# Patient Record
Sex: FEMALE | Race: WHITE | Employment: OTHER | URBAN - METROPOLITAN AREA
[De-identification: names, ages, dates, MRNs, and addresses within clinical notes are randomized per-mention and may not be internally consistent; named-entity substitution may affect disease eponyms.]

---

## 2017-06-21 ENCOUNTER — OFFICE VISIT (OUTPATIENT)
Dept: ORTHOPEDIC SURGERY | Age: 62
End: 2017-06-21
Payer: COMMERCIAL

## 2017-06-21 DIAGNOSIS — M25.561 CHRONIC PAIN OF RIGHT KNEE: Primary | ICD-10-CM

## 2017-06-21 DIAGNOSIS — G89.29 CHRONIC PAIN OF RIGHT KNEE: Primary | ICD-10-CM

## 2017-06-21 PROCEDURE — 99203 OFFICE O/P NEW LOW 30 MIN: CPT | Performed by: ORTHOPAEDIC SURGERY

## 2017-06-30 ENCOUNTER — OFFICE VISIT (OUTPATIENT)
Dept: BARIATRICS/WEIGHT MGMT | Age: 62
End: 2017-06-30
Payer: COMMERCIAL

## 2017-06-30 VITALS
DIASTOLIC BLOOD PRESSURE: 75 MMHG | BODY MASS INDEX: 46.44 KG/M2 | HEIGHT: 61 IN | SYSTOLIC BLOOD PRESSURE: 130 MMHG | WEIGHT: 246 LBS | HEART RATE: 75 BPM

## 2017-06-30 DIAGNOSIS — N39.3 SUI (STRESS URINARY INCONTINENCE, FEMALE): ICD-10-CM

## 2017-06-30 DIAGNOSIS — G47.33 OBSTRUCTIVE SLEEP APNEA SYNDROME: Chronic | ICD-10-CM

## 2017-06-30 DIAGNOSIS — I10 ESSENTIAL HYPERTENSION: Primary | Chronic | ICD-10-CM

## 2017-06-30 PROCEDURE — 99213 OFFICE O/P EST LOW 20 MIN: CPT | Performed by: SURGERY

## 2017-06-30 RX ORDER — MULTIVIT WITH MINERALS/LUTEIN
1000 TABLET ORAL DAILY
COMMUNITY

## 2017-06-30 RX ORDER — ACETAMINOPHEN 160 MG
TABLET,DISINTEGRATING ORAL DAILY
COMMUNITY

## 2017-06-30 RX ORDER — BISOPROLOL FUMARATE AND HYDROCHLOROTHIAZIDE 5; 6.25 MG/1; MG/1
1 TABLET ORAL DAILY
COMMUNITY
Start: 2016-11-22

## 2017-06-30 RX ORDER — MAGNESIUM 200 MG
250 TABLET ORAL DAILY
COMMUNITY
End: 2018-05-29 | Stop reason: DRUGHIGH

## 2017-06-30 RX ORDER — LOSARTAN POTASSIUM 50 MG/1
50 TABLET ORAL
COMMUNITY
Start: 2016-11-12 | End: 2017-06-30

## 2017-07-05 ENCOUNTER — OFFICE VISIT (OUTPATIENT)
Dept: OBGYN CLINIC | Age: 62
End: 2017-07-05
Payer: COMMERCIAL

## 2017-07-05 VITALS
DIASTOLIC BLOOD PRESSURE: 84 MMHG | HEART RATE: 78 BPM | WEIGHT: 246 LBS | SYSTOLIC BLOOD PRESSURE: 126 MMHG | BODY MASS INDEX: 46.44 KG/M2 | HEIGHT: 61 IN

## 2017-07-05 DIAGNOSIS — Z01.419 WELL WOMAN EXAM WITH ROUTINE GYNECOLOGICAL EXAM: Primary | ICD-10-CM

## 2017-07-05 DIAGNOSIS — Z13.820 SCREENING FOR OSTEOPOROSIS: ICD-10-CM

## 2017-07-05 DIAGNOSIS — Z12.39 ENCOUNTER FOR BREAST CANCER SCREENING OTHER THAN MAMMOGRAM: ICD-10-CM

## 2017-07-05 PROCEDURE — 99396 PREV VISIT EST AGE 40-64: CPT | Performed by: NURSE PRACTITIONER

## 2017-07-05 RX ORDER — METRONIDAZOLE 500 MG/1
500 TABLET ORAL 2 TIMES DAILY
Qty: 14 TABLET | Refills: 0 | Status: SHIPPED | OUTPATIENT
Start: 2017-07-05 | End: 2017-07-12

## 2017-07-05 ASSESSMENT — PATIENT HEALTH QUESTIONNAIRE - PHQ9
2. FEELING DOWN, DEPRESSED OR HOPELESS: 0
SUM OF ALL RESPONSES TO PHQ9 QUESTIONS 1 & 2: 0
SUM OF ALL RESPONSES TO PHQ QUESTIONS 1-9: 0
1. LITTLE INTEREST OR PLEASURE IN DOING THINGS: 0

## 2017-07-10 ENCOUNTER — TELEPHONE (OUTPATIENT)
Dept: BARIATRICS/WEIGHT MGMT | Age: 62
End: 2017-07-10

## 2017-07-14 ENCOUNTER — OFFICE VISIT (OUTPATIENT)
Dept: BARIATRICS/WEIGHT MGMT | Age: 62
End: 2017-07-14
Payer: COMMERCIAL

## 2017-07-14 VITALS
HEIGHT: 61 IN | RESPIRATION RATE: 20 BRPM | WEIGHT: 247 LBS | HEART RATE: 76 BPM | SYSTOLIC BLOOD PRESSURE: 102 MMHG | BODY MASS INDEX: 46.63 KG/M2 | DIASTOLIC BLOOD PRESSURE: 66 MMHG

## 2017-07-14 DIAGNOSIS — F32.A DEPRESSION, UNSPECIFIED DEPRESSION TYPE: ICD-10-CM

## 2017-07-14 DIAGNOSIS — G47.33 OBSTRUCTIVE SLEEP APNEA SYNDROME: Chronic | ICD-10-CM

## 2017-07-14 DIAGNOSIS — G89.29 CHRONIC PAIN OF RIGHT KNEE: ICD-10-CM

## 2017-07-14 DIAGNOSIS — E66.01 OBESITY, MORBID, BMI 40.0-49.9 (HCC): ICD-10-CM

## 2017-07-14 DIAGNOSIS — J45.909 UNCOMPLICATED ASTHMA, UNSPECIFIED ASTHMA SEVERITY: Chronic | ICD-10-CM

## 2017-07-14 DIAGNOSIS — Z90.3 S/P PARTIAL GASTRECTOMY: Chronic | ICD-10-CM

## 2017-07-14 DIAGNOSIS — I10 ESSENTIAL HYPERTENSION: Primary | Chronic | ICD-10-CM

## 2017-07-14 DIAGNOSIS — K21.9 GASTROESOPHAGEAL REFLUX DISEASE, ESOPHAGITIS PRESENCE NOT SPECIFIED: Chronic | ICD-10-CM

## 2017-07-14 DIAGNOSIS — E03.9 HYPOTHYROIDISM, UNSPECIFIED TYPE: Chronic | ICD-10-CM

## 2017-07-14 DIAGNOSIS — M85.80 OSTEOPENIA: Chronic | ICD-10-CM

## 2017-07-14 DIAGNOSIS — M25.561 CHRONIC PAIN OF RIGHT KNEE: ICD-10-CM

## 2017-07-14 DIAGNOSIS — N39.3 SUI (STRESS URINARY INCONTINENCE, FEMALE): ICD-10-CM

## 2017-07-14 PROCEDURE — 99213 OFFICE O/P EST LOW 20 MIN: CPT | Performed by: NURSE PRACTITIONER

## 2017-07-17 ENCOUNTER — HOSPITAL ENCOUNTER (OUTPATIENT)
Dept: WOMENS IMAGING | Age: 62
Discharge: HOME | End: 2017-07-17
Payer: COMMERCIAL

## 2017-07-17 DIAGNOSIS — Z01.419 WELL WOMAN EXAM WITH ROUTINE GYNECOLOGICAL EXAM: ICD-10-CM

## 2017-07-17 DIAGNOSIS — Z12.39 ENCOUNTER FOR BREAST CANCER SCREENING OTHER THAN MAMMOGRAM: ICD-10-CM

## 2017-07-17 PROCEDURE — 77063 BREAST TOMOSYNTHESIS BI: CPT

## 2017-07-18 ENCOUNTER — HOSPITAL ENCOUNTER (OUTPATIENT)
Age: 62
Setting detail: SPECIMEN
Discharge: HOME | End: 2017-07-18
Payer: COMMERCIAL

## 2017-07-18 DIAGNOSIS — J45.909 UNCOMPLICATED ASTHMA, UNSPECIFIED ASTHMA SEVERITY: Chronic | ICD-10-CM

## 2017-07-18 DIAGNOSIS — M85.80 OSTEOPENIA: Chronic | ICD-10-CM

## 2017-07-18 DIAGNOSIS — G89.29 CHRONIC PAIN OF RIGHT KNEE: ICD-10-CM

## 2017-07-18 DIAGNOSIS — N39.3 SUI (STRESS URINARY INCONTINENCE, FEMALE): ICD-10-CM

## 2017-07-18 DIAGNOSIS — E03.9 HYPOTHYROIDISM, UNSPECIFIED TYPE: Chronic | ICD-10-CM

## 2017-07-18 DIAGNOSIS — M25.561 CHRONIC PAIN OF RIGHT KNEE: ICD-10-CM

## 2017-07-18 DIAGNOSIS — I10 ESSENTIAL HYPERTENSION: Chronic | ICD-10-CM

## 2017-07-18 DIAGNOSIS — F32.A DEPRESSION, UNSPECIFIED DEPRESSION TYPE: ICD-10-CM

## 2017-07-18 DIAGNOSIS — K21.9 GASTROESOPHAGEAL REFLUX DISEASE, ESOPHAGITIS PRESENCE NOT SPECIFIED: Chronic | ICD-10-CM

## 2017-07-18 DIAGNOSIS — G47.33 OBSTRUCTIVE SLEEP APNEA SYNDROME: Chronic | ICD-10-CM

## 2017-07-18 DIAGNOSIS — Z90.3 S/P PARTIAL GASTRECTOMY: Chronic | ICD-10-CM

## 2017-07-18 LAB
ABSOLUTE EOS #: 0.2 K/UL (ref 0–0.4)
ABSOLUTE LYMPH #: 1.9 K/UL (ref 1–4.8)
ABSOLUTE MONO #: 0.5 K/UL (ref 0.1–1.2)
ALBUMIN SERPL-MCNC: 4.1 G/DL (ref 3.5–5.2)
ALBUMIN/GLOBULIN RATIO: 1.5 (ref 1–2.5)
ALP BLD-CCNC: 65 U/L (ref 35–104)
ALT SERPL-CCNC: 42 U/L (ref 5–33)
ANION GAP SERPL CALCULATED.3IONS-SCNC: 14 MMOL/L (ref 9–17)
AST SERPL-CCNC: 30 U/L
BASOPHILS # BLD: 0 %
BASOPHILS ABSOLUTE: 0 K/UL (ref 0–0.2)
BILIRUB SERPL-MCNC: 0.26 MG/DL (ref 0.3–1.2)
BUN BLDV-MCNC: 28 MG/DL (ref 8–23)
BUN/CREAT BLD: ABNORMAL (ref 9–20)
CALCIUM SERPL-MCNC: 9.9 MG/DL (ref 8.6–10.4)
CHLORIDE BLD-SCNC: 101 MMOL/L (ref 98–107)
CHOLESTEROL/HDL RATIO: 4.3
CHOLESTEROL: 192 MG/DL
CO2: 26 MMOL/L (ref 20–31)
CREAT SERPL-MCNC: 0.94 MG/DL (ref 0.5–0.9)
DIFFERENTIAL TYPE: NORMAL
EOSINOPHILS RELATIVE PERCENT: 4 %
ESTIMATED AVERAGE GLUCOSE: 128 MG/DL
FERRITIN: 67 UG/L (ref 13–150)
FOLATE: >20 NG/ML
GFR AFRICAN AMERICAN: >60 ML/MIN
GFR NON-AFRICAN AMERICAN: >60 ML/MIN
GFR SERPL CREATININE-BSD FRML MDRD: ABNORMAL ML/MIN/{1.73_M2}
GFR SERPL CREATININE-BSD FRML MDRD: ABNORMAL ML/MIN/{1.73_M2}
GLUCOSE BLD-MCNC: 109 MG/DL (ref 70–99)
HBA1C MFR BLD: 6.1 % (ref 4–6)
HCT VFR BLD CALC: 42.1 % (ref 36–46)
HDLC SERPL-MCNC: 45 MG/DL
HEMOGLOBIN: 13.9 G/DL (ref 12–16)
IRON SATURATION: 32 % (ref 20–55)
IRON: 106 UG/DL (ref 37–145)
LDL CHOLESTEROL: 104 MG/DL (ref 0–130)
LYMPHOCYTES # BLD: 28 %
MAGNESIUM: 2.4 MG/DL (ref 1.6–2.6)
MCH RBC QN AUTO: 30.8 PG (ref 26–34)
MCHC RBC AUTO-ENTMCNC: 33 G/DL (ref 31–37)
MCV RBC AUTO: 93.3 FL (ref 80–100)
MONOCYTES # BLD: 8 %
PDW BLD-RTO: 14.9 % (ref 12.5–15.4)
PLATELET # BLD: 243 K/UL (ref 140–450)
PLATELET ESTIMATE: NORMAL
PMV BLD AUTO: 8.2 FL (ref 6–12)
POTASSIUM SERPL-SCNC: 4.6 MMOL/L (ref 3.7–5.3)
PTH INTACT: 25.22 PG/ML (ref 15–65)
RBC # BLD: 4.51 M/UL (ref 4–5.2)
RBC # BLD: NORMAL 10*6/UL
SEG NEUTROPHILS: 60 %
SEGMENTED NEUTROPHILS ABSOLUTE COUNT: 4.1 K/UL (ref 1.8–7.7)
SODIUM BLD-SCNC: 141 MMOL/L (ref 135–144)
THYROXINE, FREE: 1.31 NG/DL (ref 0.93–1.7)
TOTAL IRON BINDING CAPACITY: 330 UG/DL (ref 250–450)
TOTAL PROTEIN: 6.9 G/DL (ref 6.4–8.3)
TRIGL SERPL-MCNC: 217 MG/DL
TSH SERPL DL<=0.05 MIU/L-ACNC: 0.62 MIU/L (ref 0.3–5)
UNSATURATED IRON BINDING CAPACITY: 224 UG/DL (ref 112–347)
VITAMIN B-12: 978 PG/ML (ref 211–946)
VITAMIN D 25-HYDROXY: 75.9 NG/ML (ref 30–100)
VLDLC SERPL CALC-MCNC: ABNORMAL MG/DL (ref 1–30)
WBC # BLD: 6.7 K/UL (ref 3.5–11)
WBC # BLD: NORMAL 10*3/UL

## 2017-07-19 PROBLEM — R73.03 PREDIABETES: Status: ACTIVE | Noted: 2017-07-19

## 2017-07-20 ENCOUNTER — NURSE ONLY (OUTPATIENT)
Dept: BARIATRICS/WEIGHT MGMT | Age: 62
End: 2017-07-20

## 2017-07-20 VITALS — BODY MASS INDEX: 45.91 KG/M2 | WEIGHT: 243 LBS

## 2017-07-20 LAB
RETINYL PALMITATE: 0.04 MG/L (ref 0–0.1)
VITAMIN A LEVEL: 0.62 MG/L (ref 0.3–1.2)
VITAMIN A, INTERP: NORMAL
VITAMIN B1 WHOLE BLOOD: 67 NMOL/L (ref 70–180)
ZINC: 98 UG/DL (ref 60–120)

## 2017-08-16 ENCOUNTER — HOSPITAL ENCOUNTER (OUTPATIENT)
Dept: PREADMISSION TESTING | Age: 62
Discharge: HOME | End: 2017-08-16
Payer: COMMERCIAL

## 2017-08-16 VITALS
DIASTOLIC BLOOD PRESSURE: 83 MMHG | HEIGHT: 61 IN | TEMPERATURE: 97.9 F | RESPIRATION RATE: 20 BRPM | BODY MASS INDEX: 45.31 KG/M2 | OXYGEN SATURATION: 96 % | HEART RATE: 78 BPM | WEIGHT: 240 LBS | SYSTOLIC BLOOD PRESSURE: 116 MMHG

## 2017-08-16 LAB
ABSOLUTE EOS #: 0.2 K/UL (ref 0–0.4)
ABSOLUTE LYMPH #: 2.4 K/UL (ref 1–4.8)
ABSOLUTE MONO #: 0.5 K/UL (ref 0.1–1.3)
ANION GAP SERPL CALCULATED.3IONS-SCNC: 13 MMOL/L (ref 9–17)
BASOPHILS # BLD: 0 %
BASOPHILS ABSOLUTE: 0 K/UL (ref 0–0.2)
BUN BLDV-MCNC: 18 MG/DL (ref 8–23)
BUN/CREAT BLD: ABNORMAL (ref 9–20)
CALCIUM SERPL-MCNC: 9.5 MG/DL (ref 8.6–10.4)
CHLORIDE BLD-SCNC: 102 MMOL/L (ref 98–107)
CO2: 25 MMOL/L (ref 20–31)
CREAT SERPL-MCNC: 0.68 MG/DL (ref 0.5–0.9)
DIFFERENTIAL TYPE: NORMAL
EOSINOPHILS RELATIVE PERCENT: 3 %
GFR AFRICAN AMERICAN: >60 ML/MIN
GFR NON-AFRICAN AMERICAN: >60 ML/MIN
GFR SERPL CREATININE-BSD FRML MDRD: ABNORMAL ML/MIN/{1.73_M2}
GFR SERPL CREATININE-BSD FRML MDRD: ABNORMAL ML/MIN/{1.73_M2}
GLUCOSE BLD-MCNC: 101 MG/DL (ref 70–99)
HCT VFR BLD CALC: 45 % (ref 36–46)
HEMOGLOBIN: 15 G/DL (ref 12–16)
LYMPHOCYTES # BLD: 29 %
MCH RBC QN AUTO: 31.6 PG (ref 26–34)
MCHC RBC AUTO-ENTMCNC: 33.4 G/DL (ref 31–37)
MCV RBC AUTO: 94.7 FL (ref 80–100)
MONOCYTES # BLD: 6 %
PDW BLD-RTO: 14.2 % (ref 11.5–14.9)
PLATELET # BLD: 251 K/UL (ref 150–450)
PLATELET ESTIMATE: NORMAL
PMV BLD AUTO: 8.1 FL (ref 6–12)
POTASSIUM SERPL-SCNC: 4.4 MMOL/L (ref 3.7–5.3)
RBC # BLD: 4.75 M/UL (ref 4–5.2)
RBC # BLD: NORMAL 10*6/UL
SEG NEUTROPHILS: 62 %
SEGMENTED NEUTROPHILS ABSOLUTE COUNT: 5 K/UL (ref 1.3–9.1)
SODIUM BLD-SCNC: 140 MMOL/L (ref 135–144)
WBC # BLD: 8.1 K/UL (ref 3.5–11)
WBC # BLD: NORMAL 10*3/UL

## 2017-08-16 PROCEDURE — 85025 COMPLETE CBC W/AUTO DIFF WBC: CPT

## 2017-08-16 PROCEDURE — 36415 COLL VENOUS BLD VENIPUNCTURE: CPT

## 2017-08-16 PROCEDURE — 93005 ELECTROCARDIOGRAM TRACING: CPT

## 2017-08-16 PROCEDURE — 80048 BASIC METABOLIC PNL TOTAL CA: CPT

## 2017-08-16 RX ORDER — B-COMPLEX WITH VITAMIN C
250 TABLET ORAL DAILY
COMMUNITY

## 2017-08-17 RX ORDER — LIDOCAINE HYDROCHLORIDE 10 MG/ML
1 INJECTION, SOLUTION EPIDURAL; INFILTRATION; INTRACAUDAL; PERINEURAL
Status: CANCELLED | OUTPATIENT
Start: 2017-08-17 | End: 2017-08-17

## 2017-08-17 RX ORDER — SODIUM CHLORIDE, SODIUM LACTATE, POTASSIUM CHLORIDE, CALCIUM CHLORIDE 600; 310; 30; 20 MG/100ML; MG/100ML; MG/100ML; MG/100ML
INJECTION, SOLUTION INTRAVENOUS CONTINUOUS
Status: CANCELLED | OUTPATIENT
Start: 2017-08-17

## 2017-08-17 RX ORDER — SODIUM CHLORIDE 0.9 % (FLUSH) 0.9 %
10 SYRINGE (ML) INJECTION EVERY 12 HOURS SCHEDULED
Status: CANCELLED | OUTPATIENT
Start: 2017-08-17

## 2017-08-17 RX ORDER — SODIUM CHLORIDE 0.9 % (FLUSH) 0.9 %
10 SYRINGE (ML) INJECTION PRN
Status: CANCELLED | OUTPATIENT
Start: 2017-08-17

## 2017-08-19 ENCOUNTER — HOSPITAL ENCOUNTER (EMERGENCY)
Age: 62
Discharge: HOME | End: 2017-08-19
Attending: EMERGENCY MEDICINE
Payer: COMMERCIAL

## 2017-08-19 ENCOUNTER — APPOINTMENT (OUTPATIENT)
Dept: GENERAL RADIOLOGY | Age: 62
End: 2017-08-19
Payer: COMMERCIAL

## 2017-08-19 VITALS
TEMPERATURE: 98.2 F | BODY MASS INDEX: 45.31 KG/M2 | HEART RATE: 77 BPM | OXYGEN SATURATION: 95 % | WEIGHT: 240 LBS | RESPIRATION RATE: 18 BRPM | SYSTOLIC BLOOD PRESSURE: 130 MMHG | DIASTOLIC BLOOD PRESSURE: 74 MMHG | HEIGHT: 61 IN

## 2017-08-19 DIAGNOSIS — S42.402A ELBOW FRACTURE, LEFT, CLOSED, INITIAL ENCOUNTER: Primary | ICD-10-CM

## 2017-08-19 PROCEDURE — 29105 APPLICATION LONG ARM SPLINT: CPT

## 2017-08-19 PROCEDURE — 96374 THER/PROPH/DIAG INJ IV PUSH: CPT

## 2017-08-19 PROCEDURE — 6360000002 HC RX W HCPCS: Performed by: EMERGENCY MEDICINE

## 2017-08-19 PROCEDURE — 73080 X-RAY EXAM OF ELBOW: CPT

## 2017-08-19 PROCEDURE — 99283 EMERGENCY DEPT VISIT LOW MDM: CPT

## 2017-08-19 PROCEDURE — 96375 TX/PRO/DX INJ NEW DRUG ADDON: CPT

## 2017-08-19 RX ORDER — KETOROLAC TROMETHAMINE 10 MG/1
10 TABLET, FILM COATED ORAL EVERY 6 HOURS PRN
Qty: 20 TABLET | Refills: 0 | Status: SHIPPED | OUTPATIENT
Start: 2017-08-19 | End: 2017-08-30 | Stop reason: ALTCHOICE

## 2017-08-19 RX ORDER — KETOROLAC TROMETHAMINE 30 MG/ML
30 INJECTION, SOLUTION INTRAMUSCULAR; INTRAVENOUS ONCE
Status: COMPLETED | OUTPATIENT
Start: 2017-08-19 | End: 2017-08-19

## 2017-08-19 RX ORDER — MORPHINE SULFATE 4 MG/ML
4 INJECTION, SOLUTION INTRAMUSCULAR; INTRAVENOUS ONCE
Status: COMPLETED | OUTPATIENT
Start: 2017-08-19 | End: 2017-08-19

## 2017-08-19 RX ADMIN — KETOROLAC TROMETHAMINE 30 MG: 30 INJECTION, SOLUTION INTRAMUSCULAR; INTRAVENOUS at 19:52

## 2017-08-19 RX ADMIN — MORPHINE SULFATE 4 MG: 4 INJECTION, SOLUTION INTRAMUSCULAR; INTRAVENOUS at 18:22

## 2017-08-19 ASSESSMENT — ENCOUNTER SYMPTOMS
CONSTIPATION: 0
VOMITING: 0
COUGH: 0
DIARRHEA: 0
BACK PAIN: 0
SHORTNESS OF BREATH: 0
NAUSEA: 0
ABDOMINAL PAIN: 0

## 2017-08-19 ASSESSMENT — PAIN DESCRIPTION - ORIENTATION: ORIENTATION: LEFT

## 2017-08-19 ASSESSMENT — PAIN SCALES - GENERAL
PAINLEVEL_OUTOF10: 7

## 2017-08-19 ASSESSMENT — PAIN DESCRIPTION - FREQUENCY: FREQUENCY: CONTINUOUS

## 2017-08-19 ASSESSMENT — PAIN DESCRIPTION - DESCRIPTORS: DESCRIPTORS: ACHING

## 2017-08-19 ASSESSMENT — PAIN DESCRIPTION - LOCATION: LOCATION: ELBOW

## 2017-08-23 LAB
EKG ATRIAL RATE: 69 BPM
EKG P AXIS: 52 DEGREES
EKG P-R INTERVAL: 152 MS
EKG Q-T INTERVAL: 410 MS
EKG QRS DURATION: 84 MS
EKG QTC CALCULATION (BAZETT): 439 MS
EKG R AXIS: -3 DEGREES
EKG T AXIS: 38 DEGREES
EKG VENTRICULAR RATE: 69 BPM

## 2017-08-28 ENCOUNTER — OFFICE VISIT (OUTPATIENT)
Dept: ORTHOPEDIC SURGERY | Age: 62
End: 2017-08-28
Payer: COMMERCIAL

## 2017-08-28 DIAGNOSIS — S42.402D ELBOW FRACTURE, LEFT, WITH ROUTINE HEALING, SUBSEQUENT ENCOUNTER: Primary | ICD-10-CM

## 2017-08-28 PROCEDURE — 99202 OFFICE O/P NEW SF 15 MIN: CPT | Performed by: ORTHOPAEDIC SURGERY

## 2017-08-28 RX ORDER — HYDROCODONE BITARTRATE AND ACETAMINOPHEN 5; 325 MG/1; MG/1
1-2 TABLET ORAL EVERY 6 HOURS PRN
Qty: 40 TABLET | Refills: 0 | Status: SHIPPED | OUTPATIENT
Start: 2017-08-28 | End: 2017-11-15 | Stop reason: ALTCHOICE

## 2017-08-28 RX ORDER — TRAMADOL HYDROCHLORIDE 50 MG/1
50 TABLET ORAL EVERY 6 HOURS PRN
COMMUNITY
End: 2018-06-05 | Stop reason: CLARIF

## 2017-08-30 ENCOUNTER — OFFICE VISIT (OUTPATIENT)
Dept: BARIATRICS/WEIGHT MGMT | Age: 62
End: 2017-08-30
Payer: COMMERCIAL

## 2017-08-30 VITALS
DIASTOLIC BLOOD PRESSURE: 72 MMHG | BODY MASS INDEX: 45.12 KG/M2 | HEART RATE: 74 BPM | SYSTOLIC BLOOD PRESSURE: 126 MMHG | WEIGHT: 239 LBS | HEIGHT: 61 IN

## 2017-08-30 DIAGNOSIS — K21.9 GASTROESOPHAGEAL REFLUX DISEASE, ESOPHAGITIS PRESENCE NOT SPECIFIED: Chronic | ICD-10-CM

## 2017-08-30 DIAGNOSIS — M85.80 OSTEOPENIA, UNSPECIFIED LOCATION: Chronic | ICD-10-CM

## 2017-08-30 DIAGNOSIS — E66.01 OBESITY, MORBID, BMI 40.0-49.9 (HCC): ICD-10-CM

## 2017-08-30 DIAGNOSIS — Z90.3 S/P PARTIAL GASTRECTOMY: Chronic | ICD-10-CM

## 2017-08-30 DIAGNOSIS — I10 ESSENTIAL HYPERTENSION: Primary | Chronic | ICD-10-CM

## 2017-08-30 DIAGNOSIS — N39.3 SUI (STRESS URINARY INCONTINENCE, FEMALE): ICD-10-CM

## 2017-08-30 DIAGNOSIS — F32.A DEPRESSION, UNSPECIFIED DEPRESSION TYPE: ICD-10-CM

## 2017-08-30 DIAGNOSIS — R73.03 PREDIABETES: ICD-10-CM

## 2017-08-30 DIAGNOSIS — E03.9 HYPOTHYROIDISM, UNSPECIFIED TYPE: Chronic | ICD-10-CM

## 2017-08-30 DIAGNOSIS — J45.909 UNCOMPLICATED ASTHMA, UNSPECIFIED ASTHMA SEVERITY: Chronic | ICD-10-CM

## 2017-08-30 DIAGNOSIS — G89.29 CHRONIC PAIN OF RIGHT KNEE: ICD-10-CM

## 2017-08-30 DIAGNOSIS — G47.33 OBSTRUCTIVE SLEEP APNEA SYNDROME: Chronic | ICD-10-CM

## 2017-08-30 DIAGNOSIS — M25.561 CHRONIC PAIN OF RIGHT KNEE: ICD-10-CM

## 2017-08-30 PROCEDURE — 99213 OFFICE O/P EST LOW 20 MIN: CPT | Performed by: NURSE PRACTITIONER

## 2017-08-31 ENCOUNTER — HOSPITAL ENCOUNTER (OUTPATIENT)
Dept: CT IMAGING | Age: 62
Discharge: HOME | End: 2017-08-31
Payer: COMMERCIAL

## 2017-08-31 ENCOUNTER — TELEPHONE (OUTPATIENT)
Dept: ORTHOPEDIC SURGERY | Age: 62
End: 2017-08-31

## 2017-08-31 DIAGNOSIS — S42.402D ELBOW FRACTURE, LEFT, WITH ROUTINE HEALING, SUBSEQUENT ENCOUNTER: ICD-10-CM

## 2017-08-31 PROCEDURE — 73200 CT UPPER EXTREMITY W/O DYE: CPT

## 2017-09-11 ENCOUNTER — OFFICE VISIT (OUTPATIENT)
Dept: ORTHOPEDIC SURGERY | Age: 62
End: 2017-09-11

## 2017-09-11 DIAGNOSIS — S42.402D ELBOW FRACTURE, LEFT, WITH ROUTINE HEALING, SUBSEQUENT ENCOUNTER: Primary | ICD-10-CM

## 2017-09-11 PROCEDURE — 99024 POSTOP FOLLOW-UP VISIT: CPT | Performed by: ORTHOPAEDIC SURGERY

## 2017-09-26 ENCOUNTER — OFFICE VISIT (OUTPATIENT)
Dept: BARIATRICS/WEIGHT MGMT | Age: 62
End: 2017-09-26
Payer: COMMERCIAL

## 2017-09-26 VITALS
SYSTOLIC BLOOD PRESSURE: 118 MMHG | HEIGHT: 61 IN | RESPIRATION RATE: 20 BRPM | HEART RATE: 74 BPM | WEIGHT: 238 LBS | BODY MASS INDEX: 44.93 KG/M2 | DIASTOLIC BLOOD PRESSURE: 74 MMHG

## 2017-09-26 DIAGNOSIS — J45.909 UNCOMPLICATED ASTHMA, UNSPECIFIED ASTHMA SEVERITY: Chronic | ICD-10-CM

## 2017-09-26 DIAGNOSIS — E03.9 HYPOTHYROIDISM, UNSPECIFIED TYPE: Chronic | ICD-10-CM

## 2017-09-26 DIAGNOSIS — M85.80 OSTEOPENIA, UNSPECIFIED LOCATION: Chronic | ICD-10-CM

## 2017-09-26 DIAGNOSIS — Z90.3 S/P PARTIAL GASTRECTOMY: Chronic | ICD-10-CM

## 2017-09-26 DIAGNOSIS — I10 ESSENTIAL HYPERTENSION: Primary | Chronic | ICD-10-CM

## 2017-09-26 DIAGNOSIS — R73.03 PREDIABETES: ICD-10-CM

## 2017-09-26 DIAGNOSIS — F32.A DEPRESSION, UNSPECIFIED DEPRESSION TYPE: ICD-10-CM

## 2017-09-26 DIAGNOSIS — G89.29 CHRONIC PAIN OF RIGHT KNEE: ICD-10-CM

## 2017-09-26 DIAGNOSIS — E66.01 OBESITY, MORBID, BMI 40.0-49.9 (HCC): ICD-10-CM

## 2017-09-26 DIAGNOSIS — M25.561 CHRONIC PAIN OF RIGHT KNEE: ICD-10-CM

## 2017-09-26 DIAGNOSIS — G47.33 OBSTRUCTIVE SLEEP APNEA SYNDROME: Chronic | ICD-10-CM

## 2017-09-26 DIAGNOSIS — K21.9 GASTROESOPHAGEAL REFLUX DISEASE, ESOPHAGITIS PRESENCE NOT SPECIFIED: Chronic | ICD-10-CM

## 2017-09-26 PROCEDURE — 99213 OFFICE O/P EST LOW 20 MIN: CPT | Performed by: NURSE PRACTITIONER

## 2017-10-04 ENCOUNTER — OFFICE VISIT (OUTPATIENT)
Dept: ORTHOPEDIC SURGERY | Age: 62
End: 2017-10-04

## 2017-10-04 DIAGNOSIS — S42.402D ELBOW FRACTURE, LEFT, WITH ROUTINE HEALING, SUBSEQUENT ENCOUNTER: Primary | ICD-10-CM

## 2017-10-04 PROCEDURE — 99024 POSTOP FOLLOW-UP VISIT: CPT | Performed by: ORTHOPAEDIC SURGERY

## 2017-10-04 RX ORDER — HYDROCODONE BITARTRATE AND ACETAMINOPHEN 5; 325 MG/1; MG/1
1 TABLET ORAL EVERY 6 HOURS PRN
Qty: 20 TABLET | Refills: 0 | Status: SHIPPED | OUTPATIENT
Start: 2017-10-04 | End: 2017-10-11

## 2017-10-04 NOTE — PROGRESS NOTES
Jareth Pizarro M.D.            [address] [place] [address]., [address] [facility], [address] [facility]             Dept Phone: [phone]             Dept Fax:  [phone] returns today. She is status post nondisplaced left proximal fracture as well as nondisplaced do minimally displaced radial head fracture. Unusual fracture pattern. She had a CT scan to verify this. Patient states that that she has low bit of aching is now than but she is making great progress in therapy and pretty much getting back to resume a normal daily activity. Examination patient's left shoulder notes that she lacks about the last 10° of full extension. She can flex almost to touch her shoulder. Supination pronation are essentially full. She has good strength. Overall doing very well. No neurovascular change    X-rays taken today and reviewed by me show AP lateral views of the left elbow which shows a nondisplaced left proximal ulnar fracture and of in good position. Her radial head fracture had very minimal displacement very acceptable doing well. Impression line status post left proximal radius and ulnar fracture    Plan line patient overall doing well. I told her she's not likely to get full extension of her elbow but this is not necessary. She's enough to do daily activities. She has a come more therapy sessions. She requests another Norco prescription for a few nights ×20. We'll see her back here when necessary        Review of Systems   Constitutional: Negative. HENT: Negative. Respiratory: Negative. Cardiovascular: Negative. Musculoskeletal: Negative. Neurological: Negative.          Past Medical History:   Diagnosis Date    Asthma     Depression     GERD (gastroesophageal reflux disease)     Glaucoma associated with anomalies of iris     Hyperlipidemia     hx of, currently no medication    Hypertension     Hypothyroidism     Left elbow fracture     Obesity     Open abdominal wall wound 10/7/2015    Tendonitis     Unspecified sleep apnea     CPAP    Urinary incontinence     Vision abnormalities     glasses     Past Surgical History:   Procedure Laterality Date    ABDOMINOPLASTY  1995    ABSCESS DRAINAGE  9-16-15    abd. wall abscess    BUNIONECTOMY Bilateral 2003    CATARACT REMOVAL Bilateral 2007    CHOLECYSTECTOMY  2003    COLONOSCOPY  2012    negative, repeat in 10 years    ENDOSCOPY, COLON, DIAGNOSTIC      EYE SURGERY      GLAUCOMA SURGERY      KNEE CARTILAGE SURGERY  2009    ROTATOR CUFF REPAIR Right 1998    STOMACH SURGERY  1989    stomach stapling, [facility]    TONSILLECTOMY      1 y.o.    WISDOM TOOTH EXTRACTION       Family History   Problem Relation Age of Onset    Diabetes Maternal Grandmother    [facility] COPD Mother     Rheum Arthritis Mother     Osteoarthritis Mother     Osteoporosis Mother     Other Mother      AAA    High Blood Pressure Mother     Glaucoma Father     Arthritis Father     Cancer Father      throat cancer twice            Orders Placed This Encounter   Procedures    XR ELBOW LEFT (2 VIEWS)     Standing Status:   Future     Number of Occurrences:   1     Standing Expiration Date:   10/4/2018       1. Elbow fracture, left, with routine healing, subsequent encounter        Jerson Tariq MD    Please note that this chart was generated using voice recognition Dragon dictation software. Although every effort was made to ensure the accuracy of this automated transcription, some errors in transcription may have occurred.

## 2017-10-11 ENCOUNTER — NURSE ONLY (OUTPATIENT)
Dept: BARIATRICS/WEIGHT MGMT | Age: 62
End: 2017-10-11

## 2017-10-25 ENCOUNTER — OFFICE VISIT (OUTPATIENT)
Dept: BARIATRICS/WEIGHT MGMT | Age: 62
End: 2017-10-25
Payer: COMMERCIAL

## 2017-10-25 ENCOUNTER — NURSE ONLY (OUTPATIENT)
Dept: BARIATRICS/WEIGHT MGMT | Age: 62
End: 2017-10-25

## 2017-10-25 VITALS
DIASTOLIC BLOOD PRESSURE: 76 MMHG | SYSTOLIC BLOOD PRESSURE: 126 MMHG | WEIGHT: 235 LBS | RESPIRATION RATE: 20 BRPM | BODY MASS INDEX: 44.37 KG/M2 | HEIGHT: 61 IN | HEART RATE: 76 BPM

## 2017-10-25 DIAGNOSIS — R73.03 PREDIABETES: ICD-10-CM

## 2017-10-25 DIAGNOSIS — F32.A DEPRESSION, UNSPECIFIED DEPRESSION TYPE: ICD-10-CM

## 2017-10-25 DIAGNOSIS — M25.561 CHRONIC PAIN OF RIGHT KNEE: ICD-10-CM

## 2017-10-25 DIAGNOSIS — E03.9 HYPOTHYROIDISM, UNSPECIFIED TYPE: Chronic | ICD-10-CM

## 2017-10-25 DIAGNOSIS — J45.909 UNCOMPLICATED ASTHMA, UNSPECIFIED ASTHMA SEVERITY, UNSPECIFIED WHETHER PERSISTENT: Chronic | ICD-10-CM

## 2017-10-25 DIAGNOSIS — E66.01 OBESITY, MORBID, BMI 40.0-49.9 (HCC): ICD-10-CM

## 2017-10-25 DIAGNOSIS — G47.33 OBSTRUCTIVE SLEEP APNEA SYNDROME: Chronic | ICD-10-CM

## 2017-10-25 DIAGNOSIS — G89.29 CHRONIC PAIN OF RIGHT KNEE: ICD-10-CM

## 2017-10-25 DIAGNOSIS — K21.9 GASTROESOPHAGEAL REFLUX DISEASE, ESOPHAGITIS PRESENCE NOT SPECIFIED: Chronic | ICD-10-CM

## 2017-10-25 DIAGNOSIS — I10 ESSENTIAL HYPERTENSION: Primary | Chronic | ICD-10-CM

## 2017-10-25 PROCEDURE — 99213 OFFICE O/P EST LOW 20 MIN: CPT | Performed by: NURSE PRACTITIONER

## 2017-10-25 NOTE — PROGRESS NOTES
Medical Weight Management Progress Note    Subjective      Patient being seen for medically supervised weight loss for the chronic conditions of HTN, PEPE, GERD, Hypothyroidism, Asthma, Depression, Osteopenia, Urinary Incontinence, Chronic Pain Right Knee. She is s/p VBG done Dr. Alessandra Rubio at [facility] in [facility]. She initially lost 110 lbs and maintained her weight loss, but gained by approx 60 lbs after menopause. She was struggling with emotional/stress eating and was turning to ice cream for comfort  She is interested in revision surgery and was seen on consult by Dr. Kristen Allan 3 years ago and then again on 6/30/17. She is interested in pursuing  revision surgery. Her PCP prescribed Vyvanse for weight loss in the past and had good results, but then regained weight. She is working on the behavior changes discussed at the initial appointment. Patient continues on diet plan. Physical activity includes 7000-10,000 steps daily and arm/leg exercises. Weight loss of 3 lbs since last visit. Psych eval completed and working on recommendations. Using CPAP. No current issues. Working toward bariatric surgery:    [x] Revision Surgery                                                                Allergies: Allergies   Allergen Reactions    Nickel      In foods    Percocet [Oxycodone-Acetaminophen]        Past Medical History:     Past Medical History:   Diagnosis Date    Asthma     Depression     GERD (gastroesophageal reflux disease)     Glaucoma associated with anomalies of iris     Hyperlipidemia     hx of, currently no medication    Hypertension     Hypothyroidism     Left elbow fracture     Obesity     Open abdominal wall wound 10/7/2015    Tendonitis     Unspecified sleep apnea     CPAP    Urinary incontinence     Vision abnormalities     glasses   .     Past Surgical History:  Past Surgical History:   Procedure Laterality Date    ABDOMINOPLASTY  1995    ABSCESS DRAINAGE  9-16-15 Take by mouth every morning .  bisoprolol-hydrochlorothiazide (ZIAC) 5-6.25 MG per tablet Take 1 tablet by mouth      Potassium 99 MG TABS Take by mouth      Biotin 5000 MCG CAPS Take by mouth      Ascorbic Acid (VITAMIN C) 1000 MG tablet Take 1,000 mg by mouth daily      Cholecalciferol (VITAMIN D3) 2000 units CAPS Take by mouth 2 times daily      magnesium 200 MG TABS tablet Take 250 mg by mouth daily      alendronate (FOSAMAX) 70 MG tablet Take 1 tablet by mouth every 7 days 4 tablet 3    VENTOLIN  (90 BASE) MCG/ACT inhaler       docusate sodium (COLACE) 100 MG capsule Take 1 capsule by mouth daily as needed for Constipation 20 capsule 0    SYMBICORT 160-4.5 MCG/ACT AERO Inhale 2 puffs into the lungs 2 times daily       SYNTHROID 50 MCG tablet       omeprazole (PRILOSEC) 20 MG capsule Take 20 mg by mouth 2 times daily       CALCIUM CITRATE, BARIATRIC ADVANTAGE, 500MG LOZENGE Take 1 lozenge by mouth daily.  Multiple Vitamins-Minerals (OCUVITE PO) Take  by mouth.  FLUoxetine (PROZAC) 20 MG capsule Take 20 mg by mouth daily.  meloxicam (MOBIC) 15 MG tablet Take 15 mg by mouth daily.  losartan (COZAAR) 50 MG tablet Take 50 mg by mouth 2 times daily.  hydrOXYzine (ATARAX) 25 MG tablet Take 25 mg by mouth daily. No current facility-administered medications for this visit. Vital Signs:  /76 (Site: Right Arm, Position: Sitting, Cuff Size: Large Adult)   Pulse 76   Resp 20   Ht 5' 1\" (1.549 m)   Wt 235 lb (106.6 kg)   LMP 07/01/2010 (Approximate)   BMI 44.40 kg/m²     BMI/Height/Weight:  Body mass index is 44.4 kg/m². Review of Systems - A review of systems was performed. All was negative unless otherwise documented in HPI. Constitutional: Negative for fever, chills and diaphoresis. HENT: Negative for hearing loss and trouble swallowing. Eyes: Negative for photophobia and visual disturbance.    Respiratory: Negative for cough, shortness of breath and wheezing. Cardiovascular: Negative for chest pain and palpitations. Gastrointestinal: Negative for nausea, vomiting, abdominal pain, diarrhea, constipation, blood in stool and abdominal distention. Endocrine: Negative for polydipsia, polyphagia and polyuria. Genitourinary: Negative for dysuria, frequency, hematuria and difficulty urinating. Musculoskeletal: Negative for myalgias, joint swelling. Skin: Negative for pallor and rash. Neurological: Negative for dizziness, tremors, light-headedness and headaches. Psychiatric/Behavioral: Negative for sleep disturbance and dysphoric mood. Objective:      Physical Exam   Vital signs reviewed. General: Well-developed and well-nourished. No acute distress. Skin: Warm, dry and intact. HEENT: Normocephalic. EOMs intact. Conjunctivae normal. Neck supple. Cardiovascular: Normal rate, regular rhythm. No murmur. Pulmonary/Chest: Normal effort. Lungs clear to auscultation. No rales, rhonchi or wheezing. Abdominal: Positive bowel sounds. Soft, nontender. Nondistended. No rigidity, rebound, or guarding. Musculoskeletal: Movement x4. No edema. Neurological: Gait normal. Alert and oriented to person, place, and time. Psychiatric: Normal mood and affect. Speech and behavior normal. Judgment and thought content normal. Cognition and memory intact. Assessment:      1. Essential hypertension     2. Hypothyroidism, unspecified type     3. Uncomplicated asthma, unspecified asthma severity, unspecified whether persistent     4. Obstructive sleep apnea syndrome     5. Depression, unspecified depression type     6. Prediabetes     7. Chronic pain of right knee     8. Gastroesophageal reflux disease, esophagitis presence not specified     9. Obesity, morbid, BMI 40.0-49.9 ([facility]         Plan:    Dietitian visit today. Patient was encouraged to journal all food intake. Keep calorie level at approximately 7255-3389.  Protein intake is to be a minimum of 60-80 grams per day. Water drinking was encouraged with a goal of 64oz-128oz daily. Beverages to be calorie free except for milk. Every other beverage should be water. Avoid soda. Continue to increase level of physical activity. Encouraged use of exercise log. Follow-up  Return in about 1 month (around 11/25/2017). Orders this encounter:  No orders of the defined types were placed in this encounter. Prescriptions this encounter:  No orders of the defined types were placed in this encounter.       Electronically signed by:  Fawad Berry CNP

## 2017-10-26 NOTE — PROGRESS NOTES
Pt previously met bariatric behaviors/goasl. CHekced in for questions or concerns. Pt denied any need. Pt continues to do well with previously set behaviors.

## 2017-11-03 ENCOUNTER — TELEPHONE (OUTPATIENT)
Dept: BARIATRICS/WEIGHT MGMT | Age: 62
End: 2017-11-03

## 2017-11-03 NOTE — TELEPHONE ENCOUNTER
Dina Been called to get a status on when she will be submitted to insurance for  Authorization. She would like to get the surgery done before the end of the year. I did not see her on the patient spreadsheet and at first glance it looks like her  Checklist is complete. I advised she should expect a call back by Tuesday.

## 2017-11-08 NOTE — TELEPHONE ENCOUNTER
Spoke with Josue Grace,    Dr. Zion Bates asked to see this patient in the office before submitting to insurance she is scheduled to come in and see Dr. Zion Bates

## 2017-11-15 ENCOUNTER — OFFICE VISIT (OUTPATIENT)
Dept: BARIATRICS/WEIGHT MGMT | Age: 62
End: 2017-11-15
Payer: COMMERCIAL

## 2017-11-15 VITALS
BODY MASS INDEX: 44.37 KG/M2 | HEART RATE: 74 BPM | DIASTOLIC BLOOD PRESSURE: 74 MMHG | RESPIRATION RATE: 20 BRPM | SYSTOLIC BLOOD PRESSURE: 122 MMHG | WEIGHT: 235 LBS | HEIGHT: 61 IN

## 2017-11-15 DIAGNOSIS — G89.29 CHRONIC PAIN OF RIGHT KNEE: ICD-10-CM

## 2017-11-15 DIAGNOSIS — G47.33 OBSTRUCTIVE SLEEP APNEA SYNDROME: Chronic | ICD-10-CM

## 2017-11-15 DIAGNOSIS — R73.03 PREDIABETES: Primary | ICD-10-CM

## 2017-11-15 DIAGNOSIS — I10 ESSENTIAL HYPERTENSION: Chronic | ICD-10-CM

## 2017-11-15 DIAGNOSIS — K21.9 GASTROESOPHAGEAL REFLUX DISEASE WITHOUT ESOPHAGITIS: Chronic | ICD-10-CM

## 2017-11-15 DIAGNOSIS — M25.561 CHRONIC PAIN OF RIGHT KNEE: ICD-10-CM

## 2017-11-15 PROCEDURE — 99213 OFFICE O/P EST LOW 20 MIN: CPT | Performed by: SURGERY

## 2017-11-15 NOTE — PROGRESS NOTES
loss.  I did give her a prescription for Saxenda for her to start after her current treatment is completed      I spent approximately 15 minutes with the patient, with at least 50% of the time being spent on counseling for nutrition and exercise.

## 2017-12-19 ENCOUNTER — OFFICE VISIT (OUTPATIENT)
Dept: BARIATRICS/WEIGHT MGMT | Age: 62
End: 2017-12-19
Payer: COMMERCIAL

## 2017-12-19 VITALS
DIASTOLIC BLOOD PRESSURE: 78 MMHG | HEART RATE: 76 BPM | BODY MASS INDEX: 43.23 KG/M2 | WEIGHT: 229 LBS | HEIGHT: 61 IN | SYSTOLIC BLOOD PRESSURE: 114 MMHG

## 2017-12-19 DIAGNOSIS — I10 ESSENTIAL HYPERTENSION: Primary | Chronic | ICD-10-CM

## 2017-12-19 DIAGNOSIS — R73.03 PREDIABETES: ICD-10-CM

## 2017-12-19 PROCEDURE — 99213 OFFICE O/P EST LOW 20 MIN: CPT | Performed by: SURGERY

## 2017-12-19 NOTE — PROGRESS NOTES
The patient is here today for continued supervised weight loss in preparation for undergoing metabolic surgery. She reports that she is working on the behavior changes discussed at her initial appointment and has ongoing collaboration with our RD to continue this work. Overall, the patient reports that she is having great success with her behavior changes and compliance with our program.       Weight is down 6 lbs. Exercising? :  yes    Patient is exhibiting compliance with behavior modification: yes    Vitals:    12/19/17 1302   BP: 114/78   Pulse: 76   Weight: 229 lb (103.9 kg)   Height: 5' 1\" (1.549 m)     Body mass index is 43.27 kg/m². Gen: Alert and oriented x 3; NAD  HEENT: MMM No Rhinorrhea, EOMI; PERRLA; Thyroid not enlarged; no cervical nodes  ABD:  Soft, Non-Tender, Non-distended, No rebound or guarding. Ext:  Movement x 4. No edema  Neuro: Cranial Nerves Grossly Intact; nml coordination, no pronator drift     Assessment:    Patient Active Problem List   Diagnosis    HTN (hypertension)    Sleep apnea    GERD (gastroesophageal reflux disease)    Vision abnormalities    Hypothyroid    Depression    Osteoporosis ( Mother)    Osteopenia    Asthma    S/P partial gastrectomy    S/P abdominoplasty    Obesity, morbid, BMI 40.0-49.9 (HCC)    Rectocele 3    Vaginal enterocele 1    NORMAN (stress urinary incontinence, female)    Open abdominal wall wound    Chronic pain of right knee    Prediabetes                              HTN-improving  Morbid Obesity-stable      Plan:  Continue working with staff on behavior changes  Encouraged to journal all food intake. Keep calorie level at approximately 0619-8859 or as determined by RD. Protein intake is to be a minimum of 60-80 grams per day. Water drinking was encouraged with a goal of 64oz-128oz daily.     I also emphasized the need for use of the exercise log              I spent approximately 15 minutes with the patient, with at least 50% of the time being spent on counseling for nutrition and exercise.

## 2018-02-21 ENCOUNTER — HOSPITAL ENCOUNTER (OUTPATIENT)
Age: 63
Setting detail: SPECIMEN
Discharge: HOME | End: 2018-02-21
Payer: COMMERCIAL

## 2018-02-21 LAB
CHOLESTEROL/HDL RATIO: 3.5
CHOLESTEROL: 203 MG/DL
HDLC SERPL-MCNC: 58 MG/DL
LDL CHOLESTEROL: 107 MG/DL (ref 0–130)
TRIGL SERPL-MCNC: 191 MG/DL
VLDLC SERPL CALC-MCNC: ABNORMAL MG/DL (ref 1–30)

## 2018-04-02 ENCOUNTER — HOSPITAL ENCOUNTER (OUTPATIENT)
Dept: WOMENS IMAGING | Age: 63
Discharge: HOME | End: 2018-04-04
Payer: COMMERCIAL

## 2018-04-02 DIAGNOSIS — Z13.820 SCREENING FOR OSTEOPOROSIS: ICD-10-CM

## 2018-04-02 DIAGNOSIS — Z01.419 WELL WOMAN EXAM WITH ROUTINE GYNECOLOGICAL EXAM: ICD-10-CM

## 2018-04-02 PROCEDURE — 77080 DXA BONE DENSITY AXIAL: CPT

## 2018-04-13 ENCOUNTER — OFFICE VISIT (OUTPATIENT)
Dept: ORTHOPEDIC SURGERY | Age: 63
End: 2018-04-13
Payer: COMMERCIAL

## 2018-04-13 VITALS — HEART RATE: 89 BPM | RESPIRATION RATE: 18 BRPM | HEIGHT: 61 IN | WEIGHT: 238 LBS | BODY MASS INDEX: 44.93 KG/M2

## 2018-04-13 DIAGNOSIS — M25.511 ACUTE PAIN OF RIGHT SHOULDER: Primary | ICD-10-CM

## 2018-04-13 PROCEDURE — 99213 OFFICE O/P EST LOW 20 MIN: CPT | Performed by: ORTHOPAEDIC SURGERY

## 2018-04-19 ENCOUNTER — HOSPITAL ENCOUNTER (OUTPATIENT)
Dept: MRI IMAGING | Age: 63
Discharge: HOME | End: 2018-04-21
Payer: COMMERCIAL

## 2018-04-19 DIAGNOSIS — M25.511 ACUTE PAIN OF RIGHT SHOULDER: ICD-10-CM

## 2018-04-19 PROCEDURE — 73221 MRI JOINT UPR EXTREM W/O DYE: CPT

## 2018-04-23 ENCOUNTER — TELEPHONE (OUTPATIENT)
Dept: ORTHOPEDIC SURGERY | Age: 63
End: 2018-04-23

## 2018-04-30 ENCOUNTER — OFFICE VISIT (OUTPATIENT)
Dept: ORTHOPEDIC SURGERY | Age: 63
End: 2018-04-30
Payer: COMMERCIAL

## 2018-04-30 VITALS — BODY MASS INDEX: 45.31 KG/M2 | HEIGHT: 61 IN | WEIGHT: 240 LBS

## 2018-04-30 DIAGNOSIS — M75.121 COMPLETE TEAR OF RIGHT ROTATOR CUFF: Primary | ICD-10-CM

## 2018-04-30 PROCEDURE — 99213 OFFICE O/P EST LOW 20 MIN: CPT | Performed by: ORTHOPAEDIC SURGERY

## 2018-05-29 ENCOUNTER — HOSPITAL ENCOUNTER (OUTPATIENT)
Dept: PREADMISSION TESTING | Age: 63
Discharge: HOME | End: 2018-06-02
Payer: COMMERCIAL

## 2018-05-29 VITALS
TEMPERATURE: 97.2 F | WEIGHT: 245 LBS | OXYGEN SATURATION: 94 % | DIASTOLIC BLOOD PRESSURE: 80 MMHG | RESPIRATION RATE: 16 BRPM | BODY MASS INDEX: 46.26 KG/M2 | HEIGHT: 61 IN | HEART RATE: 77 BPM | SYSTOLIC BLOOD PRESSURE: 122 MMHG

## 2018-05-29 LAB
ABSOLUTE EOS #: 0.3 K/UL (ref 0–0.4)
ABSOLUTE IMMATURE GRANULOCYTE: ABNORMAL K/UL (ref 0–0.3)
ABSOLUTE LYMPH #: 2.5 K/UL (ref 1–4.8)
ABSOLUTE MONO #: 0.6 K/UL (ref 0.1–1.3)
ANION GAP SERPL CALCULATED.3IONS-SCNC: 12 MMOL/L (ref 9–17)
BASOPHILS # BLD: 0 % (ref 0–2)
BASOPHILS ABSOLUTE: 0 K/UL (ref 0–0.2)
BUN BLDV-MCNC: 19 MG/DL (ref 8–23)
BUN/CREAT BLD: ABNORMAL (ref 9–20)
CALCIUM SERPL-MCNC: 9.2 MG/DL (ref 8.6–10.4)
CHLORIDE BLD-SCNC: 102 MMOL/L (ref 98–107)
CO2: 28 MMOL/L (ref 20–31)
CREAT SERPL-MCNC: 0.73 MG/DL (ref 0.5–0.9)
DIFFERENTIAL TYPE: ABNORMAL
EKG ATRIAL RATE: 78 BPM
EKG P AXIS: 39 DEGREES
EKG P-R INTERVAL: 146 MS
EKG Q-T INTERVAL: 400 MS
EKG QRS DURATION: 84 MS
EKG QTC CALCULATION (BAZETT): 456 MS
EKG R AXIS: 11 DEGREES
EKG T AXIS: 34 DEGREES
EKG VENTRICULAR RATE: 78 BPM
EOSINOPHILS RELATIVE PERCENT: 4 % (ref 0–4)
GFR AFRICAN AMERICAN: >60 ML/MIN
GFR NON-AFRICAN AMERICAN: >60 ML/MIN
GFR SERPL CREATININE-BSD FRML MDRD: ABNORMAL ML/MIN/{1.73_M2}
GFR SERPL CREATININE-BSD FRML MDRD: ABNORMAL ML/MIN/{1.73_M2}
GLUCOSE BLD-MCNC: 112 MG/DL (ref 70–99)
HCT VFR BLD CALC: 37.5 % (ref 36–46)
HEMOGLOBIN: 12.4 G/DL (ref 12–16)
IMMATURE GRANULOCYTES: ABNORMAL %
LYMPHOCYTES # BLD: 31 % (ref 24–44)
MCH RBC QN AUTO: 28.9 PG (ref 26–34)
MCHC RBC AUTO-ENTMCNC: 33 G/DL (ref 31–37)
MCV RBC AUTO: 87.6 FL (ref 80–100)
MONOCYTES # BLD: 7 % (ref 1–7)
NRBC AUTOMATED: ABNORMAL PER 100 WBC
PDW BLD-RTO: 16.9 % (ref 11.5–14.9)
PLATELET # BLD: 293 K/UL (ref 150–450)
PLATELET ESTIMATE: ABNORMAL
PMV BLD AUTO: 8 FL (ref 6–12)
POTASSIUM SERPL-SCNC: 4.3 MMOL/L (ref 3.7–5.3)
RBC # BLD: 4.28 M/UL (ref 4–5.2)
RBC # BLD: ABNORMAL 10*6/UL
SEG NEUTROPHILS: 58 % (ref 36–66)
SEGMENTED NEUTROPHILS ABSOLUTE COUNT: 4.7 K/UL (ref 1.3–9.1)
SODIUM BLD-SCNC: 142 MMOL/L (ref 135–144)
WBC # BLD: 8.1 K/UL (ref 3.5–11)
WBC # BLD: ABNORMAL 10*3/UL

## 2018-05-29 PROCEDURE — 93005 ELECTROCARDIOGRAM TRACING: CPT

## 2018-05-29 PROCEDURE — 36415 COLL VENOUS BLD VENIPUNCTURE: CPT

## 2018-05-29 PROCEDURE — 85025 COMPLETE CBC W/AUTO DIFF WBC: CPT

## 2018-05-29 PROCEDURE — 80048 BASIC METABOLIC PNL TOTAL CA: CPT

## 2018-05-29 RX ORDER — CYCLOBENZAPRINE HCL 10 MG
10 TABLET ORAL 3 TIMES DAILY PRN
COMMUNITY
End: 2018-07-31 | Stop reason: ALTCHOICE

## 2018-05-30 ENCOUNTER — ANESTHESIA EVENT (OUTPATIENT)
Dept: OPERATING ROOM | Age: 63
End: 2018-05-30
Payer: COMMERCIAL

## 2018-05-30 RX ORDER — SODIUM CHLORIDE 0.9 % (FLUSH) 0.9 %
10 SYRINGE (ML) INJECTION PRN
Status: CANCELLED | OUTPATIENT
Start: 2018-05-30

## 2018-05-30 RX ORDER — LIDOCAINE HYDROCHLORIDE 10 MG/ML
1 INJECTION, SOLUTION EPIDURAL; INFILTRATION; INTRACAUDAL; PERINEURAL
Status: CANCELLED | OUTPATIENT
Start: 2018-05-30 | End: 2018-05-30

## 2018-05-30 RX ORDER — SODIUM CHLORIDE, SODIUM LACTATE, POTASSIUM CHLORIDE, CALCIUM CHLORIDE 600; 310; 30; 20 MG/100ML; MG/100ML; MG/100ML; MG/100ML
INJECTION, SOLUTION INTRAVENOUS CONTINUOUS
Status: CANCELLED | OUTPATIENT
Start: 2018-05-30

## 2018-05-30 RX ORDER — SODIUM CHLORIDE 0.9 % (FLUSH) 0.9 %
10 SYRINGE (ML) INJECTION EVERY 12 HOURS SCHEDULED
Status: CANCELLED | OUTPATIENT
Start: 2018-05-30

## 2018-06-05 ENCOUNTER — OFFICE VISIT (OUTPATIENT)
Dept: ORTHOPEDIC SURGERY | Age: 63
End: 2018-06-05
Payer: COMMERCIAL

## 2018-06-05 VITALS — BODY MASS INDEX: 46.24 KG/M2 | HEIGHT: 61 IN | WEIGHT: 244.93 LBS

## 2018-06-05 DIAGNOSIS — M75.100 TEAR OF ROTATOR CUFF, UNSPECIFIED LATERALITY, UNSPECIFIED TEAR EXTENT: Primary | ICD-10-CM

## 2018-06-05 PROCEDURE — 99213 OFFICE O/P EST LOW 20 MIN: CPT | Performed by: ORTHOPAEDIC SURGERY

## 2018-06-05 RX ORDER — HYDROCODONE BITARTRATE AND ACETAMINOPHEN 7.5; 325 MG/1; MG/1
TABLET ORAL
Qty: 40 TABLET | Refills: 0 | Status: SHIPPED | OUTPATIENT
Start: 2018-06-05 | End: 2018-06-12

## 2018-06-05 RX ORDER — ONDANSETRON 4 MG/1
4 TABLET, FILM COATED ORAL DAILY PRN
Qty: 20 TABLET | Refills: 0 | Status: SHIPPED | OUTPATIENT
Start: 2018-06-05 | End: 2018-11-21

## 2018-06-05 RX ORDER — HYDROCODONE BITARTRATE AND ACETAMINOPHEN 7.5; 325 MG/1; MG/1
TABLET ORAL
Qty: 40 TABLET | Refills: 0 | Status: SHIPPED | OUTPATIENT
Start: 2018-06-05 | End: 2018-06-05 | Stop reason: CLARIF

## 2018-06-05 RX ORDER — ONDANSETRON 4 MG/1
4 TABLET, FILM COATED ORAL DAILY PRN
Qty: 20 TABLET | Refills: 0 | Status: SHIPPED | OUTPATIENT
Start: 2018-06-05 | End: 2018-06-05 | Stop reason: CLARIF

## 2018-06-11 ENCOUNTER — PREP FOR PROCEDURE (OUTPATIENT)
Dept: ORTHOPEDIC SURGERY | Age: 63
End: 2018-06-11

## 2018-06-11 ENCOUNTER — HOSPITAL ENCOUNTER (OUTPATIENT)
Age: 63
Setting detail: OUTPATIENT SURGERY
Discharge: HOME | End: 2018-06-11
Attending: ORTHOPAEDIC SURGERY | Admitting: ORTHOPAEDIC SURGERY
Payer: COMMERCIAL

## 2018-06-11 ENCOUNTER — ANESTHESIA (OUTPATIENT)
Dept: OPERATING ROOM | Age: 63
End: 2018-06-11
Payer: COMMERCIAL

## 2018-06-11 VITALS
HEART RATE: 90 BPM | OXYGEN SATURATION: 92 % | TEMPERATURE: 97.2 F | DIASTOLIC BLOOD PRESSURE: 87 MMHG | RESPIRATION RATE: 16 BRPM | SYSTOLIC BLOOD PRESSURE: 146 MMHG

## 2018-06-11 VITALS — SYSTOLIC BLOOD PRESSURE: 118 MMHG | DIASTOLIC BLOOD PRESSURE: 74 MMHG | TEMPERATURE: 94.8 F | OXYGEN SATURATION: 95 %

## 2018-06-11 DIAGNOSIS — M75.121 COMPLETE TEAR OF RIGHT ROTATOR CUFF: Primary | ICD-10-CM

## 2018-06-11 PROCEDURE — 7100000030 HC ASPR PHASE II RECOVERY - FIRST 15 MIN: Performed by: ORTHOPAEDIC SURGERY

## 2018-06-11 PROCEDURE — 7100000001 HC PACU RECOVERY - ADDTL 15 MIN: Performed by: ORTHOPAEDIC SURGERY

## 2018-06-11 PROCEDURE — 2580000003 HC RX 258: Performed by: ANESTHESIOLOGY

## 2018-06-11 PROCEDURE — 2720000010 HC SURG SUPPLY STERILE: Performed by: ORTHOPAEDIC SURGERY

## 2018-06-11 PROCEDURE — 29823 SHO ARTHRS SRG XTNSV DBRDMT: CPT | Performed by: ORTHOPAEDIC SURGERY

## 2018-06-11 PROCEDURE — 64415 NJX AA&/STRD BRCH PLXS IMG: CPT | Performed by: ANESTHESIOLOGY

## 2018-06-11 PROCEDURE — C1889 IMPLANT/INSERT DEVICE, NOC: HCPCS | Performed by: ORTHOPAEDIC SURGERY

## 2018-06-11 PROCEDURE — 3700000001 HC ADD 15 MINUTES (ANESTHESIA): Performed by: ORTHOPAEDIC SURGERY

## 2018-06-11 PROCEDURE — 2580000003 HC RX 258

## 2018-06-11 PROCEDURE — 6360000002 HC RX W HCPCS: Performed by: NURSE ANESTHETIST, CERTIFIED REGISTERED

## 2018-06-11 PROCEDURE — 29827 SHO ARTHRS SRG RT8TR CUF RPR: CPT | Performed by: ORTHOPAEDIC SURGERY

## 2018-06-11 PROCEDURE — C1713 ANCHOR/SCREW BN/BN,TIS/BN: HCPCS | Performed by: ORTHOPAEDIC SURGERY

## 2018-06-11 PROCEDURE — 2500000003 HC RX 250 WO HCPCS: Performed by: NURSE ANESTHETIST, CERTIFIED REGISTERED

## 2018-06-11 PROCEDURE — 7100000000 HC PACU RECOVERY - FIRST 15 MIN: Performed by: ORTHOPAEDIC SURGERY

## 2018-06-11 PROCEDURE — 6360000002 HC RX W HCPCS: Performed by: ORTHOPAEDIC SURGERY

## 2018-06-11 PROCEDURE — 6360000002 HC RX W HCPCS: Performed by: ANESTHESIOLOGY

## 2018-06-11 PROCEDURE — 6360000002 HC RX W HCPCS

## 2018-06-11 PROCEDURE — 3700000000 HC ANESTHESIA ATTENDED CARE: Performed by: ORTHOPAEDIC SURGERY

## 2018-06-11 PROCEDURE — 3600000003 HC SURGERY LEVEL 3 BASE: Performed by: ORTHOPAEDIC SURGERY

## 2018-06-11 PROCEDURE — 7100000031 HC ASPR PHASE II RECOVERY - ADDTL 15 MIN: Performed by: ORTHOPAEDIC SURGERY

## 2018-06-11 PROCEDURE — 3600000013 HC SURGERY LEVEL 3 ADDTL 15MIN: Performed by: ORTHOPAEDIC SURGERY

## 2018-06-11 DEVICE — ANCHOR SUT L14.7MM DIA5.5MM BIOCOM SZ 2 ORDER MULT OF 5EA: Type: IMPLANTABLE DEVICE | Site: SHOULDER | Status: FUNCTIONAL

## 2018-06-11 DEVICE — ANCHOR SUT L24.5MM DIA4.75MM BIOCOMPOSITE SELF PUNCHING: Type: IMPLANTABLE DEVICE | Site: SHOULDER | Status: FUNCTIONAL

## 2018-06-11 RX ORDER — TRAMADOL HYDROCHLORIDE 50 MG/1
50 TABLET ORAL EVERY 6 HOURS PRN
Status: ON HOLD | COMMUNITY
End: 2018-06-11 | Stop reason: HOSPADM

## 2018-06-11 RX ORDER — SODIUM CHLORIDE 0.9 % (FLUSH) 0.9 %
10 SYRINGE (ML) INJECTION PRN
Status: DISCONTINUED | OUTPATIENT
Start: 2018-06-11 | End: 2018-06-11 | Stop reason: HOSPADM

## 2018-06-11 RX ORDER — EPHEDRINE SULFATE/0.9% NACL/PF 50 MG/5 ML
SYRINGE (ML) INTRAVENOUS PRN
Status: DISCONTINUED | OUTPATIENT
Start: 2018-06-11 | End: 2018-06-11 | Stop reason: SDUPTHER

## 2018-06-11 RX ORDER — SODIUM CHLORIDE 0.9 % (FLUSH) 0.9 %
10 SYRINGE (ML) INJECTION EVERY 12 HOURS SCHEDULED
Status: CANCELLED | OUTPATIENT
Start: 2018-06-11

## 2018-06-11 RX ORDER — CEFAZOLIN SODIUM 1 G/3ML
INJECTION, POWDER, FOR SOLUTION INTRAMUSCULAR; INTRAVENOUS
Status: DISCONTINUED
Start: 2018-06-11 | End: 2018-06-11 | Stop reason: HOSPADM

## 2018-06-11 RX ORDER — SODIUM CHLORIDE 0.9 % (FLUSH) 0.9 %
10 SYRINGE (ML) INJECTION EVERY 12 HOURS SCHEDULED
Status: DISCONTINUED | OUTPATIENT
Start: 2018-06-11 | End: 2018-06-11 | Stop reason: HOSPADM

## 2018-06-11 RX ORDER — LIDOCAINE HYDROCHLORIDE 10 MG/ML
1 INJECTION, SOLUTION EPIDURAL; INFILTRATION; INTRACAUDAL; PERINEURAL
Status: DISCONTINUED | OUTPATIENT
Start: 2018-06-11 | End: 2018-06-11 | Stop reason: HOSPADM

## 2018-06-11 RX ORDER — DEXAMETHASONE SODIUM PHOSPHATE 10 MG/ML
10 INJECTION INTRAMUSCULAR; INTRAVENOUS ONCE
Status: CANCELLED | OUTPATIENT
Start: 2018-06-11 | End: 2018-06-11

## 2018-06-11 RX ORDER — PROPOFOL 10 MG/ML
INJECTION, EMULSION INTRAVENOUS PRN
Status: DISCONTINUED | OUTPATIENT
Start: 2018-06-11 | End: 2018-06-11 | Stop reason: SDUPTHER

## 2018-06-11 RX ORDER — MIDAZOLAM HYDROCHLORIDE 1 MG/ML
INJECTION INTRAMUSCULAR; INTRAVENOUS PRN
Status: DISCONTINUED | OUTPATIENT
Start: 2018-06-11 | End: 2018-06-11 | Stop reason: SDUPTHER

## 2018-06-11 RX ORDER — SODIUM CHLORIDE, SODIUM LACTATE, POTASSIUM CHLORIDE, CALCIUM CHLORIDE 600; 310; 30; 20 MG/100ML; MG/100ML; MG/100ML; MG/100ML
INJECTION, SOLUTION INTRAVENOUS CONTINUOUS
Status: DISCONTINUED | OUTPATIENT
Start: 2018-06-11 | End: 2018-06-11 | Stop reason: HOSPADM

## 2018-06-11 RX ORDER — FENTANYL CITRATE 50 UG/ML
INJECTION, SOLUTION INTRAMUSCULAR; INTRAVENOUS PRN
Status: DISCONTINUED | OUTPATIENT
Start: 2018-06-11 | End: 2018-06-11 | Stop reason: SDUPTHER

## 2018-06-11 RX ORDER — SODIUM CHLORIDE 0.9 % (FLUSH) 0.9 %
10 SYRINGE (ML) INJECTION PRN
Status: CANCELLED | OUTPATIENT
Start: 2018-06-11

## 2018-06-11 RX ORDER — ROCURONIUM BROMIDE 10 MG/ML
INJECTION, SOLUTION INTRAVENOUS PRN
Status: DISCONTINUED | OUTPATIENT
Start: 2018-06-11 | End: 2018-06-11 | Stop reason: SDUPTHER

## 2018-06-11 RX ORDER — MORPHINE SULFATE 8 MG/ML
INJECTION, SOLUTION INTRAMUSCULAR; INTRAVENOUS PRN
Status: DISCONTINUED | OUTPATIENT
Start: 2018-06-11 | End: 2018-06-11 | Stop reason: SDUPTHER

## 2018-06-11 RX ORDER — DEXAMETHASONE SODIUM PHOSPHATE 10 MG/ML
10 INJECTION INTRAMUSCULAR; INTRAVENOUS ONCE
Status: COMPLETED | OUTPATIENT
Start: 2018-06-11 | End: 2018-06-11

## 2018-06-11 RX ORDER — ONDANSETRON 2 MG/ML
INJECTION INTRAMUSCULAR; INTRAVENOUS PRN
Status: DISCONTINUED | OUTPATIENT
Start: 2018-06-11 | End: 2018-06-11 | Stop reason: SDUPTHER

## 2018-06-11 RX ADMIN — FENTANYL CITRATE 100 MCG: 50 INJECTION, SOLUTION INTRAMUSCULAR; INTRAVENOUS at 13:04

## 2018-06-11 RX ADMIN — SODIUM CHLORIDE, POTASSIUM CHLORIDE, SODIUM LACTATE AND CALCIUM CHLORIDE: 600; 310; 30; 20 INJECTION, SOLUTION INTRAVENOUS at 11:39

## 2018-06-11 RX ADMIN — ONDANSETRON 4 MG: 2 INJECTION, SOLUTION INTRAMUSCULAR; INTRAVENOUS at 15:13

## 2018-06-11 RX ADMIN — ROCURONIUM BROMIDE 50 MG: 10 INJECTION INTRAVENOUS at 13:04

## 2018-06-11 RX ADMIN — PHENYLEPHRINE HYDROCHLORIDE 100 MCG: 10 INJECTION INTRAVENOUS at 13:31

## 2018-06-11 RX ADMIN — DEXAMETHASONE SODIUM PHOSPHATE 10 MG: 10 INJECTION INTRAMUSCULAR; INTRAVENOUS at 13:11

## 2018-06-11 RX ADMIN — Medication 10 MG: at 13:15

## 2018-06-11 RX ADMIN — PHENYLEPHRINE HYDROCHLORIDE 100 MCG: 10 INJECTION INTRAVENOUS at 15:05

## 2018-06-11 RX ADMIN — PROPOFOL 200 MG: 10 INJECTION, EMULSION INTRAVENOUS at 13:04

## 2018-06-11 RX ADMIN — MIDAZOLAM 2 MG: 1 INJECTION INTRAMUSCULAR; INTRAVENOUS at 12:57

## 2018-06-11 RX ADMIN — Medication 10 MG: at 13:22

## 2018-06-11 RX ADMIN — MORPHINE SULFATE 8 MG: 8 INJECTION INTRAVENOUS at 14:35

## 2018-06-11 RX ADMIN — Medication 2 G: at 12:57

## 2018-06-11 RX ADMIN — HYDROMORPHONE HYDROCHLORIDE 0.5 MG: 1 INJECTION, SOLUTION INTRAMUSCULAR; INTRAVENOUS; SUBCUTANEOUS at 15:10

## 2018-06-11 RX ADMIN — Medication 10 MG: at 14:25

## 2018-06-11 RX ADMIN — PHENYLEPHRINE HYDROCHLORIDE 100 MCG: 10 INJECTION INTRAVENOUS at 14:40

## 2018-06-11 RX ADMIN — PHENYLEPHRINE HYDROCHLORIDE 100 MCG: 10 INJECTION INTRAVENOUS at 13:54

## 2018-06-11 RX ADMIN — SODIUM CHLORIDE, POTASSIUM CHLORIDE, SODIUM LACTATE AND CALCIUM CHLORIDE: 600; 310; 30; 20 INJECTION, SOLUTION INTRAVENOUS at 13:32

## 2018-06-11 RX ADMIN — FENTANYL CITRATE 50 MCG: 50 INJECTION, SOLUTION INTRAMUSCULAR; INTRAVENOUS at 14:05

## 2018-06-11 RX ADMIN — FENTANYL CITRATE 50 MCG: 50 INJECTION, SOLUTION INTRAMUSCULAR; INTRAVENOUS at 13:44

## 2018-06-11 RX ADMIN — HYDROMORPHONE HYDROCHLORIDE 1 MG: 1 INJECTION, SOLUTION INTRAMUSCULAR; INTRAVENOUS; SUBCUTANEOUS at 14:55

## 2018-06-11 RX ADMIN — PHENYLEPHRINE HYDROCHLORIDE 100 MCG: 10 INJECTION INTRAVENOUS at 14:15

## 2018-06-11 RX ADMIN — SODIUM CHLORIDE, POTASSIUM CHLORIDE, SODIUM LACTATE AND CALCIUM CHLORIDE: 600; 310; 30; 20 INJECTION, SOLUTION INTRAVENOUS at 12:57

## 2018-06-11 RX ADMIN — FENTANYL CITRATE 100 MCG: 50 INJECTION, SOLUTION INTRAMUSCULAR; INTRAVENOUS at 12:57

## 2018-06-11 ASSESSMENT — PULMONARY FUNCTION TESTS
PIF_VALUE: 16
PIF_VALUE: 16
PIF_VALUE: 19
PIF_VALUE: 18
PIF_VALUE: 3
PIF_VALUE: 18
PIF_VALUE: 19
PIF_VALUE: 26
PIF_VALUE: 18
PIF_VALUE: 16
PIF_VALUE: 23
PIF_VALUE: 15
PIF_VALUE: 19
PIF_VALUE: 19
PIF_VALUE: 7
PIF_VALUE: 19
PIF_VALUE: 19
PIF_VALUE: 15
PIF_VALUE: 19
PIF_VALUE: 16
PIF_VALUE: 18
PIF_VALUE: 27
PIF_VALUE: 0
PIF_VALUE: 16
PIF_VALUE: 20
PIF_VALUE: 19
PIF_VALUE: 16
PIF_VALUE: 17
PIF_VALUE: 16
PIF_VALUE: 5
PIF_VALUE: 19
PIF_VALUE: 16
PIF_VALUE: 15
PIF_VALUE: 19
PIF_VALUE: 4
PIF_VALUE: 19
PIF_VALUE: 17
PIF_VALUE: 15
PIF_VALUE: 16
PIF_VALUE: 15
PIF_VALUE: 17
PIF_VALUE: 24
PIF_VALUE: 18
PIF_VALUE: 19
PIF_VALUE: 18
PIF_VALUE: 0
PIF_VALUE: 18
PIF_VALUE: 0
PIF_VALUE: 38
PIF_VALUE: 16
PIF_VALUE: 18
PIF_VALUE: 24
PIF_VALUE: 0
PIF_VALUE: 19
PIF_VALUE: 14
PIF_VALUE: 4
PIF_VALUE: 2
PIF_VALUE: 20
PIF_VALUE: 19
PIF_VALUE: 16
PIF_VALUE: 20
PIF_VALUE: 15
PIF_VALUE: 16
PIF_VALUE: 15
PIF_VALUE: 18
PIF_VALUE: 19
PIF_VALUE: 16
PIF_VALUE: 17
PIF_VALUE: 19
PIF_VALUE: 15
PIF_VALUE: 16
PIF_VALUE: 19
PIF_VALUE: 16
PIF_VALUE: 19
PIF_VALUE: 18
PIF_VALUE: 17
PIF_VALUE: 18
PIF_VALUE: 16
PIF_VALUE: 16
PIF_VALUE: 15
PIF_VALUE: 17
PIF_VALUE: 17
PIF_VALUE: 28
PIF_VALUE: 16
PIF_VALUE: 18
PIF_VALUE: 16
PIF_VALUE: 19
PIF_VALUE: 5
PIF_VALUE: 19
PIF_VALUE: 17
PIF_VALUE: 15
PIF_VALUE: 18
PIF_VALUE: 19
PIF_VALUE: 18
PIF_VALUE: 16
PIF_VALUE: 16
PIF_VALUE: 15
PIF_VALUE: 19
PIF_VALUE: 21
PIF_VALUE: 20
PIF_VALUE: 16
PIF_VALUE: 19
PIF_VALUE: 2
PIF_VALUE: 19
PIF_VALUE: 18
PIF_VALUE: 23
PIF_VALUE: 32
PIF_VALUE: 25
PIF_VALUE: 19
PIF_VALUE: 15
PIF_VALUE: 19
PIF_VALUE: 16
PIF_VALUE: 16
PIF_VALUE: 5
PIF_VALUE: 15
PIF_VALUE: 18
PIF_VALUE: 19
PIF_VALUE: 36
PIF_VALUE: 18
PIF_VALUE: 19
PIF_VALUE: 15
PIF_VALUE: 19
PIF_VALUE: 15
PIF_VALUE: 17
PIF_VALUE: 18
PIF_VALUE: 19
PIF_VALUE: 19
PIF_VALUE: 16
PIF_VALUE: 2
PIF_VALUE: 19
PIF_VALUE: 18
PIF_VALUE: 19
PIF_VALUE: 5
PIF_VALUE: 17
PIF_VALUE: 15
PIF_VALUE: 18
PIF_VALUE: 4
PIF_VALUE: 20
PIF_VALUE: 15
PIF_VALUE: 4
PIF_VALUE: 19
PIF_VALUE: 27
PIF_VALUE: 18
PIF_VALUE: 16
PIF_VALUE: 15
PIF_VALUE: 6
PIF_VALUE: 18
PIF_VALUE: 19
PIF_VALUE: 15
PIF_VALUE: 19

## 2018-06-11 ASSESSMENT — PAIN SCALES - GENERAL
PAINLEVEL_OUTOF10: 0

## 2018-06-11 ASSESSMENT — PAIN DESCRIPTION - DESCRIPTORS: DESCRIPTORS: ACHING

## 2018-06-11 ASSESSMENT — PAIN - FUNCTIONAL ASSESSMENT: PAIN_FUNCTIONAL_ASSESSMENT: 0-10

## 2018-06-26 ENCOUNTER — OFFICE VISIT (OUTPATIENT)
Dept: ORTHOPEDIC SURGERY | Age: 63
End: 2018-06-26

## 2018-06-26 VITALS — BODY MASS INDEX: 45.31 KG/M2 | WEIGHT: 240 LBS | HEIGHT: 61 IN

## 2018-06-26 DIAGNOSIS — Z98.890 STATUS POST ROTATOR CUFF REPAIR: Primary | ICD-10-CM

## 2018-06-26 PROCEDURE — 99024 POSTOP FOLLOW-UP VISIT: CPT | Performed by: ORTHOPAEDIC SURGERY

## 2018-06-27 ENCOUNTER — TELEPHONE (OUTPATIENT)
Dept: ORTHOPEDIC SURGERY | Age: 63
End: 2018-06-27

## 2018-07-05 ENCOUNTER — TELEPHONE (OUTPATIENT)
Dept: ORTHOPEDIC SURGERY | Age: 63
End: 2018-07-05

## 2018-07-23 ENCOUNTER — OFFICE VISIT (OUTPATIENT)
Dept: ORTHOPEDIC SURGERY | Age: 63
End: 2018-07-23

## 2018-07-23 VITALS — HEIGHT: 61 IN | BODY MASS INDEX: 43.8 KG/M2 | WEIGHT: 232 LBS

## 2018-07-23 DIAGNOSIS — M75.121 COMPLETE TEAR OF RIGHT ROTATOR CUFF: Primary | ICD-10-CM

## 2018-07-23 PROCEDURE — 99024 POSTOP FOLLOW-UP VISIT: CPT | Performed by: ORTHOPAEDIC SURGERY

## 2018-07-23 NOTE — PROGRESS NOTES
Procedure: Right shoulder arthroscopic rotator cuff repair  Date of procedure: 6/11/18    HPI: Ms. Madison Baez is a 61-year-old who is approximately 6 weeks status post right shoulder arthroscopic rotator cuff repair of a large rotator cuff tear. She states that she is doing well at this point. She denies having any pain in her shoulder. She has been compliant with her immobilizer as well as pendulum exercises. Physical examination: Evaluation of her right shoulder and upper extremity demonstrates her incisions to be healed. There is no warmth, erythema, or wound dehiscence. Sensation is grossly intact light touch in all dermatomes and she has a 2+ radial pulse with brisk capillary refill in her fingers. Impression and plan: Ms. Madison Baez is a 61-year-old who is 6 weeks status post a right shoulder arthroscopic rotator cuff repair. She continues to do well clinically at this time. Her splint was discontinued today and she was started on a home exercise program focusing on active assisted range of motion. She may start using the arm for light activities of daily living. I'll have her follow up in my clinic in 6 weeks for reevaluation but she was encouraged to return or call earlier with questions and/or concerns.

## 2018-07-31 ENCOUNTER — HOSPITAL ENCOUNTER (OUTPATIENT)
Dept: WOUND CARE | Age: 63
Discharge: HOME | End: 2018-07-31
Payer: COMMERCIAL

## 2018-07-31 VITALS
HEIGHT: 61 IN | TEMPERATURE: 98.3 F | RESPIRATION RATE: 18 BRPM | WEIGHT: 232 LBS | HEART RATE: 75 BPM | DIASTOLIC BLOOD PRESSURE: 85 MMHG | BODY MASS INDEX: 43.8 KG/M2 | SYSTOLIC BLOOD PRESSURE: 122 MMHG

## 2018-07-31 DIAGNOSIS — L98.492 ABDOMINAL WALL ULCER, WITH FAT LAYER EXPOSED (HCC): ICD-10-CM

## 2018-07-31 PROCEDURE — 11042 DBRDMT SUBQ TIS 1ST 20SQCM/<: CPT | Performed by: NURSE PRACTITIONER

## 2018-07-31 PROCEDURE — 11042 DBRDMT SUBQ TIS 1ST 20SQCM/<: CPT

## 2018-07-31 PROCEDURE — 99213 OFFICE O/P EST LOW 20 MIN: CPT

## 2018-07-31 PROCEDURE — 99202 OFFICE O/P NEW SF 15 MIN: CPT | Performed by: NURSE PRACTITIONER

## 2018-07-31 PROCEDURE — 6370000000 HC RX 637 (ALT 250 FOR IP): Performed by: NURSE PRACTITIONER

## 2018-07-31 RX ORDER — LIDOCAINE HYDROCHLORIDE 40 MG/ML
SOLUTION TOPICAL ONCE
Status: COMPLETED | OUTPATIENT
Start: 2018-07-31 | End: 2018-07-31

## 2018-07-31 RX ADMIN — LIDOCAINE HYDROCHLORIDE 5 ML: 40 SOLUTION TOPICAL at 09:05

## 2018-07-31 NOTE — PROGRESS NOTES
torn meniscus    AL SHLDR ARTHROSCOP,SURG,W/ROTAT CUFF REPR Right 6/11/2018    SHOULDER ARTHROSCOPY ROTATOR CUFF REPAIR performed by Jitendra Zhang MD at [facility]    stomach stapling, [facility]    TONSILLECTOMY      3 y.o.    WISDOM TOOTH EXTRACTION         FAMILY HISTORY    Family History   Problem Relation Age of Onset    Diabetes Maternal Grandmother     COPD Mother     Rheum Arthritis Mother     Osteoarthritis Mother     Osteoporosis Mother     Other Mother         AAA    High Blood Pressure Mother     Glaucoma Father     Arthritis Father     Cancer Father         throat cancer twice        SOCIAL HISTORY    Social History   Substance Use Topics    Smoking status: Former Smoker     Quit date: 6/20/2013    Smokeless tobacco: Never Used    Alcohol use 2.4 oz/week     4 Standard drinks or equivalent per week      Comment: occasionally weekends       ALLERGIES    Allergies   Allergen Reactions    Nickel      In foods    Percocet [Oxycodone-Acetaminophen]        MEDICATIONS    Current Outpatient Prescriptions on File Prior to Encounter   Medication Sig Dispense Refill    ondansetron (ZOFRAN) 4 MG tablet Take 1 tablet by mouth daily as needed for Nausea or Vomiting 20 tablet 0    Calcium Citrate-Vitamin D (CALCIUM CITRATE + D PO) Take by mouth daily      MAGNESIUM CHLORIDE PO Take by mouth daily      Thiamine HCl (VITAMIN B-1) 250 MG tablet Take 250 mg by mouth daily      bisoprolol-hydrochlorothiazide (ZIAC) 5-6.25 MG per tablet Take 1 tablet by mouth daily       Potassium 99 MG TABS Take 99 mg by mouth daily       Biotin 5000 MCG CAPS Take 5,000 mcg by mouth daily       Ascorbic Acid (VITAMIN C) 1000 MG tablet Take 1,000 mg by mouth daily      Cholecalciferol (VITAMIN D3) 2000 units CAPS Take by mouth daily       alendronate (FOSAMAX) 70 MG tablet Take 1 tablet by mouth every 7 days 4 tablet 3    VENTOLIN  (90 BASE) rhythm  ABDOMEN: soft, surgical scars, present,  non distended,  no guarding present  MUSCULOSKELETAL:  negative  NEUROLOGIC:  Mental Status Exam:  Level of Alertness:   alert  Orientation:   oriented to person, place, and time  Skin: no rashes, ulcers x2 on abdomen, no surrounding erythema, no odor, wound beds covered with pale yellow fibrin    Objective:      /85   Pulse 75   Temp 98.3 °F (36.8 °C) (Tympanic)   Resp 18   Ht 5' 1\" (1.549 m)   Wt 232 lb (105.2 kg)   LMP 07/01/2010 (Approximate)   BMI 43.84 kg/m²     Wt Readings from Last 3 Encounters:   07/31/18 232 lb (105.2 kg)   07/23/18 232 lb (105.2 kg)   06/26/18 240 lb (108.9 kg)           Assessment:      Patient Active Problem List   Diagnosis Code    HTN (hypertension) I10    Sleep apnea G47.30    GERD (gastroesophageal reflux disease) K21.9    Vision abnormalities H53.9    Hypothyroid E03.9    Depression F32.9    Osteoporosis ( Mother) M81.0    Osteopenia M85.80    Asthma J45.909    S/P partial gastrectomy Z90.3    S/P abdominoplasty Z98.890    Obesity, morbid, BMI 40.0-49.9 ([facility]) E66.01    Rectocele 3 N81.6    Vaginal enterocele 1 N81.5    NORMAN (stress urinary incontinence, female) N39.3    Open abdominal wall wound S31.109A    Chronic pain of right knee M25.561, G89.29    Prediabetes R73.03    Complete tear of right rotator cuff M75.121        Procedure Note  Indications:  Based on my examination of this patient's wound(s)/ulcer(s) today, debridement is required to promote healing and evaluate the wound base. Performed by: KERRIE Kent - CNP    Consent obtained:  Yes    Time out taken:  Yes    Pain Control: Anesthetic  Anesthetic: 4% Lidocaine Liquid Topical       Debridement:Excisional Debridement    Using curette the wound(s)/ulcer(s) was/were sharply debrided down through and including the removal of subcutaneous tissue.         Devitalized Tissue Debrided:  fibrin, biofilm and slough    Pre Debridement

## 2018-08-09 ENCOUNTER — HOSPITAL ENCOUNTER (OUTPATIENT)
Dept: WOUND CARE | Age: 63
Discharge: HOME | End: 2018-08-09
Payer: COMMERCIAL

## 2018-08-09 VITALS
BODY MASS INDEX: 43.8 KG/M2 | HEIGHT: 61 IN | TEMPERATURE: 98.3 F | DIASTOLIC BLOOD PRESSURE: 69 MMHG | HEART RATE: 67 BPM | SYSTOLIC BLOOD PRESSURE: 105 MMHG | WEIGHT: 232 LBS | RESPIRATION RATE: 18 BRPM

## 2018-08-09 DIAGNOSIS — L08.9 CHRONIC WOUND INFECTION OF ABDOMEN, INITIAL ENCOUNTER: Primary | ICD-10-CM

## 2018-08-09 DIAGNOSIS — S31.109A CHRONIC WOUND INFECTION OF ABDOMEN, INITIAL ENCOUNTER: Primary | ICD-10-CM

## 2018-08-09 LAB
ABSOLUTE EOS #: 0.3 K/UL (ref 0–0.4)
ABSOLUTE IMMATURE GRANULOCYTE: ABNORMAL K/UL (ref 0–0.3)
ABSOLUTE LYMPH #: 2.7 K/UL (ref 1–4.8)
ABSOLUTE MONO #: 0.5 K/UL (ref 0.1–1.3)
ANION GAP SERPL CALCULATED.3IONS-SCNC: 13 MMOL/L (ref 9–17)
BASOPHILS # BLD: 0 % (ref 0–2)
BASOPHILS ABSOLUTE: 0 K/UL (ref 0–0.2)
BUN BLDV-MCNC: 14 MG/DL (ref 8–23)
BUN/CREAT BLD: ABNORMAL (ref 9–20)
CALCIUM SERPL-MCNC: 10 MG/DL (ref 8.6–10.4)
CHLORIDE BLD-SCNC: 103 MMOL/L (ref 98–107)
CO2: 26 MMOL/L (ref 20–31)
CREAT SERPL-MCNC: 0.66 MG/DL (ref 0.5–0.9)
DIFFERENTIAL TYPE: ABNORMAL
EOSINOPHILS RELATIVE PERCENT: 5 % (ref 0–4)
GFR AFRICAN AMERICAN: >60 ML/MIN
GFR NON-AFRICAN AMERICAN: >60 ML/MIN
GFR SERPL CREATININE-BSD FRML MDRD: ABNORMAL ML/MIN/{1.73_M2}
GFR SERPL CREATININE-BSD FRML MDRD: ABNORMAL ML/MIN/{1.73_M2}
GLUCOSE BLD-MCNC: 102 MG/DL (ref 70–99)
HCT VFR BLD CALC: 39.3 % (ref 36–46)
HEMOGLOBIN: 12.8 G/DL (ref 12–16)
IMMATURE GRANULOCYTES: ABNORMAL %
LYMPHOCYTES # BLD: 39 % (ref 24–44)
MCH RBC QN AUTO: 28.6 PG (ref 26–34)
MCHC RBC AUTO-ENTMCNC: 32.6 G/DL (ref 31–37)
MCV RBC AUTO: 87.7 FL (ref 80–100)
MONOCYTES # BLD: 8 % (ref 1–7)
NRBC AUTOMATED: ABNORMAL PER 100 WBC
PDW BLD-RTO: 15.5 % (ref 11.5–14.9)
PLATELET # BLD: 284 K/UL (ref 150–450)
PLATELET ESTIMATE: ABNORMAL
PMV BLD AUTO: 8.1 FL (ref 6–12)
POTASSIUM SERPL-SCNC: 4.2 MMOL/L (ref 3.7–5.3)
RBC # BLD: 4.49 M/UL (ref 4–5.2)
RBC # BLD: ABNORMAL 10*6/UL
SEDIMENTATION RATE, ERYTHROCYTE: 12 MM (ref 0–20)
SEG NEUTROPHILS: 48 % (ref 36–66)
SEGMENTED NEUTROPHILS ABSOLUTE COUNT: 3.4 K/UL (ref 1.3–9.1)
SODIUM BLD-SCNC: 142 MMOL/L (ref 135–144)
WBC # BLD: 7 K/UL (ref 3.5–11)
WBC # BLD: ABNORMAL 10*3/UL

## 2018-08-09 PROCEDURE — 6370000000 HC RX 637 (ALT 250 FOR IP): Performed by: SURGERY

## 2018-08-09 PROCEDURE — 36415 COLL VENOUS BLD VENIPUNCTURE: CPT

## 2018-08-09 PROCEDURE — 85651 RBC SED RATE NONAUTOMATED: CPT

## 2018-08-09 PROCEDURE — 11042 DBRDMT SUBQ TIS 1ST 20SQCM/<: CPT

## 2018-08-09 PROCEDURE — 85025 COMPLETE CBC W/AUTO DIFF WBC: CPT

## 2018-08-09 PROCEDURE — 80048 BASIC METABOLIC PNL TOTAL CA: CPT

## 2018-08-09 RX ORDER — LIDOCAINE HYDROCHLORIDE 40 MG/ML
SOLUTION TOPICAL ONCE
Status: COMPLETED | OUTPATIENT
Start: 2018-08-09 | End: 2018-08-09

## 2018-08-09 RX ADMIN — LIDOCAINE HYDROCHLORIDE 10 ML: 40 SOLUTION TOPICAL at 15:12

## 2018-08-09 NOTE — PROGRESS NOTES
[facility]   Progress Note and Procedure Note      [id number] [facility] RECORD NUMBER:  [id number]  AGE: 61 y.o. GENDER: female  : 1955  EPISODE DATE:  2018    Subjective:     Chief Complaint   Patient presents with    Wound Check     abdomen         HISTORY of PRESENT ILLNESS HPI     Renata Ramirez is a 61 y.o. female who presents today for wound/ulcer evaluation.    History of Wound Context: 3 years of draining wound  Wound/Ulcer Pain Timing/Severity: intermittent  Quality of pain: sharp  Severity:  3 / 10   Modifying Factors: Pain worsens with debridement  Associated Signs/Symptoms: drainage and odor    Ulcer Identification:  Ulcer Type: non-healing surgical  Contributing Factors: obesity    Wound: N/A        PAST MEDICAL HISTORY        Diagnosis Date    Asthma     Depression     GERD (gastroesophageal reflux disease)     Glaucoma associated with anomalies of iris     had surgery, no eye drops    High blood triglycerides 2018    191 on     Hypertension     Hypothyroidism     Left elbow fracture     no surgery    Obesity     Open abdominal wall wound 10/7/2015    Osteopenia     Tendonitis     Unspecified sleep apnea     CPAP    Urinary incontinence     Vision abnormalities     glasses    Vitamin B1 deficiency     Vitamin D deficiency        PAST SURGICAL HISTORY    Past Surgical History:   Procedure Laterality Date    ABDOMINOPLASTY      ABSCESS DRAINAGE  9-16-15    abd. wall abscess    BUNIONECTOMY Bilateral     CATARACT REMOVAL Bilateral     CHOLECYSTECTOMY      COLONOSCOPY  2012    negative, repeat in 10 years    ENDOSCOPY, COLON, DIAGNOSTIC      EYE SURGERY      see below other history    GLAUCOMA SURGERY  2007    KNEE ARTHROSCOPY Right 2009    torn meniscus    [place] SHLDR ARTHROSCOP,SURG,W/ROTAT CUFF REPR Right 2018    SHOULDER ARTHROSCOPY ROTATOR CUFF REPAIR performed by Gordon Billy MD at [place] [address] CUFF REPAIR Right 1998    STOMACH SURGERY  1989    stomach stapling, [facility]    TONSILLECTOMY      3 y.o.    WISDOM TOOTH EXTRACTION         FAMILY HISTORY    Family History   Problem Relation Age of Onset    Diabetes Maternal Grandmother     COPD Mother     Rheum Arthritis Mother     Osteoarthritis Mother     Osteoporosis Mother     Other Mother         AAA    High Blood Pressure Mother     Glaucoma Father     Arthritis Father     Cancer Father         throat cancer twice        SOCIAL HISTORY    Social History   Substance Use Topics    Smoking status: Former Smoker     Quit date: 6/20/2013    Smokeless tobacco: Never Used    Alcohol use 2.4 oz/week     4 Standard drinks or equivalent per week      Comment: occasionally weekends       ALLERGIES    Allergies   Allergen Reactions    Nickel      In foods    Percocet [Oxycodone-Acetaminophen]        MEDICATIONS    Current Outpatient Prescriptions on File Prior to Encounter   Medication Sig Dispense Refill    ondansetron (ZOFRAN) 4 MG tablet Take 1 tablet by mouth daily as needed for Nausea or Vomiting 20 tablet 0    Calcium Citrate-Vitamin D (CALCIUM CITRATE + D PO) Take by mouth daily      MAGNESIUM CHLORIDE PO Take by mouth daily      Thiamine HCl (VITAMIN B-1) 250 MG tablet Take 250 mg by mouth daily      bisoprolol-hydrochlorothiazide (ZIAC) 5-6.25 MG per tablet Take 1 tablet by mouth daily       Potassium 99 MG TABS Take 99 mg by mouth daily       Biotin 5000 MCG CAPS Take 5,000 mcg by mouth daily       Ascorbic Acid (VITAMIN C) 1000 MG tablet Take 1,000 mg by mouth daily      Cholecalciferol (VITAMIN D3) 2000 units CAPS Take by mouth daily       alendronate (FOSAMAX) 70 MG tablet Take 1 tablet by mouth every 7 days 4 tablet 3    VENTOLIN  (90 BASE) MCG/ACT inhaler Inhale 2 puffs into the lungs every 6 hours as needed       docusate sodium (COLACE) 100 MG capsule Take 1 capsule by mouth daily as needed for Constipation 20 capsule 0    SYMBICORT 160-4.5 MCG/ACT AERO Inhale 2 puffs into the lungs 2 times daily       SYNTHROID 50 MCG tablet Take 50 mcg by mouth Daily       omeprazole (PRILOSEC) 20 MG capsule Take 20 mg by mouth 2 times daily       Multiple Vitamins-Minerals (OCUVITE PO) Take by mouth daily       FLUoxetine (PROZAC) 20 MG capsule Take 20 mg by mouth daily.  losartan (COZAAR) 50 MG tablet Take 50 mg by mouth 2 times daily.  hydrOXYzine (ATARAX) 25 MG tablet Take 25 mg by mouth daily. No current facility-administered medications on file prior to encounter. REVIEW OF SYSTEMS    Pertinent items are noted in HPI.     Objective:      /69   Pulse 67   Temp 98.3 °F (36.8 °C) (Tympanic)   Resp 18   Ht 5' 1\" (1.549 m)   Wt 232 lb (105.2 kg)   LMP 07/01/2010 (Approximate)   BMI 43.84 kg/m²     Wt Readings from Last 3 Encounters:   08/09/18 232 lb (105.2 kg)   07/31/18 232 lb (105.2 kg)   07/23/18 232 lb (105.2 kg)       PHYSICAL EXAM    General Appearance: alert and oriented to person, place and time, well developed and well- nourished, in no acute distress  Skin: warm and dry, no rash or erythema  Head: normocephalic and atraumatic  Eyes: pupils equal, round, and reactive to light, extraocular eye movements intact, conjunctivae normal  ENT: tympanic membrane, external ear and ear canal normal bilaterally, nose without deformity, nasal mucosa and turbinates normal without polyps  Neck: supple and non-tender without mass, no thyromegaly or thyroid nodules, no cervical lymphadenopathy  Pulmonary/Chest: clear to auscultation bilaterally- no wheezes, rales or rhonchi, normal air movement, no respiratory distress  Cardiovascular: normal rate, regular rhythm, normal S1 and S2, no murmurs, rubs, clicks, or gallops, distal pulses intact, no carotid bruits  Abdomen: soft, non-tender, non-distended, normal bowel sounds, no masses or organomegaly  Extremities: no cyanosis, clubbing or edema  Musculoskeletal: normal range of motion, no joint swelling, deformity or tenderness  Neurologic: reflexes normal and symmetric, no cranial nerve deficit, gait, coordination and speech normal      Assessment:      Patient Active Problem List   Diagnosis Code    HTN (hypertension) I10    Sleep apnea G47.30    GERD (gastroesophageal reflux disease) K21.9    Vision abnormalities H53.9    Hypothyroid E03.9    Depression F32.9    Osteoporosis ( Mother) M81.0    Osteopenia M85.80    Asthma J45.909    S/P partial gastrectomy Z90.3    S/P abdominoplasty Z98.890    Obesity, morbid, BMI 40.0-49.9 ([facility]) E66.01    Rectocele 3 N81.6    Vaginal enterocele 1 N81.5    NORMAN (stress urinary incontinence, female) N39.3    Open abdominal wall wound S31.109A    Chronic pain of right knee M25.561, G89.29    Prediabetes R73.03    Complete tear of right rotator cuff M75.121    Abdominal wall ulcer, with fat layer exposed ([address] L98.492        Procedure Note  Indications:  Based on my examination of this patient's wound(s)/ulcer(s) today, debridement is required to promote healing and evaluate the wound base. Performed by: Sherley Jacinto MD    Consent obtained:  Yes    Time out taken:  Yes    Pain Control: Anesthetic  Anesthetic: 4% Lidocaine Liquid Topical       Debridement:Excisional Debridement    Using curette the wound(s)/ulcer(s) was/were sharply debrided down through and including the removal of dermis and subcutaneous tissue.         Devitalized Tissue Debrided:  fibrin, biofilm and slough    Pre Debridement Measurements:  Are located in the Wound/Ulcer Documentation Flow Sheet    Wound/Ulcer #: 4    Post Debridement Measurements:  Wound/Ulcer Descriptions are Pre Debridement except measurements:    Negative Pressure Wound Therapy Abdomen Medial (Active)   Number of days: 7219       Wound 09/23/15 Wound #1 mid abdominal (umbilicus) Surgical , 1-03-33 abscess (Active)   Number of days: 1051       Wound 07/31/18 Other (Comment) Abdomen Mid;Right #2 right mid abdomen,irritation,surgery 3 yrs (Active)   Wound Image   7/31/2018  9:03 AM   Wound Type Wound 8/9/2018  3:02 PM   Wound Other 8/9/2018  3:02 PM   Dressing Status Old drainage 8/9/2018  3:02 PM   Dressing Changed Changed/New 8/9/2018  3:02 PM   Wound Cleansed Rinsed/Irrigated with saline 8/9/2018  3:02 PM   Wound Length (cm) 0.1 cm 8/9/2018  3:28 PM   Wound Width (cm) 1 cm 8/9/2018  3:28 PM   Wound Depth (cm)  0.1 8/9/2018  3:28 PM   Calculated Wound Size (cm^2) (l*w) 0.1 cm^2 8/9/2018  3:28 PM   Change in Wound Size % (l*w) 82.14 8/9/2018  3:28 PM   Wound Assessment Yellow 8/9/2018  3:02 PM   Drainage Amount Moderate 8/9/2018  3:02 PM   Drainage Description Serosanguinous 8/9/2018  3:02 PM   Odor None 8/9/2018  3:02 PM   Margins Epibole (rolled edges) 7/31/2018  9:03 AM   [place]-wound Assessment Clean 8/9/2018  3:02 PM   Yellow%Wound Bed 100 8/9/2018  3:02 PM   Number of days: 9       Wound 07/31/18 Other (Comment) Abdomen Left;Mid #3 left mid abdomen,irritation,surgery 3 yrs (Active)   Wound Image   7/31/2018  9:03 AM   Wound Type Wound 8/9/2018  3:02 PM   Dressing Status Old drainage 8/9/2018  3:02 PM   Dressing Changed Changed/New 8/9/2018  3:02 PM   Wound Cleansed Rinsed/Irrigated with saline 8/9/2018  3:02 PM   Wound Length (cm) 0.9 cm 8/9/2018  3:28 PM   Wound Width (cm) 1.2 cm 8/9/2018  3:28 PM   Wound Depth (cm)  0.1 8/9/2018  3:28 PM   Calculated Wound Size (cm^2) (l*w) 1.08 cm^2 8/9/2018  3:28 PM   Change in Wound Size % (l*w) 48.57 8/9/2018  3:28 PM   Wound Assessment Red;Yellow 8/9/2018  3:02 PM   Drainage Amount Moderate 8/9/2018  3:02 PM   Drainage Description Serosanguinous 8/9/2018  3:02 PM   Odor None 8/9/2018  3:02 PM   Margins Epibole (rolled edges) 7/31/2018  9:03 AM   [place]-wound Assessment Clean 8/9/2018  3:02 PM   Red%Wound Bed 10 8/9/2018  3:02 PM   Yellow%Wound Bed 90 8/9/2018  3:02 PM   Number of days: 9       Wound 08/09/18 Other EITHER        TIME [x] 45 MIN     [] 60 MIN     (Please note your next appointment above and if you are unable to keep, kindly give a 24 hour notice.  Thank you.)     If you experience any of the following, please call the [facility] during business hours:  [phone]     * Increase in Pain  * Temperature over 101  * Increase in drainage from your wound  * Drainage with a foul odor  * Bleeding  * Increase in swelling  * Need for compression bandage changes due to slippage, breakthrough drainage.     If you need medical attention outside of the business hours of the [facility] please contact your PCP or go to the nearest emergency room.     AVS REVIEWED    YES [x]     Patient Signature:__________________________________________________Date:_______  Electronically signed by Eugenio Geller RN on 8/9/2018 at 3:17 PM  Electronically signed by Meño Barboza MD on 8/9/2018 at 3:41 PM          Electronically signed by Meño Barboza MD on 8/9/2018 at 3:55 PM

## 2018-08-09 NOTE — PROGRESS NOTES
56 [facility]   Progress Note and Procedure Note      [id number] [facility] RECORD NUMBER:  [id number]  AGE: 61 y.o. GENDER: female  : 1955  EPISODE DATE:  2018    Subjective:     Chief Complaint   Patient presents with    Wound Check     abdomen         HISTORY of PRESENT ILLNESS HPI     Allyn Wesley is a 61 y.o. female who presents today for wound/ulcer evaluation.    History of Wound Context: draining wound for 3 years after abdominal hematoma drainage  Wound/Ulcer Pain Timing/Severity: intermittent  Quality of pain: sharp  Severity:  3 / 10   Modifying Factors: Pain worsens with debridement  Associated Signs/Symptoms: drainage, odor and pain    Ulcer Identification:  Ulcer Type: non-healing surgical  Contributing Factors: obesity    Wound: N/A        PAST MEDICAL HISTORY        Diagnosis Date    Asthma     Depression     GERD (gastroesophageal reflux disease)     Glaucoma associated with anomalies of iris     had surgery, no eye drops    High blood triglycerides 2018    191 on     Hypertension     Hypothyroidism     Left elbow fracture     no surgery    Obesity     Open abdominal wall wound 10/7/2015    Osteopenia     Tendonitis     Unspecified sleep apnea     CPAP    Urinary incontinence     Vision abnormalities     glasses    Vitamin B1 deficiency     Vitamin D deficiency        PAST SURGICAL HISTORY    Past Surgical History:   Procedure Laterality Date    ABDOMINOPLASTY      ABSCESS DRAINAGE  9-16-15    abd. wall abscess    BUNIONECTOMY Bilateral     CATARACT REMOVAL Bilateral     CHOLECYSTECTOMY      COLONOSCOPY  2012    negative, repeat in 10 years    ENDOSCOPY, COLON, DIAGNOSTIC      EYE SURGERY      see below other history    GLAUCOMA SURGERY  2007    KNEE ARTHROSCOPY Right 2009    torn meniscus    [place] SHLDR ARTHROSCOP,SURG,W/ROTAT CUFF REPR Right 2018    SHOULDER ARTHROSCOPY ROTATOR CUFF REPAIR performed by Jerri [facility] at [facility]    stomach stapling, [facility]    TONSILLECTOMY      3 y.o.    WISDOM TOOTH EXTRACTION         FAMILY HISTORY    Family History   Problem Relation Age of Onset    Diabetes Maternal Grandmother     COPD Mother     Rheum Arthritis Mother     Osteoarthritis Mother     Osteoporosis Mother     Other Mother         AAA    High Blood Pressure Mother     Glaucoma Father     Arthritis Father     Cancer Father         throat cancer twice        SOCIAL HISTORY    Social History   Substance Use Topics    Smoking status: Former Smoker     Quit date: 6/20/2013    Smokeless tobacco: Never Used    Alcohol use 2.4 oz/week     4 Standard drinks or equivalent per week      Comment: occasionally weekends       ALLERGIES    Allergies   Allergen Reactions    Nickel      In foods    Percocet [Oxycodone-Acetaminophen]        MEDICATIONS    Current Outpatient Prescriptions on File Prior to Encounter   Medication Sig Dispense Refill    ondansetron (ZOFRAN) 4 MG tablet Take 1 tablet by mouth daily as needed for Nausea or Vomiting 20 tablet 0    Calcium Citrate-Vitamin D (CALCIUM CITRATE + D PO) Take by mouth daily      MAGNESIUM CHLORIDE PO Take by mouth daily      Thiamine HCl (VITAMIN B-1) 250 MG tablet Take 250 mg by mouth daily      bisoprolol-hydrochlorothiazide (ZIAC) 5-6.25 MG per tablet Take 1 tablet by mouth daily       Potassium 99 MG TABS Take 99 mg by mouth daily       Biotin 5000 MCG CAPS Take 5,000 mcg by mouth daily       Ascorbic Acid (VITAMIN C) 1000 MG tablet Take 1,000 mg by mouth daily      Cholecalciferol (VITAMIN D3) 2000 units CAPS Take by mouth daily       alendronate (FOSAMAX) 70 MG tablet Take 1 tablet by mouth every 7 days 4 tablet 3    VENTOLIN  (90 BASE) MCG/ACT inhaler Inhale 2 puffs into the lungs every 6 hours as needed       docusate sodium (COLACE) 100 MG capsule Take 1 capsule by no cyanosis, clubbing or edema  Musculoskeletal: normal range of motion, no joint swelling, deformity or tenderness  Neurologic: reflexes normal and symmetric, no cranial nerve deficit, gait, coordination and speech normal      Assessment:      Patient Active Problem List   Diagnosis Code    HTN (hypertension) I10    Sleep apnea G47.30    GERD (gastroesophageal reflux disease) K21.9    Vision abnormalities H53.9    Hypothyroid E03.9    Depression F32.9    Osteoporosis ( Mother) M81.0    Osteopenia M85.80    Asthma J45.909    S/P partial gastrectomy Z90.3    S/P abdominoplasty Z98.890    Obesity, morbid, BMI 40.0-49.9 ([facility]) E66.01    Rectocele 3 N81.6    Vaginal enterocele 1 N81.5    NORMAN (stress urinary incontinence, female) N39.3    Open abdominal wall wound S31.109A    Chronic pain of right knee M25.561, G89.29    Prediabetes R73.03    Complete tear of right rotator cuff M75.121    Abdominal wall ulcer, with fat layer exposed ([address] L98.492        Procedure Note  Indications:  Based on my examination of this patient's wound(s)/ulcer(s) today, debridement is required to promote healing and evaluate the wound base. Performed by: Marielos Ochoa MD    Consent obtained:  Yes    Time out taken:  Yes    Pain Control: Anesthetic  Anesthetic: 4% Lidocaine Liquid Topical       Debridement:Excisional Debridement    Using curette the wound(s)/ulcer(s) was/were sharply debrided down through and including the removal of subcutaneous tissue.         Devitalized Tissue Debrided:  fibrin, biofilm and slough    Pre Debridement Measurements:  Are located in the Wound/Ulcer Documentation Flow Sheet    Wound/Ulcer #: 2 and 3    Post Debridement Measurements:  Wound/Ulcer Descriptions are Pre Debridement except measurements:    Negative Pressure Wound Therapy Abdomen Medial (Active)   Number of days: 7862       Wound 09/23/15 Wound #1 mid abdominal (umbilicus) Surgical , 4-23-38 abscess (Active)   Number of days: 8034 09/16/15 Abdomen Medial (Active)   Number of days: 1058       Incision 06/11/18 Shoulder Right (Active)   Number of days: 59       Percent of Wound(s)/Ulcer(s) Debrided: 100%    Total Surface Area Debrided:  1.18 sq cm       Diabetic/Pressure/Non Pressure Ulcers only:  Ulcer: Non-Pressure ulcer, fat layer exposed      Estimated Blood Loss:  Minimal    Hemostasis Achieved:  by pressure    Procedural Pain:  3  / 10     Post Procedural Pain:  3 / 10     Response to treatment:  With complaints of pain. Plan:     Treatment Note please see attached Discharge Instructions    Written patient dismissal instructions given to patient and signed by patient or POA. Discharge Instructions                                                                                                                                                                                                                                                                                                                                                                     [id number]                                 Visit  Discharge Instructions / Physician Orders  DATE:  08/9/18/18     Home Care: [facility]     SUPPLIES ORDERED THRU:  above     Wound Location:  Left and right abdomen and umbilicus     Cleanse with:  Liquid antibacterial soap and water, rinse well                   Dressing Orders: Eunice to left and right and upper left quadrant abdomen wounds, cover with dry gauze and secure with mefix tape  Iodoform 2/4\" into umbilical wound with dry gauze and mefix tape.     Frequency:  Daily                    Additional Orders: Increase protein to diet (meat, cheese, eggs, fish, peanut butter, nuts and beans)  Multivitamin daily     HYPERBARIC TREATMENT-                TREATMENT #     Your next appointment with [address] [place] [address] is next Wednesday with Dr. Darryle Sailor per Dr. Lilia Coreas   [] CHAIR     [x] BED   [] EITHER        TIME [x] 45 MIN     [] 60 MIN     (Please note your next appointment above and if you are unable to keep, kindly give a 24 hour notice.  Thank you.)     If you experience any of the following, please call the [facility] [facility] during business hours:  [phone]     * Increase in Pain  * Temperature over 101  * Increase in drainage from your wound  * Drainage with a foul odor  * Bleeding  * Increase in swelling  * Need for compression bandage changes due to slippage, breakthrough drainage.     If you need medical attention outside of the business hours of the [facility] please contact your PCP or go to the nearest emergency room.     AVS REVIEWED    YES [x]     Patient Signature:__________________________________________________Date:_______  Electronically signed by Marli Monique RN on 8/9/2018 at 3:17 PM          Electronically signed by Ximena Hernadez MD on 8/9/2018 at 3:37 PM

## 2018-08-15 ENCOUNTER — HOSPITAL ENCOUNTER (OUTPATIENT)
Dept: WOUND CARE | Age: 63
Discharge: HOME | End: 2018-08-15
Payer: COMMERCIAL

## 2018-08-15 ENCOUNTER — HOSPITAL ENCOUNTER (OUTPATIENT)
Dept: CT IMAGING | Age: 63
Discharge: HOME | End: 2018-08-17
Payer: COMMERCIAL

## 2018-08-15 VITALS
RESPIRATION RATE: 18 BRPM | HEIGHT: 61 IN | HEART RATE: 74 BPM | TEMPERATURE: 98.2 F | BODY MASS INDEX: 43.8 KG/M2 | SYSTOLIC BLOOD PRESSURE: 107 MMHG | DIASTOLIC BLOOD PRESSURE: 63 MMHG | WEIGHT: 232 LBS

## 2018-08-15 DIAGNOSIS — S31.109D OPEN WOUND OF ABDOMINAL WALL, SUBSEQUENT ENCOUNTER: Primary | Chronic | ICD-10-CM

## 2018-08-15 DIAGNOSIS — S31.109A CHRONIC WOUND INFECTION OF ABDOMEN, INITIAL ENCOUNTER: ICD-10-CM

## 2018-08-15 DIAGNOSIS — L08.9 CHRONIC WOUND INFECTION OF ABDOMEN, INITIAL ENCOUNTER: ICD-10-CM

## 2018-08-15 LAB
ALBUMIN SERPL-MCNC: 4.2 G/DL (ref 3.5–5.2)
PREALBUMIN: 19.6 MG/DL (ref 20–40)
TOTAL PROTEIN: 6.8 G/DL (ref 6.4–8.3)

## 2018-08-15 PROCEDURE — 87070 CULTURE OTHR SPECIMN AEROBIC: CPT

## 2018-08-15 PROCEDURE — 74177 CT ABD & PELVIS W/CONTRAST: CPT

## 2018-08-15 PROCEDURE — 2580000003 HC RX 258: Performed by: SURGERY

## 2018-08-15 PROCEDURE — 87176 TISSUE HOMOGENIZATION CULTR: CPT

## 2018-08-15 PROCEDURE — 87075 CULTR BACTERIA EXCEPT BLOOD: CPT

## 2018-08-15 PROCEDURE — 86403 PARTICLE AGGLUT ANTBDY SCRN: CPT

## 2018-08-15 PROCEDURE — 84155 ASSAY OF PROTEIN SERUM: CPT

## 2018-08-15 PROCEDURE — 87205 SMEAR GRAM STAIN: CPT

## 2018-08-15 PROCEDURE — 84134 ASSAY OF PREALBUMIN: CPT

## 2018-08-15 PROCEDURE — 87076 CULTURE ANAEROBE IDENT EACH: CPT

## 2018-08-15 PROCEDURE — 36415 COLL VENOUS BLD VENIPUNCTURE: CPT

## 2018-08-15 PROCEDURE — 6370000000 HC RX 637 (ALT 250 FOR IP): Performed by: SURGERY

## 2018-08-15 PROCEDURE — 87185 SC STD ENZYME DETCJ PER NZM: CPT

## 2018-08-15 PROCEDURE — 82040 ASSAY OF SERUM ALBUMIN: CPT

## 2018-08-15 PROCEDURE — 11042 DBRDMT SUBQ TIS 1ST 20SQCM/<: CPT | Performed by: SURGERY

## 2018-08-15 PROCEDURE — 6360000004 HC RX CONTRAST MEDICATION: Performed by: SURGERY

## 2018-08-15 RX ORDER — 0.9 % SODIUM CHLORIDE 0.9 %
80 INTRAVENOUS SOLUTION INTRAVENOUS ONCE
Status: COMPLETED | OUTPATIENT
Start: 2018-08-15 | End: 2018-08-15

## 2018-08-15 RX ORDER — LIDOCAINE HYDROCHLORIDE 40 MG/ML
SOLUTION TOPICAL ONCE
Status: COMPLETED | OUTPATIENT
Start: 2018-08-15 | End: 2018-08-15

## 2018-08-15 RX ORDER — SODIUM CHLORIDE 0.9 % (FLUSH) 0.9 %
10 SYRINGE (ML) INJECTION PRN
Status: DISCONTINUED | OUTPATIENT
Start: 2018-08-15 | End: 2018-08-18 | Stop reason: HOSPADM

## 2018-08-15 RX ADMIN — SODIUM CHLORIDE 80 ML: 9 INJECTION, SOLUTION INTRAVENOUS at 09:05

## 2018-08-15 RX ADMIN — IOPAMIDOL 75 ML: 755 INJECTION, SOLUTION INTRAVENOUS at 09:05

## 2018-08-15 RX ADMIN — Medication 10 ML: at 09:05

## 2018-08-15 RX ADMIN — LIDOCAINE HYDROCHLORIDE 5 ML: 40 SOLUTION TOPICAL at 10:59

## 2018-08-15 RX ADMIN — IOHEXOL 50 ML: 240 INJECTION, SOLUTION INTRATHECAL; INTRAVASCULAR; INTRAVENOUS; ORAL at 09:05

## 2018-08-15 ASSESSMENT — PAIN SCALES - GENERAL: PAINLEVEL_OUTOF10: 0

## 2018-08-15 NOTE — PROGRESS NOTES
AM   Number of days: 15       Wound 08/09/18 Other (Comment) Umbilicus Other (Comment) Wound # 4 Umbilicus,new today. (Active)   Wound Image   8/9/2018  3:47 PM   Wound Type Wound 8/15/2018 10:56 AM   Wound Other 8/15/2018 10:56 AM   Dressing Status Old drainage 8/15/2018 10:56 AM   Dressing Changed Changed/New 8/15/2018 10:56 AM   Wound Cleansed Rinsed/Irrigated with saline 8/15/2018 10:56 AM   Wound Length (cm) 0.5 cm 8/15/2018 11:08 AM   Wound Width (cm) 0.5 cm 8/15/2018 11:08 AM   Wound Depth (cm)  1.5 8/15/2018 11:08 AM   Calculated Wound Size (cm^2) (l*w) 0.25 cm^2 8/15/2018 11:08 AM   Change in Wound Size % (l*w) -25 8/15/2018 11:08 AM   Distance Tunneling (cm) 4.5 cm 8/15/2018 11:08 AM   Tunneling Position ___ O'Clock 1 8/15/2018 11:08 AM   Wound Assessment Drainage; Red 8/15/2018 10:56 AM   Drainage Amount Moderate 8/15/2018 10:56 AM   Drainage Description Serosanguinous 8/15/2018 10:56 AM   Odor None 8/15/2018 10:56 AM   Margins Defined edges 8/15/2018 10:56 AM   [place]-wound Assessment Clean;Dry; Intact 8/15/2018 10:56 AM   Red%Wound Bed 100 8/15/2018 10:56 AM   Number of days: 5       Incision 09/16/15 Abdomen Medial (Active)   Number of days: 1064       Incision 06/11/18 Shoulder Right (Active)   Number of days: 64       Percent of Wound(s)/Ulcer(s) Debrided: 100%    Total Surface Area Debrided:  0.25 sq cm       Diabetic/Pressure/Non Pressure Ulcers only:  Ulcer: N/A      Estimated Blood Loss:  Minimal    Hemostasis Achieved:  by pressure    Procedural Pain:  2  / 10     Post Procedural Pain:  2 / 10     Response to treatment:  Well tolerated by patient. Plan:     Treatment Note please see attached Discharge Instructions    Written patient dismissal instructions given to patient and signed by patient or POA.          Discharge Instructions

## 2018-08-21 LAB
CULTURE: ABNORMAL
DIRECT EXAM: ABNORMAL
DIRECT EXAM: ABNORMAL
Lab: ABNORMAL
SPECIMEN DESCRIPTION: ABNORMAL
STATUS: ABNORMAL

## 2018-08-29 ENCOUNTER — HOSPITAL ENCOUNTER (OUTPATIENT)
Dept: WOUND CARE | Age: 63
Discharge: HOME | End: 2018-08-29
Payer: COMMERCIAL

## 2018-08-29 VITALS
TEMPERATURE: 97.5 F | HEART RATE: 71 BPM | HEIGHT: 61 IN | RESPIRATION RATE: 18 BRPM | BODY MASS INDEX: 43.8 KG/M2 | WEIGHT: 232 LBS | SYSTOLIC BLOOD PRESSURE: 117 MMHG | DIASTOLIC BLOOD PRESSURE: 73 MMHG

## 2018-08-29 PROCEDURE — 11042 DBRDMT SUBQ TIS 1ST 20SQCM/<: CPT

## 2018-08-29 RX ORDER — LIDOCAINE HYDROCHLORIDE 40 MG/ML
SOLUTION TOPICAL ONCE
Status: DISCONTINUED | OUTPATIENT
Start: 2018-08-29 | End: 2018-08-30 | Stop reason: HOSPADM

## 2018-08-29 ASSESSMENT — PAIN SCALES - GENERAL: PAINLEVEL_OUTOF10: 0

## 2018-08-29 NOTE — PROGRESS NOTES
Jane Castillo 37   Progress Note and Procedure Note      [id number] [facility] RECORD NUMBER:  [id number]  AGE: 61 y.o. GENDER: female  : 1955  EPISODE DATE:  2018    Subjective:     Chief Complaint   Patient presents with    Wound Check     abdominal          HISTORY of PRESENT ILLNESS HPI     Javier Bonilla is a 61 y.o. female who presents today for wound/ulcer evaluation. History of Wound Context: abdominal wall wound at site of previous hematoma and abscess  Wound/Ulcer Pain Timing/Severity: intermittent  Quality of pain: sharp  Severity:  3 / 10   Modifying Factors: Pain worsens with debridement  Associated Signs/Symptoms: none    Drainage and odor have resolved.     Ulcer Identification:  Ulcer Type: non-healing/non-surgical  Contributing Factors: diabetes and obesity    Wound: N/A        PAST MEDICAL HISTORY        Diagnosis Date    Asthma     Depression     GERD (gastroesophageal reflux disease)     Glaucoma associated with anomalies of iris     had surgery, no eye drops    High blood triglycerides 2018    191 on     Hypertension     Hypothyroidism     Left elbow fracture     no surgery    Obesity     Open abdominal wall wound 10/7/2015    Osteopenia     Tendonitis     Unspecified sleep apnea     CPAP    Urinary incontinence     Vision abnormalities     glasses    Vitamin B1 deficiency     Vitamin D deficiency        PAST SURGICAL HISTORY    Past Surgical History:   Procedure Laterality Date    ABDOMINOPLASTY      ABSCESS DRAINAGE  9-16-15    abd. wall abscess    BUNIONECTOMY Bilateral     CATARACT REMOVAL Bilateral     CHOLECYSTECTOMY      COLONOSCOPY  2012    negative, repeat in 10 years    ENDOSCOPY, COLON, DIAGNOSTIC      EYE SURGERY      see below other history    GLAUCOMA SURGERY  2007    KNEE ARTHROSCOPY Right 2009    torn meniscus    ID SHLDR ARTHROSCOP,SURG,W/ROTAT CUFF REPR Right 2018    SHOULDER ARTHROSCOPY ROTATOR CUFF REPAIR performed by Norbert Vásquez MD at [facility]    stomach stapling, [facility]    TONSILLECTOMY      3 y.o.    WISDOM TOOTH EXTRACTION         FAMILY HISTORY    Family History   Problem Relation Age of Onset    Diabetes Maternal Grandmother     COPD Mother     Rheum Arthritis Mother     Osteoarthritis Mother     Osteoporosis Mother     Other Mother         AAA    High Blood Pressure Mother     Glaucoma Father     Arthritis Father     Cancer Father         throat cancer twice        SOCIAL HISTORY    Social History   Substance Use Topics    Smoking status: Former Smoker     Quit date: 6/20/2013    Smokeless tobacco: Never Used    Alcohol use 2.4 oz/week     4 Standard drinks or equivalent per week      Comment: occasionally weekends       ALLERGIES    Allergies   Allergen Reactions    Nickel      In foods    Percocet [Oxycodone-Acetaminophen]        MEDICATIONS    Current Outpatient Prescriptions on File Prior to Encounter   Medication Sig Dispense Refill    ondansetron (ZOFRAN) 4 MG tablet Take 1 tablet by mouth daily as needed for Nausea or Vomiting 20 tablet 0    Calcium Citrate-Vitamin D (CALCIUM CITRATE + D PO) Take by mouth daily      MAGNESIUM CHLORIDE PO Take by mouth daily      Thiamine HCl (VITAMIN B-1) 250 MG tablet Take 250 mg by mouth daily      bisoprolol-hydrochlorothiazide (ZIAC) 5-6.25 MG per tablet Take 1 tablet by mouth daily       Potassium 99 MG TABS Take 99 mg by mouth daily       Biotin 5000 MCG CAPS Take 5,000 mcg by mouth daily       Ascorbic Acid (VITAMIN C) 1000 MG tablet Take 1,000 mg by mouth daily      Cholecalciferol (VITAMIN D3) 2000 units CAPS Take by mouth daily       alendronate (FOSAMAX) 70 MG tablet Take 1 tablet by mouth every 7 days 4 tablet 3    VENTOLIN  (90 BASE) MCG/ACT inhaler Inhale 2 puffs into the lungs every 6 hours as needed       docusate sodium (COLACE) Odor None 8/15/2018 10:56 AM   Margins Defined edges 8/15/2018 10:56 AM   [place]-wound Assessment Clean;Dry; Intact 8/29/2018  8:41 AM   Red%Wound Bed 100 8/29/2018  8:41 AM   Number of days: 19       Incision 09/16/15 Abdomen Medial (Active)   Number of days: 1078       Incision 06/11/18 Shoulder Right (Active)   Number of days: 78       Percent of Wound(s)/Ulcer(s) Debrided: 100%    Total Surface Area Debrided:  0.09 sq cm       Diabetic/Pressure/Non Pressure Ulcers only:  Ulcer: N/A      Estimated Blood Loss:  Minimal    Hemostasis Achieved:  by pressure    Procedural Pain:  3  / 10     Post Procedural Pain:  0 / 10     Response to treatment:  Well tolerated by patient. Plan:     Treatment Note please see attached Discharge Instructions    Written patient dismissal instructions given to patient and signed by patient or POA. Discharge Instructions                                                                                                                                                                                                        [facility]PERBAR[facility]  CENTER                                 Visit Carmen Instructions / Physician Orders  DATE:  08/15/18/18     Home Care:      SUPPLIES ORDERED THRU:      Wound Location:  Left and right abdomen and umbilicus     Cleanse with:  Liquid antibacterial soap and water, rinse well     Dressing Orders: Tuck 1/4\" iodoform into wound, cover with dry gauze and secure with mefix tape     Frequency:  Daily     Additional Orders: Increase protein to diet (meat, cheese, eggs, fish, peanut butter, nuts and beans)  Multivitamin daily      HYPERBARIC TREATMENT-                TREATMENT #     Your next appointment with [address] [place] [address] is in two weeks Wednesday with Dr. Ally Smith due patient obligation     ROOM TYPE   [] CHAIR     [x] BED   [] EITHER        TIME [x] 45 MIN     [] 17 MIN     (Please note your next appointment above and if

## 2018-09-10 ENCOUNTER — OFFICE VISIT (OUTPATIENT)
Dept: ORTHOPEDIC SURGERY | Age: 63
End: 2018-09-10

## 2018-09-10 VITALS — BODY MASS INDEX: 39.93 KG/M2 | HEIGHT: 62 IN | WEIGHT: 217 LBS

## 2018-09-10 DIAGNOSIS — Z98.890 S/P ROTATOR CUFF REPAIR: Primary | ICD-10-CM

## 2018-09-10 PROCEDURE — 99024 POSTOP FOLLOW-UP VISIT: CPT | Performed by: ORTHOPAEDIC SURGERY

## 2018-09-12 ENCOUNTER — HOSPITAL ENCOUNTER (OUTPATIENT)
Dept: WOUND CARE | Age: 63
Discharge: HOME | End: 2018-09-12
Payer: COMMERCIAL

## 2018-09-12 VITALS
SYSTOLIC BLOOD PRESSURE: 116 MMHG | RESPIRATION RATE: 18 BRPM | HEART RATE: 68 BPM | TEMPERATURE: 97.4 F | DIASTOLIC BLOOD PRESSURE: 78 MMHG | WEIGHT: 217 LBS | BODY MASS INDEX: 39.69 KG/M2

## 2018-09-12 PROCEDURE — 6370000000 HC RX 637 (ALT 250 FOR IP): Performed by: SURGERY

## 2018-09-12 PROCEDURE — 11042 DBRDMT SUBQ TIS 1ST 20SQCM/<: CPT

## 2018-09-12 RX ORDER — LIDOCAINE HYDROCHLORIDE 40 MG/ML
SOLUTION TOPICAL ONCE
Status: COMPLETED | OUTPATIENT
Start: 2018-09-12 | End: 2018-09-12

## 2018-09-12 RX ADMIN — LIDOCAINE HYDROCHLORIDE 5 ML: 40 SOLUTION TOPICAL at 08:42

## 2018-09-12 ASSESSMENT — PAIN SCALES - GENERAL: PAINLEVEL_OUTOF10: 0

## 2018-09-12 NOTE — PROGRESS NOTES
Jane Castillo 37   Progress Note and Procedure Note      [address] [facility] RECORD NUMBER:  [id number]  AGE: 61 y.o. GENDER: female  : 1955  EPISODE DATE:  2018    Subjective:     Chief Complaint   Patient presents with    Wound Check     abdomen         HISTORY of PRESENT ILLNESS HPI     Pancho Marie is a 61 y.o. female who presents today for wound/ulcer evaluation.    History of Wound Context: umbilical wound following abdominal wall abscess  Wound/Ulcer Pain Timing/Severity: none  Quality of pain: N/A  Severity:  0 / 10   Modifying Factors: None  Associated Signs/Symptoms: drainage     Pt states that she has had minimal to no drainage, no odor    Ulcer Identification:  Ulcer Type: non-healing/non-surgical  Contributing Factors: obesity    Wound: N/A        PAST MEDICAL HISTORY        Diagnosis Date    Asthma     Depression     GERD (gastroesophageal reflux disease)     Glaucoma associated with anomalies of iris     had surgery, no eye drops    High blood triglycerides 2018    191 on     Hypertension     Hypothyroidism     Left elbow fracture     no surgery    Obesity     Open abdominal wall wound 10/7/2015    Osteopenia     Tendonitis     Unspecified sleep apnea     CPAP    Urinary incontinence     Vision abnormalities     glasses    Vitamin B1 deficiency     Vitamin D deficiency        PAST SURGICAL HISTORY    Past Surgical History:   Procedure Laterality Date    ABDOMINOPLASTY      ABSCESS DRAINAGE  9-16-15    abd. wall abscess    BUNIONECTOMY Bilateral     CATARACT REMOVAL Bilateral     CHOLECYSTECTOMY      COLONOSCOPY  2012    negative, repeat in 10 years    ENDOSCOPY, COLON, DIAGNOSTIC      EYE SURGERY      see below other history    GLAUCOMA SURGERY  2007    KNEE ARTHROSCOPY Right 2009    torn meniscus    AK SHLDR ARTHROSCOP,SURG,W/ROTAT CUFF REPR Right 2018    SHOULDER ARTHROSCOPY ROTATOR CUFF REPAIR performed by Aliya Berman MD at [address] [facility]    stomach stapling, [facility]    TONSILLECTOMY      3 y.o.    WISDOM TOOTH EXTRACTION         FAMILY HISTORY    Family History   Problem Relation Age of Onset    Diabetes Maternal Grandmother     COPD Mother     Rheum Arthritis Mother     Osteoarthritis Mother     Osteoporosis Mother     Other Mother         AAA    High Blood Pressure Mother     Glaucoma Father     Arthritis Father     Cancer Father         throat cancer twice        SOCIAL HISTORY    Social History   Substance Use Topics    Smoking status: Former Smoker     Quit date: 6/20/2013    Smokeless tobacco: Never Used    Alcohol use 2.4 oz/week     4 Standard drinks or equivalent per week      Comment: occasionally weekends       ALLERGIES    Allergies   Allergen Reactions    Nickel      In foods    Percocet [Oxycodone-Acetaminophen]        MEDICATIONS    Current Outpatient Prescriptions on File Prior to Encounter   Medication Sig Dispense Refill    ondansetron (ZOFRAN) 4 MG tablet Take 1 tablet by mouth daily as needed for Nausea or Vomiting 20 tablet 0    Calcium Citrate-Vitamin D (CALCIUM CITRATE + D PO) Take by mouth daily      MAGNESIUM CHLORIDE PO Take by mouth daily      Thiamine HCl (VITAMIN B-1) 250 MG tablet Take 250 mg by mouth daily      bisoprolol-hydrochlorothiazide (ZIAC) 5-6.25 MG per tablet Take 1 tablet by mouth daily       Potassium 99 MG TABS Take 99 mg by mouth daily       Biotin 5000 MCG CAPS Take 5,000 mcg by mouth daily       Ascorbic Acid (VITAMIN C) 1000 MG tablet Take 1,000 mg by mouth daily      Cholecalciferol (VITAMIN D3) 2000 units CAPS Take by mouth daily       alendronate (FOSAMAX) 70 MG tablet Take 1 tablet by mouth every 7 days 4 tablet 3    VENTOLIN  (90 BASE) MCG/ACT inhaler Inhale 2 puffs into the lungs every 6 hours as needed       docusate sodium (COLACE) 100 MG capsule Take 1 capsule by

## 2018-09-26 ENCOUNTER — HOSPITAL ENCOUNTER (OUTPATIENT)
Dept: WOUND CARE | Age: 63
Discharge: HOME | End: 2018-09-26
Payer: COMMERCIAL

## 2018-09-26 VITALS
BODY MASS INDEX: 39.93 KG/M2 | HEART RATE: 64 BPM | SYSTOLIC BLOOD PRESSURE: 130 MMHG | RESPIRATION RATE: 18 BRPM | TEMPERATURE: 96.1 F | HEIGHT: 62 IN | WEIGHT: 217 LBS | DIASTOLIC BLOOD PRESSURE: 82 MMHG

## 2018-09-26 PROCEDURE — 87075 CULTR BACTERIA EXCEPT BLOOD: CPT

## 2018-09-26 PROCEDURE — 87070 CULTURE OTHR SPECIMN AEROBIC: CPT

## 2018-09-26 PROCEDURE — 87205 SMEAR GRAM STAIN: CPT

## 2018-09-26 PROCEDURE — 86403 PARTICLE AGGLUT ANTBDY SCRN: CPT

## 2018-09-26 PROCEDURE — 11042 DBRDMT SUBQ TIS 1ST 20SQCM/<: CPT

## 2018-09-26 PROCEDURE — 87186 SC STD MICRODIL/AGAR DIL: CPT

## 2018-09-26 PROCEDURE — 6370000000 HC RX 637 (ALT 250 FOR IP): Performed by: SURGERY

## 2018-09-26 RX ORDER — LIDOCAINE HYDROCHLORIDE 40 MG/ML
SOLUTION TOPICAL ONCE
Status: COMPLETED | OUTPATIENT
Start: 2018-09-26 | End: 2018-09-26

## 2018-09-26 RX ADMIN — LIDOCAINE HYDROCHLORIDE 10 ML: 40 SOLUTION TOPICAL at 09:21

## 2018-09-26 ASSESSMENT — PAIN SCALES - GENERAL: PAINLEVEL_OUTOF10: 0

## 2018-09-26 NOTE — PROGRESS NOTES
Jane Castillo 37   Progress Note and Procedure Note      [id number] [facility] RECORD NUMBER:  [id number]  AGE: 61 y.o. GENDER: female  : 1955  EPISODE DATE:  2018    Subjective:     Chief Complaint   Patient presents with    Wound Check     abdomen         HISTORY of PRESENT ILLNESS HPI     Jeana Dancer is a 61 y.o. female who presents today for wound/ulcer evaluation.    History of Wound Context: abdominal wall wound with chronic draining sinus  Wound/Ulcer Pain Timing/Severity: intermittent  Quality of pain: sharp  Severity:  3 / 10   Modifying Factors: Pain worsens with debridement  Associated Signs/Symptoms: drainage    Ulcer Identification:  Ulcer Type: non-healing/non-surgical  Contributing Factors: diabetes and obesity    Wound: N/A        PAST MEDICAL HISTORY        Diagnosis Date    Asthma     Depression     GERD (gastroesophageal reflux disease)     Glaucoma associated with anomalies of iris     had surgery, no eye drops    High blood triglycerides 2018    191 on     Hypertension     Hypothyroidism     Left elbow fracture     no surgery    Obesity     Open abdominal wall wound 10/7/2015    Osteopenia     Tendonitis     Unspecified sleep apnea     CPAP    Urinary incontinence     Vision abnormalities     glasses    Vitamin B1 deficiency     Vitamin D deficiency        PAST SURGICAL HISTORY    Past Surgical History:   Procedure Laterality Date    ABDOMINOPLASTY      ABSCESS DRAINAGE  9-16-15    abd. wall abscess    BUNIONECTOMY Bilateral     CATARACT REMOVAL Bilateral     CHOLECYSTECTOMY      COLONOSCOPY  2012    negative, repeat in 10 years    ENDOSCOPY, COLON, DIAGNOSTIC      EYE SURGERY      see below other history    GLAUCOMA SURGERY  2007    KNEE ARTHROSCOPY Right 2009    torn meniscus    DE SHLDR ARTHROSCOP,SURG,W/ROTAT CUFF REPR Right 2018    SHOULDER ARTHROSCOPY ROTATOR CUFF REPAIR performed by Machelle Ponce MD Note  Indications:  Based on my examination of this patient's wound(s)/ulcer(s) today, debridement is required to promote healing and evaluate the wound base. Performed by: Alaina Moreno DO    Consent obtained:  Yes    Time out taken:  Yes    Pain Control: Anesthetic  Anesthetic: 4% Lidocaine Liquid Topical       Debridement:Excisional Debridement    Using curette the wound(s)/ulcer(s) was/were sharply debrided down through and including the removal of subcutaneous tissue. Devitalized Tissue Debrided:  fibrin, biofilm and slough    Pre Debridement Measurements:  Are located in the Wound/Ulcer Documentation Flow Sheet    Wound/Ulcer #: 1    Post Debridement Measurements:  Wound/Ulcer Descriptions are Pre Debridement except measurements:    Negative Pressure Wound Therapy Abdomen Medial (Active)   Number of days: 1106       Wound 09/23/15 Wound #1 mid abdominal (umbilicus) Surgical , 2-29-18 abscess (Active)   Number of days: 5579       Wound 08/09/18 Other (Comment) Umbilicus Other (Comment) Wound # 4 Umbilicus,new today. (Active)   Wound Type Wound 9/26/2018  8:43 AM   Wound Other 8/15/2018 10:56 AM   Dressing Status Other (Comment) 9/26/2018  8:43 AM   Dressing Changed Changed/New 9/26/2018  8:43 AM   Wound Cleansed Rinsed/Irrigated with saline 9/26/2018  8:43 AM   Wound Length (cm) 0.5 cm 9/26/2018  9:23 AM   Wound Width (cm) 0.5 cm 9/26/2018  9:23 AM   Wound Depth (cm)  4.0 9/26/2018  9:23 AM   Calculated Wound Size (cm^2) (l*w) 0.25 cm^2 9/26/2018  9:23 AM   Change in Wound Size % (l*w) -25 9/26/2018  9:23 AM   Distance Tunneling (cm) 4.3 cm 8/29/2018  9:06 AM   Tunneling Position ___ O'Clock 1 8/29/2018  9:06 AM   Wound Assessment Red 9/26/2018  8:43 AM   Drainage Amount PENNIE 9/26/2018  8:43 AM   Drainage Description PENNIE 9/26/2018  8:43 AM   Odor None 9/26/2018  8:43 AM   Margins Defined edges 9/26/2018  8:43 AM   [place]-wound Assessment Clean;Dry; Intact 9/26/2018  8:43 AM   Red%Wound Bed 100

## 2018-10-02 LAB
CULTURE: ABNORMAL
DIRECT EXAM: ABNORMAL
DIRECT EXAM: ABNORMAL
Lab: ABNORMAL
ORGANISM: ABNORMAL
SPECIMEN DESCRIPTION: ABNORMAL
STATUS: ABNORMAL

## 2018-10-10 ENCOUNTER — HOSPITAL ENCOUNTER (OUTPATIENT)
Dept: WOUND CARE | Age: 63
Discharge: HOME | End: 2018-10-10
Payer: COMMERCIAL

## 2018-10-10 VITALS
HEART RATE: 69 BPM | HEIGHT: 62 IN | DIASTOLIC BLOOD PRESSURE: 84 MMHG | SYSTOLIC BLOOD PRESSURE: 132 MMHG | TEMPERATURE: 97.8 F | BODY MASS INDEX: 38.64 KG/M2 | WEIGHT: 210 LBS | RESPIRATION RATE: 18 BRPM

## 2018-10-10 PROCEDURE — 6370000000 HC RX 637 (ALT 250 FOR IP): Performed by: SURGERY

## 2018-10-10 PROCEDURE — 11042 DBRDMT SUBQ TIS 1ST 20SQCM/<: CPT

## 2018-10-10 RX ORDER — LIDOCAINE HYDROCHLORIDE 40 MG/ML
SOLUTION TOPICAL ONCE
Status: COMPLETED | OUTPATIENT
Start: 2018-10-10 | End: 2018-10-10

## 2018-10-10 RX ORDER — SULFAMETHOXAZOLE AND TRIMETHOPRIM 800; 160 MG/1; MG/1
1 TABLET ORAL 2 TIMES DAILY
Qty: 20 TABLET | Refills: 0 | Status: SHIPPED | OUTPATIENT
Start: 2018-10-10 | End: 2018-10-20

## 2018-10-10 RX ADMIN — LIDOCAINE HYDROCHLORIDE 20 ML: 40 SOLUTION TOPICAL at 08:49

## 2018-10-10 ASSESSMENT — PAIN SCALES - GENERAL: PAINLEVEL_OUTOF10: 0

## 2018-10-10 NOTE — PROGRESS NOTES
Hannah Del Castillo MD at [address] [facility]    stomach stapling, [facility]    TONSILLECTOMY      3 y.o.    WISDOM TOOTH EXTRACTION         FAMILY HISTORY    Family History   Problem Relation Age of Onset    Diabetes Maternal Grandmother     COPD Mother     Rheum Arthritis Mother     Osteoarthritis Mother     Osteoporosis Mother     Other Mother         AAA    High Blood Pressure Mother     Glaucoma Father     Arthritis Father     Cancer Father         throat cancer twice        SOCIAL HISTORY    Social History   Substance Use Topics    Smoking status: Former Smoker     Quit date: 6/20/2013    Smokeless tobacco: Never Used    Alcohol use 2.4 oz/week     4 Standard drinks or equivalent per week      Comment: occasionally weekends       ALLERGIES    Allergies   Allergen Reactions    Nickel      In foods    Percocet [Oxycodone-Acetaminophen]        MEDICATIONS    Current Outpatient Prescriptions on File Prior to Encounter   Medication Sig Dispense Refill    ondansetron (ZOFRAN) 4 MG tablet Take 1 tablet by mouth daily as needed for Nausea or Vomiting 20 tablet 0    Calcium Citrate-Vitamin D (CALCIUM CITRATE + D PO) Take by mouth daily      MAGNESIUM CHLORIDE PO Take by mouth daily      Thiamine HCl (VITAMIN B-1) 250 MG tablet Take 250 mg by mouth daily      bisoprolol-hydrochlorothiazide (ZIAC) 5-6.25 MG per tablet Take 1 tablet by mouth daily       Potassium 99 MG TABS Take 99 mg by mouth daily       Biotin 5000 MCG CAPS Take 5,000 mcg by mouth daily       Ascorbic Acid (VITAMIN C) 1000 MG tablet Take 1,000 mg by mouth daily      Cholecalciferol (VITAMIN D3) 2000 units CAPS Take by mouth daily       alendronate (FOSAMAX) 70 MG tablet Take 1 tablet by mouth every 7 days 4 tablet 3    VENTOLIN  (90 BASE) MCG/ACT inhaler Inhale 2 puffs into the lungs every 6 hours as needed       docusate sodium (COLACE) 100 MG capsule Take 1 capsule by Procedure Note  Indications:  Based on my examination of this patient's wound(s)/ulcer(s) today, debridement is required to promote healing and evaluate the wound base. Performed by: Don Steiner DO    Consent obtained:  Yes    Time out taken:  Yes    Pain Control: Anesthetic  Anesthetic: 4% Lidocaine Liquid Topical       Debridement:Excisional Debridement    Using curette the wound(s)/ulcer(s) was/were sharply debrided down through and including the removal of subcutaneous tissue. Devitalized Tissue Debrided:  fibrin, biofilm and slough    Pre Debridement Measurements:  Are located in the Wound/Ulcer Documentation Flow Sheet    Wound/Ulcer #: 4    Post Debridement Measurements:  Wound/Ulcer Descriptions are Pre Debridement except measurements:    Negative Pressure Wound Therapy Abdomen Medial (Active)   Number of days: 1120       Wound 09/23/15 Wound #1 mid abdominal (umbilicus) Surgical , 4-64-76 abscess (Active)   Number of days: 1113       Wound 08/09/18 Other (Comment) Umbilicus Other (Comment) Wound # 4 Umbilicus,new today. (Active)   Wound Type Wound 10/10/2018  8:42 AM   Wound Other 8/15/2018 10:56 AM   Dressing Status Other (Comment) 10/10/2018  8:42 AM   Dressing Changed Changed/New 10/10/2018  8:42 AM   Wound Cleansed Rinsed/Irrigated with saline 9/26/2018  8:43 AM   Wound Length (cm) 0.2 cm 10/10/2018  8:53 AM   Wound Width (cm) 0.2 cm 10/10/2018  8:53 AM   Wound Depth (cm)  4.0 10/10/2018  8:53 AM   Calculated Wound Size (cm^2) (l*w) 0.04 cm^2 10/10/2018  8:53 AM   Change in Wound Size % (l*w) 80 10/10/2018  8:53 AM   Distance Tunneling (cm) 4.3 cm 8/29/2018  9:06 AM   Tunneling Position ___ O'Clock 1 8/29/2018  9:06 AM   Wound Assessment Red 10/10/2018  8:42 AM   Drainage Amount Scant 10/10/2018  8:42 AM   Drainage Description Serous 10/10/2018  8:42 AM   Odor None 10/10/2018  8:42 AM   Margins Defined edges 10/10/2018  8:42 AM   [place]-wound Assessment Clean;Dry; Intact 10/10/2018 8:42 AM   Red%Wound Bed 100 10/10/2018  8:42 AM   Culture Taken Yes 9/26/2018  9:23 AM   Number of days: 61       Incision 09/16/15 Abdomen Medial (Active)   Number of days: 1120       Incision 06/11/18 Shoulder Right (Active)   Number of days: 120       Percent of Wound(s)/Ulcer(s) Debrided: 100%    Total Surface Area Debrided:  0.04 sq cm       Diabetic/Pressure/Non Pressure Ulcers only:  Ulcer: N/A      Estimated Blood Loss:  Minimal    Hemostasis Achieved:  by pressure    Procedural Pain:  0  / 10     Post Procedural Pain:  0 / 10     Response to treatment:  Well tolerated by patient. Plan:     Treatment Note please see attached Discharge Instructions    Written patient dismissal instructions given to patient and signed by patient or POA. Discharge Instructions                                                                                                                                                                                                        [facility] WOUND and HYPERBAR[facility] TREATMENT  CENTER                                 Visit Carmen Instructions / Physician Orders  DATE:  10/10/18     Home Care:      SUPPLIES ORDERED THRU:      Wound Location:  Umbilicus     Cleanse with:  Liquid antibacterial soap and water, rinse well     Dressing Orders: Jocelin Nichols 1/4\" plain packing into wound, cover with dry gauze, and secure with mefix tape     Frequency: Daily     Additional Orders: Increase protein to diet (meat, cheese, eggs, fish, peanut butter, nuts and beans)  Multivitamin daily     HYPERBARIC TREATMENT-                TREATMENT #     Your next appointment with [address] [place] [address] is in 3 weeks      ROOM TYPE   [] CHAIR     [x] BED   [] EITHER        TIME [x] 45 MIN     [] 60 MIN     (Please note your next appointment above and if you are unable to keep, kindly give a 24 hour notice.  Thank you.)     If you experience any of the following, please call the [address] [place] [address] during business

## 2018-10-15 ENCOUNTER — HOSPITAL ENCOUNTER (OUTPATIENT)
Dept: ULTRASOUND IMAGING | Age: 63
Discharge: HOME | End: 2018-10-17
Payer: COMMERCIAL

## 2018-10-15 DIAGNOSIS — E04.2 NONTOXIC MULTINODULAR GOITER: ICD-10-CM

## 2018-10-15 PROCEDURE — 76536 US EXAM OF HEAD AND NECK: CPT

## 2018-11-05 ENCOUNTER — ANESTHESIA (OUTPATIENT)
Dept: OPERATING ROOM | Age: 63
End: 2018-11-05
Payer: COMMERCIAL

## 2018-11-05 ENCOUNTER — ANESTHESIA EVENT (OUTPATIENT)
Dept: OPERATING ROOM | Age: 63
End: 2018-11-05
Payer: COMMERCIAL

## 2018-11-05 ENCOUNTER — HOSPITAL ENCOUNTER (OUTPATIENT)
Age: 63
Setting detail: OUTPATIENT SURGERY
Discharge: HOME | End: 2018-11-05
Attending: SURGERY | Admitting: SURGERY
Payer: COMMERCIAL

## 2018-11-05 VITALS
SYSTOLIC BLOOD PRESSURE: 142 MMHG | RESPIRATION RATE: 13 BRPM | TEMPERATURE: 98.4 F | HEART RATE: 83 BPM | BODY MASS INDEX: 38.14 KG/M2 | HEIGHT: 61 IN | OXYGEN SATURATION: 97 % | WEIGHT: 202 LBS | DIASTOLIC BLOOD PRESSURE: 77 MMHG

## 2018-11-05 VITALS — TEMPERATURE: 97.2 F | OXYGEN SATURATION: 99 % | SYSTOLIC BLOOD PRESSURE: 129 MMHG | DIASTOLIC BLOOD PRESSURE: 78 MMHG

## 2018-11-05 DIAGNOSIS — S31.109A OPEN WOUND OF ABDOMINAL WALL, INITIAL ENCOUNTER: Primary | Chronic | ICD-10-CM

## 2018-11-05 LAB
CULTURE: NORMAL
DIRECT EXAM: NORMAL
Lab: NORMAL
SPECIMEN DESCRIPTION: NORMAL
STATUS: NORMAL

## 2018-11-05 PROCEDURE — 6360000002 HC RX W HCPCS: Performed by: SURGERY

## 2018-11-05 PROCEDURE — 87070 CULTURE OTHR SPECIMN AEROBIC: CPT

## 2018-11-05 PROCEDURE — 7100000031 HC ASPR PHASE II RECOVERY - ADDTL 15 MIN: Performed by: SURGERY

## 2018-11-05 PROCEDURE — 7100000030 HC ASPR PHASE II RECOVERY - FIRST 15 MIN: Performed by: SURGERY

## 2018-11-05 PROCEDURE — 87102 FUNGUS ISOLATION CULTURE: CPT

## 2018-11-05 PROCEDURE — 7100000001 HC PACU RECOVERY - ADDTL 15 MIN: Performed by: SURGERY

## 2018-11-05 PROCEDURE — 3700000000 HC ANESTHESIA ATTENDED CARE: Performed by: SURGERY

## 2018-11-05 PROCEDURE — 87075 CULTR BACTERIA EXCEPT BLOOD: CPT

## 2018-11-05 PROCEDURE — 87205 SMEAR GRAM STAIN: CPT

## 2018-11-05 PROCEDURE — 6360000002 HC RX W HCPCS

## 2018-11-05 PROCEDURE — 88305 TISSUE EXAM BY PATHOLOGIST: CPT

## 2018-11-05 PROCEDURE — 6370000000 HC RX 637 (ALT 250 FOR IP): Performed by: ANESTHESIOLOGY

## 2018-11-05 PROCEDURE — 2580000003 HC RX 258: Performed by: SURGERY

## 2018-11-05 PROCEDURE — 3600000012 HC SURGERY LEVEL 2 ADDTL 15MIN: Performed by: SURGERY

## 2018-11-05 PROCEDURE — 3600000002 HC SURGERY LEVEL 2 BASE: Performed by: SURGERY

## 2018-11-05 PROCEDURE — 2500000003 HC RX 250 WO HCPCS: Performed by: SURGERY

## 2018-11-05 PROCEDURE — 3700000001 HC ADD 15 MINUTES (ANESTHESIA): Performed by: SURGERY

## 2018-11-05 PROCEDURE — 87176 TISSUE HOMOGENIZATION CULTR: CPT

## 2018-11-05 PROCEDURE — 2709999900 HC NON-CHARGEABLE SUPPLY: Performed by: SURGERY

## 2018-11-05 PROCEDURE — 7100000000 HC PACU RECOVERY - FIRST 15 MIN: Performed by: SURGERY

## 2018-11-05 PROCEDURE — 2500000003 HC RX 250 WO HCPCS

## 2018-11-05 RX ORDER — HYDROCODONE BITARTRATE AND ACETAMINOPHEN 5; 325 MG/1; MG/1
1 TABLET ORAL ONCE
Status: COMPLETED | OUTPATIENT
Start: 2018-11-05 | End: 2018-11-05

## 2018-11-05 RX ORDER — LIDOCAINE HYDROCHLORIDE 10 MG/ML
INJECTION, SOLUTION EPIDURAL; INFILTRATION; INTRACAUDAL; PERINEURAL PRN
Status: DISCONTINUED | OUTPATIENT
Start: 2018-11-05 | End: 2018-11-05 | Stop reason: SDUPTHER

## 2018-11-05 RX ORDER — ONDANSETRON 2 MG/ML
INJECTION INTRAMUSCULAR; INTRAVENOUS PRN
Status: DISCONTINUED | OUTPATIENT
Start: 2018-11-05 | End: 2018-11-05 | Stop reason: SDUPTHER

## 2018-11-05 RX ORDER — FENTANYL CITRATE 50 UG/ML
INJECTION, SOLUTION INTRAMUSCULAR; INTRAVENOUS PRN
Status: DISCONTINUED | OUTPATIENT
Start: 2018-11-05 | End: 2018-11-05 | Stop reason: SDUPTHER

## 2018-11-05 RX ORDER — BUPIVACAINE HYDROCHLORIDE 5 MG/ML
INJECTION, SOLUTION EPIDURAL; INTRACAUDAL PRN
Status: DISCONTINUED | OUTPATIENT
Start: 2018-11-05 | End: 2018-11-05 | Stop reason: HOSPADM

## 2018-11-05 RX ORDER — DEXAMETHASONE SODIUM PHOSPHATE 4 MG/ML
INJECTION, SOLUTION INTRA-ARTICULAR; INTRALESIONAL; INTRAMUSCULAR; INTRAVENOUS; SOFT TISSUE PRN
Status: DISCONTINUED | OUTPATIENT
Start: 2018-11-05 | End: 2018-11-05 | Stop reason: SDUPTHER

## 2018-11-05 RX ORDER — DIPHENHYDRAMINE HYDROCHLORIDE 50 MG/ML
12.5 INJECTION INTRAMUSCULAR; INTRAVENOUS
Status: DISCONTINUED | OUTPATIENT
Start: 2018-11-05 | End: 2018-11-05 | Stop reason: HOSPADM

## 2018-11-05 RX ORDER — MEPERIDINE HYDROCHLORIDE 50 MG/ML
12.5 INJECTION INTRAMUSCULAR; INTRAVENOUS; SUBCUTANEOUS EVERY 5 MIN PRN
Status: DISCONTINUED | OUTPATIENT
Start: 2018-11-05 | End: 2018-11-05 | Stop reason: HOSPADM

## 2018-11-05 RX ORDER — SODIUM CHLORIDE, SODIUM LACTATE, POTASSIUM CHLORIDE, CALCIUM CHLORIDE 600; 310; 30; 20 MG/100ML; MG/100ML; MG/100ML; MG/100ML
INJECTION, SOLUTION INTRAVENOUS CONTINUOUS
Status: DISCONTINUED | OUTPATIENT
Start: 2018-11-05 | End: 2018-11-05 | Stop reason: HOSPADM

## 2018-11-05 RX ORDER — PROPOFOL 10 MG/ML
INJECTION, EMULSION INTRAVENOUS PRN
Status: DISCONTINUED | OUTPATIENT
Start: 2018-11-05 | End: 2018-11-05 | Stop reason: SDUPTHER

## 2018-11-05 RX ORDER — MIDAZOLAM HYDROCHLORIDE 1 MG/ML
INJECTION INTRAMUSCULAR; INTRAVENOUS PRN
Status: DISCONTINUED | OUTPATIENT
Start: 2018-11-05 | End: 2018-11-05 | Stop reason: SDUPTHER

## 2018-11-05 RX ORDER — EPHEDRINE SULFATE 50 MG/ML
INJECTION, SOLUTION INTRAVENOUS PRN
Status: DISCONTINUED | OUTPATIENT
Start: 2018-11-05 | End: 2018-11-05 | Stop reason: SDUPTHER

## 2018-11-05 RX ORDER — MORPHINE SULFATE 2 MG/ML
2 INJECTION, SOLUTION INTRAMUSCULAR; INTRAVENOUS EVERY 5 MIN PRN
Status: DISCONTINUED | OUTPATIENT
Start: 2018-11-05 | End: 2018-11-05 | Stop reason: HOSPADM

## 2018-11-05 RX ORDER — ONDANSETRON 2 MG/ML
4 INJECTION INTRAMUSCULAR; INTRAVENOUS
Status: DISCONTINUED | OUTPATIENT
Start: 2018-11-05 | End: 2018-11-05 | Stop reason: HOSPADM

## 2018-11-05 RX ORDER — HYDROCODONE BITARTRATE AND ACETAMINOPHEN 5; 325 MG/1; MG/1
1 TABLET ORAL EVERY 6 HOURS PRN
Qty: 28 TABLET | Refills: 0 | Status: SHIPPED | OUTPATIENT
Start: 2018-11-05 | End: 2018-11-08

## 2018-11-05 RX ORDER — LABETALOL HYDROCHLORIDE 5 MG/ML
5 INJECTION, SOLUTION INTRAVENOUS EVERY 10 MIN PRN
Status: DISCONTINUED | OUTPATIENT
Start: 2018-11-05 | End: 2018-11-05 | Stop reason: HOSPADM

## 2018-11-05 RX ADMIN — PROPOFOL 200 MG: 10 INJECTION, EMULSION INTRAVENOUS at 10:01

## 2018-11-05 RX ADMIN — FENTANYL CITRATE 100 MCG: 50 INJECTION, SOLUTION INTRAMUSCULAR; INTRAVENOUS at 10:07

## 2018-11-05 RX ADMIN — EPHEDRINE SULFATE 10 MG: 50 INJECTION INTRAMUSCULAR; INTRAVENOUS; SUBCUTANEOUS at 10:26

## 2018-11-05 RX ADMIN — Medication 2 G: at 10:05

## 2018-11-05 RX ADMIN — EPHEDRINE SULFATE 5 MG: 50 INJECTION INTRAMUSCULAR; INTRAVENOUS; SUBCUTANEOUS at 10:39

## 2018-11-05 RX ADMIN — ONDANSETRON 8 MG: 2 INJECTION INTRAMUSCULAR; INTRAVENOUS at 10:35

## 2018-11-05 RX ADMIN — SODIUM CHLORIDE, POTASSIUM CHLORIDE, SODIUM LACTATE AND CALCIUM CHLORIDE: 600; 310; 30; 20 INJECTION, SOLUTION INTRAVENOUS at 10:12

## 2018-11-05 RX ADMIN — EPHEDRINE SULFATE 10 MG: 50 INJECTION INTRAMUSCULAR; INTRAVENOUS; SUBCUTANEOUS at 10:21

## 2018-11-05 RX ADMIN — HYDROCODONE BITARTRATE AND ACETAMINOPHEN 1 TABLET: 5; 325 TABLET ORAL at 12:52

## 2018-11-05 RX ADMIN — MIDAZOLAM 2 MG: 1 INJECTION INTRAMUSCULAR; INTRAVENOUS at 09:52

## 2018-11-05 RX ADMIN — DEXAMETHASONE SODIUM PHOSPHATE 8 MG: 4 INJECTION, SOLUTION INTRAMUSCULAR; INTRAVENOUS at 10:35

## 2018-11-05 RX ADMIN — FENTANYL CITRATE 25 MCG: 50 INJECTION, SOLUTION INTRAMUSCULAR; INTRAVENOUS at 10:35

## 2018-11-05 RX ADMIN — LIDOCAINE HYDROCHLORIDE 100 MG: 10 INJECTION, SOLUTION EPIDURAL; INFILTRATION; INTRACAUDAL; PERINEURAL at 10:01

## 2018-11-05 RX ADMIN — SODIUM CHLORIDE, POTASSIUM CHLORIDE, SODIUM LACTATE AND CALCIUM CHLORIDE: 600; 310; 30; 20 INJECTION, SOLUTION INTRAVENOUS at 08:27

## 2018-11-05 RX ADMIN — FENTANYL CITRATE 25 MCG: 50 INJECTION, SOLUTION INTRAMUSCULAR; INTRAVENOUS at 10:41

## 2018-11-05 ASSESSMENT — PAIN SCALES - GENERAL
PAINLEVEL_OUTOF10: 3
PAINLEVEL_OUTOF10: 5
PAINLEVEL_OUTOF10: 1
PAINLEVEL_OUTOF10: 0
PAINLEVEL_OUTOF10: 5
PAINLEVEL_OUTOF10: 0

## 2018-11-05 ASSESSMENT — PULMONARY FUNCTION TESTS
PIF_VALUE: 12
PIF_VALUE: 6
PIF_VALUE: 12
PIF_VALUE: 12
PIF_VALUE: 0
PIF_VALUE: 5
PIF_VALUE: 6
PIF_VALUE: 13
PIF_VALUE: 1
PIF_VALUE: 14
PIF_VALUE: 3
PIF_VALUE: 6
PIF_VALUE: 3
PIF_VALUE: 6
PIF_VALUE: 1
PIF_VALUE: 12
PIF_VALUE: 3
PIF_VALUE: 13
PIF_VALUE: 13
PIF_VALUE: 7
PIF_VALUE: 0
PIF_VALUE: 13
PIF_VALUE: 4
PIF_VALUE: 3
PIF_VALUE: 4
PIF_VALUE: 4
PIF_VALUE: 14
PIF_VALUE: 15
PIF_VALUE: 3
PIF_VALUE: 14
PIF_VALUE: 5
PIF_VALUE: 3
PIF_VALUE: 4
PIF_VALUE: 12
PIF_VALUE: 13
PIF_VALUE: 0
PIF_VALUE: 13
PIF_VALUE: 13
PIF_VALUE: 0
PIF_VALUE: 25
PIF_VALUE: 1
PIF_VALUE: 0
PIF_VALUE: 4
PIF_VALUE: 13
PIF_VALUE: 13
PIF_VALUE: 12
PIF_VALUE: 1
PIF_VALUE: 11
PIF_VALUE: 13
PIF_VALUE: 13
PIF_VALUE: 1
PIF_VALUE: 3
PIF_VALUE: 13
PIF_VALUE: 3
PIF_VALUE: 2

## 2018-11-05 ASSESSMENT — PAIN DESCRIPTION - DESCRIPTORS: DESCRIPTORS: BURNING

## 2018-11-05 ASSESSMENT — ENCOUNTER SYMPTOMS
STRIDOR: 0
SHORTNESS OF BREATH: 0

## 2018-11-05 ASSESSMENT — PAIN DESCRIPTION - LOCATION
LOCATION: ABDOMEN
LOCATION: ABDOMEN

## 2018-11-05 ASSESSMENT — PAIN DESCRIPTION - PAIN TYPE: TYPE: SURGICAL PAIN

## 2018-11-05 ASSESSMENT — PAIN - FUNCTIONAL ASSESSMENT: PAIN_FUNCTIONAL_ASSESSMENT: 0-10

## 2018-11-05 ASSESSMENT — LIFESTYLE VARIABLES: SMOKING_STATUS: 0

## 2018-11-05 NOTE — DISCHARGE INSTR - COC
10/10/2018  8:53 AM   Calculated Wound Size (cm^2) (l*w) 0.04 cm^2 10/10/2018  8:53 AM   Change in Wound Size % (l*w) 80 10/10/2018  8:53 AM   Wound Assessment Red 10/10/2018  8:42 AM   Drainage Amount Scant 10/10/2018  8:42 AM   Drainage Description Serous 10/10/2018  8:42 AM   Odor None 10/10/2018  8:42 AM   Margins Defined edges 10/10/2018  8:42 AM   [place]-wound Assessment Clean;Dry; Intact 10/10/2018  8:42 AM   Red%Wound Bed 100 10/10/2018  8:42 AM   Number of days: 87       Incision 09/16/15 Abdomen Medial (Active)   Number of days: 1146       Incision 06/11/18 Shoulder Right (Active)   Number of days: 146       Incision 11/05/18 Abdomen (Active)   Wound Assessment Clean;Dry; Intact 11/5/2018 10:54 AM   [place]-wound Assessment Clean;Dry; Intact 11/5/2018 10:54 AM   Drainage Amount None 11/5/2018 10:54 AM   Dressing/Treatment Adhesive bandage;Vacuum dressing 11/5/2018 10:54 AM   Dressing Changed Changed/New 11/5/2018 10:54 AM   Dressing Status Clean;Dry; Intact 11/5/2018 10:54 AM   Number of days: 0        Elimination:  Continence:   · Bowel: No  · Bladder: No  Urinary Catheter: None   Colostomy/Ileostomy/Ileal Conduit: No       Date of Last BM: today    Intake/Output Summary (Last 24 hours) at 11/05/18 1119  Last data filed at 11/05/18 1059   Gross per 24 hour   Intake             1500 ml   Output               15 ml   Net             1485 ml     No intake/output data recorded. Safety Concerns:     None    Impairments/Disabilities:      None    Nutrition Therapy:  Current Nutrition Therapy:   - Oral Diet:  General    Routes of Feeding: Oral  Liquids: No Restrictions  Daily Fluid Restriction: no  Last Modified Barium Swallow with Video (Video Swallowing Test): not done    Treatments at the Time of Hospital Discharge:   Respiratory Treatments: na  Oxygen Therapy:  is not on home oxygen therapy.   Ventilator:    - No ventilator support    Rehab Therapies: na  Weight Bearing Status/Restrictions: No weight bearing restirctions  Other Medical Equipment (for information only, NOT a DME order):  na  Other Treatments: na    Patient's personal belongings (please select all that are sent with patient):  Glasses    RN SIGNATURE:  Electronically signed by Norma Friedman RN on 11/5/18 at 12:30 PM    CASE MANAGEMENT/SOCIAL WORK SECTION    Inpatient Status Date:na    Readmission Risk Assessment Score:  Readmission Risk              Risk of Unplanned Readmission:        7           Discharging to Facility/ Agency   · Name:   · Address:  · Phone:  · Fax:    Dialysis Facility (if applicable)   · Name:  · Address:  · Dialysis Schedule:  · Phone:  · Fax:    / signature:     PHYSICIAN SECTION    Prognosis: Good    Condition at Discharge: Stable    Rehab Potential (if transferring to Rehab): Good    Recommended Labs or Other Treatments After Discharge: wound vac change three times a week    Physician Certification: I certify the above information and transfer of Karin French  is necessary for the continuing treatment of the diagnosis listed and that she requires Home Care for greater 30 days.      Update Admission H&P: No change in H&P    PHYSICIAN SIGNATURE:  Electronically signed by Tye Smith DO on 11/5/18 at 11:21 AM

## 2018-11-05 NOTE — ANESTHESIA PRE PROCEDURE
Department of Anesthesiology  Preprocedure Note       Name:  Reyes De La Torre   Age:  61 y.o.  :  1955                                          MRN:  [id number]         Date:  2018      Surgeon: Sumi Jefferson):  Deisy Jaimes DO    Procedure: WOUND EXPLORATION W/WOUND VAC PLACEMENT / I&D (Left )    Medications prior to admission:   Prior to Admission medications    Medication Sig Start Date End Date Taking? Authorizing Provider   Calcium Citrate-Vitamin D (CALCIUM CITRATE + D PO) Take by mouth daily   Yes Historical Provider, MD   MAGNESIUM CHLORIDE PO Take by mouth daily   Yes Historical Provider, MD   Thiamine HCl (VITAMIN B-1) 250 MG tablet Take 250 mg by mouth daily   Yes Historical Provider, MD   bisoprolol-hydrochlorothiazide [facility]) 5-6.25 MG per tablet Take 1 tablet by mouth daily  16  Yes Historical Provider, MD   Potassium 99 MG TABS Take 99 mg by mouth daily    Yes Historical Provider, MD   Biotin 5000 MCG CAPS Take 5,000 mcg by mouth daily    Yes Historical Provider, MD   Ascorbic Acid (VITAMIN C) 1000 MG tablet Take 1,000 mg by mouth daily   Yes Historical Provider, MD   Cholecalciferol (VITAMIN D3) 2000 units CAPS Take by mouth daily    Yes Historical Provider, MD   alendronate (FOSAMAX) 70 MG tablet Take 1 tablet by mouth every 7 days 16  Yes KERRIE Pratt CNM   docusate sodium (COLACE) 100 MG capsule Take 1 capsule by mouth daily as needed for Constipation 9/17/15  Yes Deisy Jaimes,    SYMBICORT 160-4.5 MCG/ACT AERO Inhale 2 puffs into the lungs 2 times daily  11/15/14  Yes Historical Provider, MD   SYNTHROID 50 MCG tablet Take 50 mcg by mouth Daily  14  Yes Historical Provider, MD   omeprazole (PRILOSEC) 20 MG capsule Take 20 mg by mouth 2 times daily  10/19/14  Yes Historical Provider, MD   Multiple Vitamins-Minerals (OCUVITE PO) Take by mouth daily    Yes Historical Provider, MD   FLUoxetine (PROZAC) 20 MG capsule Take 20 mg by mouth daily.    Yes wound 10/7/2015    Osteopenia     Tendonitis     Unspecified sleep apnea     CPAP    Urinary incontinence     Vision abnormalities     glasses    Vitamin B1 deficiency     Vitamin D deficiency        Past Surgical History:        Procedure Laterality Date    ABDOMINOPLASTY  1995    ABSCESS DRAINAGE  9-16-15    abd. wall abscess    BUNIONECTOMY Bilateral 2003    CATARACT REMOVAL Bilateral 2007    CHOLECYSTECTOMY  2003    COLONOSCOPY  2012    negative, repeat in 10 years    ENDOSCOPY, COLON, DIAGNOSTIC      EYE SURGERY      see below other history    GLAUCOMA SURGERY  2007    KNEE ARTHROSCOPY Right 2009    torn meniscus    IA SHLDR ARTHROSCOP,SURG,W/ROTAT CUFF REPR Right 6/11/2018    SHOULDER ARTHROSCOPY ROTATOR CUFF REPAIR performed by Joan Manuel MD at [address] [place] [address]    stomach stapling, [facility]    TONSILLECTOMY      3 y.o.    WISDOM TOOTH EXTRACTION         Social History:    Social History   Substance Use Topics    Smoking status: Former Smoker     Quit date: 6/20/2013    Smokeless tobacco: Never Used    Alcohol use 2.4 oz/week     4 Standard drinks or equivalent per week      Comment: occasionally weekends                                Counseling given: Not Answered      Vital Signs (Current):   Vitals:    11/05/18 0805   BP: 120/73   Pulse: 70   Resp: 16   Temp: 98.2 °F (36.8 °C)   TempSrc: Oral   SpO2: 98%   Weight: 202 lb (91.6 kg)   Height: 5' 1\" (1.549 m)                                              BP Readings from Last 3 Encounters:   11/05/18 120/73   10/10/18 132/84   09/26/18 130/82       NPO Status: Time of last liquid consumption: 2200                        Time of last solid consumption: 2100                        Date of last liquid consumption: 11/04/18                        Date of last solid food consumption: 11/04/18    BMI:   Wt Readings from Last 3 Encounters:   11/05/18 202 lb (91.6 kg)   10/10/18 210 lb (95.3 kg)   09/26/18 217 lb (98.4 kg)     Body mass index is 38.17 kg/m². CBC:   Lab Results   Component Value Date    WBC 7.0 08/09/2018    RBC 4.49 08/09/2018    HGB 12.8 08/09/2018    HCT 39.3 08/09/2018    MCV 87.7 08/09/2018    RDW 15.5 08/09/2018     08/09/2018     LR    CMP:   Lab Results   Component Value Date     08/09/2018    K 4.2 08/09/2018     08/09/2018    CO2 26 08/09/2018    BUN 14 08/09/2018    CREATININE 0.66 08/09/2018    GFRAA >60 08/09/2018    LABGLOM >60 08/09/2018    GLUCOSE 102 08/09/2018    PROT 6.8 08/15/2018    CALCIUM 10.0 08/09/2018    BILITOT 0.26 07/18/2017    ALKPHOS 65 07/18/2017    AST 30 07/18/2017    ALT 42 07/18/2017       POC Tests: No results for input(s): POCGLU, POCNA, POCK, POCCL, POCBUN, POCHEMO, POCHCT in the last 72 hours. Coags:   Lab Results   Component Value Date    PROTIME 11.3 09/16/2015    INR 1.1 09/16/2015    APTT 39.1 06/27/2012       HCG (If Applicable): No results found for: PREGTESTUR, PREGSERUM, HCG, HCGQUANT     ABGs: No results found for: PHART, PO2ART, XZX8FDE, MYX8HGP, BEART, S3KTEVTB     Type & Screen (If Applicable):  No results found for: LABABO, [address]    Anesthesia Evaluation  Patient summary reviewed no history of anesthetic complications:   Airway: Mallampati: II  TM distance: >3 FB   Neck ROM: full  Mouth opening: > = 3 FB Dental: normal exam         Pulmonary:normal exam  breath sounds clear to auscultation  (+) sleep apnea:  asthma: allergic asthma,     (-) pneumonia, COPD, shortness of breath, recent URI, rhonchi, wheezes, rales, stridor and not a current smoker          Patient did not smoke on day of surgery.                  Cardiovascular:  Exercise tolerance: good (>4 METS),   (+) hypertension: no interval change,     (-) pacemaker, valvular problems/murmurs, past MI, CAD, CABG/stent, dysrhythmias,  angina,  CHF, orthopnea, PND,  PEARL, murmur, weak pulses,  friction rub, systolic click, carotid bruit,  JVD and

## 2018-11-05 NOTE — H&P
HISTORY and [facility] [id number]       NAME:  Gwendolyn Webb  MRN: [id number]   YOB: 1955   Date: 11/5/2018   Age: 61 y.o. Gender: female       COMPLAINT AND PRESENT HISTORY:     Gwendolyn Webb is 61 y.o.,  female, here for  Non-healing left abdominal wound; she will be having a  WOUND EXPLORATION W/WOUND VAC PLACEMENT / I&D-Left. Pt has a hx of abdominoplasty in 1995 and a I & D done in  2015. The symptoms started 3 years ago of a draining wound. Pt has a hx of a chronic infection of the abdomen. Pt denies any pain. Pt reports that she still has drainage of brownish color with mild odor. Pt denies any fever or redness around site. Pt has had care at wound care with Dr. Carolin Kaur. No fever or chills. No recent falls or trauma.        PAST MEDICAL HISTORY     Past Medical History:   Diagnosis Date    Asthma     Depression     GERD (gastroesophageal reflux disease)     Glaucoma associated with anomalies of iris     had surgery, no eye drops    High blood triglycerides 02/21/2018    191 on     Hypertension     Hypothyroidism     Left elbow fracture     no surgery    Obesity     Open abdominal wall wound 10/7/2015    Osteopenia     Tendonitis     Unspecified sleep apnea     CPAP    Urinary incontinence     Vision abnormalities     glasses    Vitamin B1 deficiency     Vitamin D deficiency        SURGICAL HISTORY       Past Surgical History:   Procedure Laterality Date    ABDOMINOPLASTY  1995    ABSCESS DRAINAGE  9-16-15    abd. wall abscess    BUNIONECTOMY Bilateral 2003    CATARACT REMOVAL Bilateral 2007    CHOLECYSTECTOMY  2003    COLONOSCOPY  2012    negative, repeat in 10 years    ENDOSCOPY, COLON, DIAGNOSTIC      EYE SURGERY      see below other history    GLAUCOMA SURGERY  2007    KNEE ARTHROSCOPY Right 2009    torn meniscus    CT SHLDR ARTHROSCOP,SURG,W/ROTAT CUFF REPR Right 6/11/2018    SHOULDER ARTHROSCOPY ROTATOR CUFF REPAIR performed by Rodrigue Martinez MD at [facility]    stomach stapling, [facility]    TONSILLECTOMY      3 y.o.    WISDOM TOOTH EXTRACTION         FAMILY HISTORY       Family History   Problem Relation Age of Onset    Diabetes Maternal Grandmother     COPD Mother     Rheum Arthritis Mother     Osteoarthritis Mother     Osteoporosis Mother     Other Mother         AAA    High Blood Pressure Mother     Glaucoma Father     Arthritis Father     Cancer Father         throat cancer twice        SOCIAL HISTORY       Social History     Social History    Marital status:      Spouse name: N/A    Number of children: N/A    Years of education: N/A     Social History Main Topics    Smoking status: Former Smoker     Quit date: 6/20/2013    Smokeless tobacco: Never Used    Alcohol use 2.4 oz/week     4 Standard drinks or equivalent per week      Comment: occasionally weekends    Drug use: No    Sexual activity: Yes     Partners: Male     Birth control/ protection: Post-menopausal     Other Topics Concern    None     Social History Narrative    None           REVIEW OF SYSTEMS      Allergies   Allergen Reactions    Nickel      In foods    Percocet [Oxycodone-Acetaminophen]        No current facility-administered medications on file prior to encounter.       Current Outpatient Prescriptions on File Prior to Encounter   Medication Sig Dispense Refill    Calcium Citrate-Vitamin D (CALCIUM CITRATE + D PO) Take by mouth daily      MAGNESIUM CHLORIDE PO Take by mouth daily      Thiamine HCl (VITAMIN B-1) 250 MG tablet Take 250 mg by mouth daily      bisoprolol-hydrochlorothiazide (ZIAC) 5-6.25 MG per tablet Take 1 tablet by mouth daily       Potassium 99 MG TABS Take 99 mg by mouth daily       Biotin 5000 MCG CAPS Take 5,000 mcg by mouth daily       Ascorbic Acid (VITAMIN C) 1000 MG tablet Take 1,000 mg by mouth daily      Cholecalciferol (VITAMIN D3) 2000 expansion. HEART:  RRR S1 > S2. No audible murmurs or gallops. LUNGS:  Equal on expansion, normal breath sounds. No adventitious sounds. ABDOMEN:  Obese. Soft on palpation. No localized tenderness. No guarding or rigidity. No palpable hepatosplenomegaly. Deep abdominal  Ulcer, no redness. LYMPHATICS:  No palpable cervical lymphadenopathy. LOCOMOTOR, BACK AND SPINE:  No tenderness or deformities. EXTREMITIES:  Symmetrical, no pretibial edema. Jims sign negative. No discoloration or ulcerations. NEUROLOGIC:  The patient is conscious, alert, oriented, No apparent focal sensory or motor deficits.                       PROVISIONAL DIAGNOSES / SURGERY:      NON HEALING  LEFT ABDOMINAL WOUND  WOUND EXPLORATION W/WOUND VAC PLACEMENT / I&D Left    Patient Active Problem List    Diagnosis Date Noted    Osteopenia 12/16/2014     Priority: High    S/P partial gastrectomy 12/16/2014     Priority: High    S/P abdominoplasty 12/16/2014     Priority: High    Obesity, morbid, BMI 40.0-49.9 ([facility] [address] 12/16/2014     Priority: High    HTN (hypertension) 07/09/2014     Priority: High    Hypothyroid 12/16/2014     Priority: Medium    Depression 12/16/2014     Priority: Medium    Osteoporosis ( Mother) 12/16/2014     Priority: Medium    Asthma 12/16/2014     Priority: Medium    Rectocele 3 12/16/2014     Priority: Medium    Vaginal enterocele 1 12/16/2014     Priority: Medium    NORMAN (stress urinary incontinence, female) 12/16/2014     Priority: Medium    Sleep apnea 07/09/2014     Priority: Medium    GERD (gastroesophageal reflux disease) 07/09/2014     Priority: Medium    Vision abnormalities      Priority: Low    Abdominal wall ulcer, with fat layer exposed ([facility] [address] 07/31/2018    Complete tear of right rotator cuff 06/11/2018    Prediabetes 07/19/2017    Chronic pain of right knee 07/14/2017    Open abdominal wall wound 10/07/2015 LORY HYLTON, APRN - CNP on 11/5/2018 at 8:41 AM

## 2018-11-05 NOTE — OP NOTE
OPERATIVE NOTE    DATE OF PROCEDURE: 11/5/2018     SURGEON:Jil Monge DO    Preoperative Diagnosis:  Non-healing abdominal wall wound with fistulous tract    Procecure: exploration of abdominal wall wound with excision of suture granuloma with wound vac placement    Postoperative diagnosis: same    Anesthesia: General    EBL: Minimal    Specimens: abdominal wall tissue with suture granuloma    Complications: None     The patient is a 61y.o. year old  female with the above preop diagnosis. The risks benefits and alternatives of excision are explained to the Patient and they consent. They are brought to the operating room and placed in the supine position. The area(s) is  prepped and draped in typical sterile fashion. The area is anesthetized with 0.5% buffered Marcaine. Incision is made over the tract and dissection is carried down to the fascia using cautery. A thick fistulous tract was identified and dissected to its origin at the fascia. It was opened along its length to identify a prolene suture and suture granuloma at its base. The suture was removed as well as the granuloma and tract and sent to pathology. The wound was copiously irrigated and 30cc marcaine injected into the soft tissue. The dermis is closed with a single 2-0 vicryl  Interrupted suture and wound vac placed into the open region of the wound. The black sponge was cut to fill the wound and the skin around the wound was draped with vac drape. Vac drape was applied over the sponge and a hole cut in the drape for the track pad. The vac was applied to suction and a good seal was obtained without leak. The patient tolerated the procedure well. The patient is awakened and taken to the recovery room in stable condition. The wound measured 10 cmx 5 cmx 4.5 cm.     Electronically signed by Alvino Ochoa DO on 11/5/2018 at 10:54 AM

## 2018-11-07 ENCOUNTER — HOSPITAL ENCOUNTER (OUTPATIENT)
Dept: WOUND CARE | Age: 63
Discharge: HOME | End: 2018-11-07
Payer: COMMERCIAL

## 2018-11-07 VITALS
TEMPERATURE: 98.4 F | DIASTOLIC BLOOD PRESSURE: 78 MMHG | HEART RATE: 77 BPM | HEIGHT: 61 IN | SYSTOLIC BLOOD PRESSURE: 114 MMHG | RESPIRATION RATE: 16 BRPM | WEIGHT: 202 LBS | BODY MASS INDEX: 38.14 KG/M2

## 2018-11-07 DIAGNOSIS — S31.109D OPEN WOUND OF ABDOMINAL WALL, SUBSEQUENT ENCOUNTER: Primary | Chronic | ICD-10-CM

## 2018-11-07 LAB — SURGICAL PATHOLOGY REPORT: NORMAL

## 2018-11-07 PROCEDURE — 97605 NEG PRS WND THER DME<=50SQCM: CPT

## 2018-11-07 PROCEDURE — 11042 DBRDMT SUBQ TIS 1ST 20SQCM/<: CPT

## 2018-11-07 PROCEDURE — 11045 DBRDMT SUBQ TISS EACH ADDL: CPT

## 2018-11-07 PROCEDURE — 6370000000 HC RX 637 (ALT 250 FOR IP): Performed by: SURGERY

## 2018-11-07 RX ORDER — HYDROCODONE BITARTRATE AND ACETAMINOPHEN 5; 325 MG/1; MG/1
1 TABLET ORAL EVERY 4 HOURS PRN
Qty: 12 TABLET | Refills: 0 | Status: SHIPPED | OUTPATIENT
Start: 2018-11-07 | End: 2018-11-10

## 2018-11-07 RX ORDER — LIDOCAINE HYDROCHLORIDE 40 MG/ML
SOLUTION TOPICAL ONCE
Status: COMPLETED | OUTPATIENT
Start: 2018-11-07 | End: 2018-11-07

## 2018-11-07 RX ADMIN — LIDOCAINE HYDROCHLORIDE 10 ML: 40 SOLUTION TOPICAL at 08:55

## 2018-11-07 ASSESSMENT — PAIN SCALES - GENERAL: PAINLEVEL_OUTOF10: 0

## 2018-11-07 NOTE — PROGRESS NOTES
EXPLORATION W/WOUND VAC PLACEMENT / I&D performed by Tye Smith DO at [address] [facility] ARTHROSCOP,SURG,W/ROTAT CUFF REPR Right 6/11/2018    SHOULDER ARTHROSCOPY ROTATOR CUFF REPAIR performed by Dm Flanagan MD at [address] [facility]    stomach stapling, [facility]    TONSILLECTOMY      3 y.o.    WISDOM TOOTH EXTRACTION         FAMILY HISTORY    Family History   Problem Relation Age of Onset    Diabetes Maternal Grandmother    Brandyn Bang COPD Mother     Rheum Arthritis Mother     Osteoarthritis Mother     Osteoporosis Mother     Other Mother         AAA    High Blood Pressure Mother     Glaucoma Father     Arthritis Father     Cancer Father         throat cancer twice        SOCIAL HISTORY    Social History   Substance Use Topics    Smoking status: Former Smoker     Quit date: 6/20/2013    Smokeless tobacco: Never Used    Alcohol use 2.4 oz/week     4 Standard drinks or equivalent per week      Comment: occasionally weekends       ALLERGIES    Allergies   Allergen Reactions    Nickel      In foods    Percocet [Oxycodone-Acetaminophen]        MEDICATIONS    Current Outpatient Prescriptions on File Prior to Encounter   Medication Sig Dispense Refill    HYDROcodone-acetaminophen (NORCO) 5-325 MG per tablet Take 1 tablet by mouth every 6 hours as needed for Pain for up to 3 days. Intended supply: 3 days. Take lowest dose possible to manage pain.  28 tablet 0    ondansetron (ZOFRAN) 4 MG tablet Take 1 tablet by mouth daily as needed for Nausea or Vomiting 20 tablet 0    Calcium Citrate-Vitamin D (CALCIUM CITRATE + D PO) Take by mouth daily      MAGNESIUM CHLORIDE PO Take by mouth daily      Thiamine HCl (VITAMIN B-1) 250 MG tablet Take 250 mg by mouth daily      bisoprolol-hydrochlorothiazide (ZIAC) 5-6.25 MG per tablet Take 1 tablet by mouth daily       Potassium 99 MG TABS Take 99 mg by mouth daily       Biotin 5000 MCG CAPS Take 5,000 mcg by AM   Change in Wound Size % (l*w) -55251 11/7/2018  9:08 AM   Distance Tunneling (cm) 4.3 cm 8/29/2018  9:06 AM   Tunneling Position ___ O'Clock 1 8/29/2018  9:06 AM   Wound Assessment Drainage;Red;Yellow 11/7/2018  8:45 AM   Drainage Amount Moderate 11/7/2018  8:45 AM   Drainage Description Serosanguinous 11/7/2018  8:45 AM   Odor None 11/7/2018  8:45 AM   Margins Defined edges 11/7/2018  8:45 AM   [place]-wound Assessment Clean;Dry; Intact 11/7/2018  8:45 AM   Red%Wound Bed 50 11/7/2018  8:45 AM   Yellow%Wound Bed 50 11/7/2018  8:45 AM   Culture Taken Yes 9/26/2018  9:23 AM   Number of days: 89       Incision 09/16/15 Abdomen Medial (Active)   Number of days: 1148       Incision 06/11/18 Shoulder Right (Active)   Number of days: 148       Percent of Wound(s)/Ulcer(s) Debrided: 100%    Total Surface Area Debrided:  21 sq cm       Diabetic/Pressure/Non Pressure Ulcers only:  Ulcer: N/A      Estimated Blood Loss:  Minimal    Hemostasis Achieved:  by pressure    Procedural Pain:  5  / 10     Post Procedural Pain:  5 / 10     Response to treatment:  Well tolerated by patient. Plan:     Treatment Note please see attached Discharge Instructions    Written patient dismissal instructions given to patient and signed by patient or POA. Discharge Instructions                 [id number]      Visit  Discharge Instructions / Physician Orders  DATE:11/7/18    Home Care: [facility]    SUPPLIES ORDERED THRU:    Wound Location:  ABD    Cleanse with:  Liquid antibacterial soap and water,rinse well                     Dressing Orders: KCI wound vac -drape with KCI film- fill with black foam set at 125mmhg continuous    Frequency: change M-W F           Bring supplies to wound care and we will complete dressing on Wed         Additional Orders: Increase protein to diet (meat, cheese, eggs, fish, peanut butter, nuts and beans)  Multivitamin daily  Minimal lifting   HYPERBARIC TREATMENT-

## 2018-11-10 LAB
CULTURE: NORMAL
DIRECT EXAM: NORMAL
DIRECT EXAM: NORMAL
Lab: NORMAL
SPECIMEN DESCRIPTION: NORMAL
STATUS: NORMAL

## 2018-11-14 ENCOUNTER — HOSPITAL ENCOUNTER (OUTPATIENT)
Dept: WOUND CARE | Age: 63
Discharge: HOME | End: 2018-11-14
Payer: COMMERCIAL

## 2018-11-14 VITALS
DIASTOLIC BLOOD PRESSURE: 73 MMHG | WEIGHT: 202 LBS | HEIGHT: 61 IN | HEART RATE: 62 BPM | TEMPERATURE: 97.4 F | RESPIRATION RATE: 18 BRPM | BODY MASS INDEX: 38.14 KG/M2 | SYSTOLIC BLOOD PRESSURE: 119 MMHG

## 2018-11-14 PROCEDURE — 6370000000 HC RX 637 (ALT 250 FOR IP): Performed by: SURGERY

## 2018-11-14 PROCEDURE — 97605 NEG PRS WND THER DME<=50SQCM: CPT

## 2018-11-14 PROCEDURE — 11042 DBRDMT SUBQ TIS 1ST 20SQCM/<: CPT

## 2018-11-14 RX ORDER — LIDOCAINE HYDROCHLORIDE 40 MG/ML
SOLUTION TOPICAL ONCE
Status: COMPLETED | OUTPATIENT
Start: 2018-11-14 | End: 2018-11-14

## 2018-11-14 RX ADMIN — LIDOCAINE HYDROCHLORIDE 10 ML: 40 SOLUTION TOPICAL at 08:37

## 2018-11-14 ASSESSMENT — PAIN DESCRIPTION - PAIN TYPE: TYPE: SURGICAL PAIN

## 2018-11-14 ASSESSMENT — PAIN DESCRIPTION - ONSET: ONSET: ON-GOING

## 2018-11-14 ASSESSMENT — PAIN SCALES - GENERAL: PAINLEVEL_OUTOF10: 1

## 2018-11-14 ASSESSMENT — PAIN DESCRIPTION - FREQUENCY: FREQUENCY: INTERMITTENT

## 2018-11-14 ASSESSMENT — PAIN DESCRIPTION - PROGRESSION: CLINICAL_PROGRESSION: NOT CHANGED

## 2018-11-14 ASSESSMENT — PAIN DESCRIPTION - DESCRIPTORS: DESCRIPTORS: ACHING;SORE

## 2018-11-14 ASSESSMENT — PAIN DESCRIPTION - LOCATION: LOCATION: ABDOMEN

## 2018-11-14 NOTE — PROGRESS NOTES
Negative Pressure    NAME:  Ruben Right OF BIRTH:  1955  MEDICAL RECORD NUMBER:  [id number]  DATE:  11/14/2018     Applied Negative Pressure to umbilical wound(s)/ulcer(s).  [x] Applied skin barrier prep to medardo-wound.  [x] Cut strips of plastic drape to picture frame wound so that medardo-wound is     covered with the drape.  [x] If bridging dressing to less prominent site, cover any intact skin that will come in contact with the Negative Pressure Therapy sponge, gauze or channel drain with plastic drape. The sponge should never touch intact skin.  [x] Cut sponge, gauze or channel drain to size which will fit into the wound/ulcer bed without being forced.  [x] Be sure the sponge is large enough to hold the entire round plastic flange which is attached to the tubing. Never allow flange to be larger than the sponge or it will produce suction damaging intact skin.  Total number of individual pieces of foam used within the wound bed: 1 black    [x] If bridging the dressing away from the primary site, be sure the bridge leads to a piece of sponge large enough to hold the entire flange without allowing any of the flange to overlap onto intact skin.  [x] Covered sponge, gauze or channel drain with plastic drape.  [x] Cut a hole in this plastic drape directly over the sponge the same size as the plastic drain tubing.  [x] Removed plastic liner from flange and apply it directly over the hole you cut.  [x] Removed the plastic cover from the flange.  [x] Attached the tubing to the wound/ulcer Negative Pressure Therapy and turn it on to be sure a vacuum is created and that there are no leaks.  [x] If air leaks occur, use plastic drape to patch them.  [] Secured Negative Pressure Therapy dressing with ace wrap loosely if located on an extremity. Maintain tubing outside of ace wrap. Tubing must not exert pressure on intact skin.     Applied per Didier Ambrosio
nearest emergency room.     AVS REVIEWED    YES [x]     Patient Signature:__________________________________________________  Electronically signed by Miranda Perez RN on 11/14/2018 at 8:51 AM   Electronically signed by Don Steiner DO on 11/14/2018 at 8:53 AM            Electronically signed by Don Steiner DO on 11/14/2018 at 8:53 AM

## 2018-11-21 ENCOUNTER — HOSPITAL ENCOUNTER (OUTPATIENT)
Dept: WOUND CARE | Age: 63
Discharge: HOME | End: 2018-11-21
Payer: COMMERCIAL

## 2018-11-21 VITALS
RESPIRATION RATE: 18 BRPM | WEIGHT: 198 LBS | BODY MASS INDEX: 37.38 KG/M2 | HEART RATE: 66 BPM | SYSTOLIC BLOOD PRESSURE: 116 MMHG | TEMPERATURE: 97.5 F | HEIGHT: 61 IN | DIASTOLIC BLOOD PRESSURE: 78 MMHG

## 2018-11-21 PROCEDURE — 11043 DBRDMT MUSC&/FSCA 1ST 20/<: CPT

## 2018-11-21 PROCEDURE — 97605 NEG PRS WND THER DME<=50SQCM: CPT

## 2018-11-21 PROCEDURE — 6370000000 HC RX 637 (ALT 250 FOR IP): Performed by: SURGERY

## 2018-11-21 RX ORDER — LIDOCAINE HYDROCHLORIDE 40 MG/ML
SOLUTION TOPICAL ONCE
Status: COMPLETED | OUTPATIENT
Start: 2018-11-21 | End: 2018-11-21

## 2018-11-21 RX ADMIN — LIDOCAINE HYDROCHLORIDE 10 ML: 40 SOLUTION TOPICAL at 09:19

## 2018-11-21 ASSESSMENT — PAIN SCALES - GENERAL: PAINLEVEL_OUTOF10: 0

## 2018-11-21 NOTE — PROGRESS NOTES
Measurements:  Are located in the [facility]  Documentation Flow Sheet    Wound/Ulcer #: 4    Post Debridement Measurements:  Wound/Ulcer Descriptions are Pre Debridement except measurements:    Wound 08/09/18 Other (Comment) Umbilicus Other (Comment) Wound # 4 Umbilicus,new today. (Active)   Wound Type Wound 11/14/2018  8:36 AM   Wound Other 11/14/2018  8:36 AM   Dressing Status Old drainage 11/14/2018  8:36 AM   Dressing Changed Changed/New 11/14/2018  8:36 AM   Wound Cleansed Rinsed/Irrigated with saline 11/14/2018  8:36 AM   Wound Length (cm) 3 cm 11/21/2018  9:28 AM   Wound Width (cm) 6.5 cm 11/21/2018  9:28 AM   Wound Depth (cm)  4.2 11/21/2018  9:28 AM   Calculated Wound Size (cm^2) (l*w) 19.5 cm^2 11/21/2018  9:28 AM   Change in Wound Size % (l*w) -9650 11/21/2018  9:28 AM   Distance Tunneling (cm) 4.3 cm 8/29/2018  9:06 AM   Tunneling Position ___ O'Clock 1 8/29/2018  9:06 AM   Wound Assessment Red;Yellow 11/21/2018  9:10 AM   Drainage Amount Moderate 11/21/2018  9:10 AM   Drainage Description Serosanguinous 11/21/2018  9:10 AM   Odor None 11/21/2018  9:10 AM   Margins Defined edges 11/21/2018  9:10 AM   [place]-wound Assessment Red; Other (Comment) 11/21/2018  9:10 AM   Red%Wound Bed 80 11/21/2018  9:10 AM   Yellow%Wound Bed 20 11/21/2018  9:10 AM   Culture Taken Yes 9/26/2018  9:23 AM   Number of days: 103          Percent of Wound(s)/Ulcer(s) Debrided: 100%    Total Surface Area Debrided:  19.5 sq cm       Diabetic/Pressure/Non Pressure Ulcers only:  Ulcer: N/A      Estimated Blood Loss:  Minimal    Hemostasis Achieved:  by pressure    Procedural Pain:  3  / 10     Post Procedural Pain:  0 / 10     Response to treatment:  Well tolerated by patient. Plan:     Treatment Note please see attached Discharge Instructions    Written patient dismissal instructions given to patient and signed by patient or POA.          Discharge Instructions         [facility] WOUND and HYPERBARIC TREATMENT

## 2018-11-30 ENCOUNTER — HOSPITAL ENCOUNTER (OUTPATIENT)
Dept: WOUND CARE | Age: 63
Discharge: HOME | End: 2018-11-30
Payer: COMMERCIAL

## 2018-11-30 VITALS
HEART RATE: 74 BPM | DIASTOLIC BLOOD PRESSURE: 78 MMHG | HEIGHT: 61 IN | WEIGHT: 198 LBS | BODY MASS INDEX: 37.38 KG/M2 | RESPIRATION RATE: 18 BRPM | TEMPERATURE: 97.1 F | SYSTOLIC BLOOD PRESSURE: 114 MMHG

## 2018-11-30 DIAGNOSIS — L98.492 ABDOMINAL WALL ULCER, WITH FAT LAYER EXPOSED (HCC): Primary | ICD-10-CM

## 2018-11-30 DIAGNOSIS — S31.109D OPEN WOUND OF ABDOMINAL WALL, SUBSEQUENT ENCOUNTER: Chronic | ICD-10-CM

## 2018-11-30 PROCEDURE — 6370000000 HC RX 637 (ALT 250 FOR IP): Performed by: SURGERY

## 2018-11-30 PROCEDURE — 11043 DBRDMT MUSC&/FSCA 1ST 20/<: CPT

## 2018-11-30 RX ORDER — LIDOCAINE HYDROCHLORIDE 40 MG/ML
SOLUTION TOPICAL ONCE
Status: COMPLETED | OUTPATIENT
Start: 2018-11-30 | End: 2018-11-30

## 2018-11-30 RX ADMIN — LIDOCAINE HYDROCHLORIDE 10 ML: 40 SOLUTION TOPICAL at 14:34

## 2018-11-30 ASSESSMENT — PAIN SCALES - GENERAL: PAINLEVEL_OUTOF10: 0

## 2018-11-30 NOTE — PROGRESS NOTES
MIN     (Please note your next appointment above and if you are unable to keep, kindly give a 24 hour notice.  Thank you.)     If you experience any of the following, please call the [address] during business hours:  [phone]     * Increase in Pain  * Temperature over 101  * Increase in drainage from your wound  * Drainage with a foul odor  * Bleeding  * Increase in swelling  * Need for compression bandage changes due to slippage, breakthrough drainage.     If you need medical attention outside of the business hours of the [address] please contact your PCP or go to the nearest emergency room.     AVS REVIEWED    YES [x]     Patient Signature:__________________________________________________  Electronically signed by Dana Ocampo RN on 11/30/2018 at 2:39 PM  Electronically signed by Gavi Macedo MD on 11/30/2018 at 3:03 PM          Electronically signed by Gavi Macedo MD on 11/30/2018 at 3:05 PM

## 2018-11-30 NOTE — DISCHARGE INSTR - COC
2:24 PM   Dressing Changed Changed/New 11/30/2018  2:24 PM   Wound Cleansed Rinsed/Irrigated with saline 11/30/2018  2:24 PM   Wound Length (cm) 3 cm 11/30/2018  2:24 PM   Wound Width (cm) 6 cm 11/30/2018  2:24 PM   Wound Depth (cm) 4.3 cm 11/30/2018  2:24 PM   Wound Surface Area (cm^2) 18 cm^2 11/30/2018  2:24 PM   Wound Volume (cm^3) 77.4 cm^3 11/30/2018  2:24 PM   Post-Procedure Length (cm) 2.5 cm 11/30/2018  2:24 PM   Post-Procedure Width (cm) 6 cm 11/30/2018  2:24 PM   Post-Procedure Depth (cm) 3.5 cm 11/30/2018  2:24 PM   Post-Procedure Surface Area (cm^2) 15 cm^2 11/30/2018  2:24 PM   Post-Procedure Volume (cm^3) 52.5 cm^3 11/30/2018  2:24 PM   Wound Assessment Drainage;Granulation tissue;Red;Yellow;Slough 11/30/2018  2:24 PM   Drainage Amount Moderate 11/30/2018  2:24 PM   Drainage Description Serosanguinous 11/30/2018  2:24 PM   Odor None 11/30/2018  2:24 PM   Margins Defined edges 11/30/2018  2:24 PM   [place]-wound Assessment Clean;Dry;Red 11/30/2018  2:24 PM   Red%Wound Bed 90 11/30/2018  2:24 PM   Yellow%Wound Bed 10 11/30/2018  2:24 PM   Number of days: 113        Elimination:  Continence:   · Bowel: {YES / VU:70951}  · Bladder: {YES / HB:96511}  Urinary Catheter: {Urinary Catheter:554166447}   Colostomy/Ileostomy/Ileal Conduit: {YES / VW:91724}       Date of Last BM: ***  No intake or output data in the 24 hours ending 11/30/18 1503  No intake/output data recorded.     Safety Concerns:     [employer] Safety Concerns:332236252}    Impairments/Disabilities:      [employer] Impairments/Disabilities:875001822}    Nutrition Therapy:  Current Nutrition Therapy:   [employer] Diet List:572127440}    Routes of Feeding: {CHP DME Other Feedings:927661072}  Liquids: {Slp liquid thickness:37635}  Daily Fluid Restriction: {CHP DME Yes amt example:521357860}  Last Modified Barium Swallow with Video (Video Swallowing Test): {Done Not Done NNUQ:822590237}    Treatments at the Time of Hospital Discharge:   Respiratory Treatments:

## 2018-12-03 LAB
CULTURE: NORMAL
Lab: NORMAL
SPECIMEN DESCRIPTION: NORMAL
STATUS: NORMAL

## 2018-12-05 ENCOUNTER — HOSPITAL ENCOUNTER (OUTPATIENT)
Dept: WOUND CARE | Age: 63
Discharge: HOME | End: 2018-12-05
Payer: COMMERCIAL

## 2018-12-05 VITALS
TEMPERATURE: 96.9 F | SYSTOLIC BLOOD PRESSURE: 140 MMHG | BODY MASS INDEX: 37.38 KG/M2 | HEIGHT: 61 IN | DIASTOLIC BLOOD PRESSURE: 91 MMHG | WEIGHT: 198 LBS | RESPIRATION RATE: 18 BRPM | HEART RATE: 64 BPM

## 2018-12-05 PROCEDURE — 11043 DBRDMT MUSC&/FSCA 1ST 20/<: CPT

## 2018-12-05 RX ORDER — LIDOCAINE HYDROCHLORIDE 40 MG/ML
SOLUTION TOPICAL ONCE
Status: DISCONTINUED | OUTPATIENT
Start: 2018-12-05 | End: 2018-12-06 | Stop reason: HOSPADM

## 2018-12-05 ASSESSMENT — PAIN SCALES - GENERAL: PAINLEVEL_OUTOF10: 0

## 2018-12-05 NOTE — PLAN OF CARE
Problem: Pain:  Goal: Pain level will decrease  Pain level will decrease   Outcome: Ongoing    Goal: Control of acute pain  Control of acute pain   Outcome: Ongoing    Goal: Control of chronic pain  Control of chronic pain   Outcome: Ongoing      Problem: Wound:  Goal: Will show signs of wound healing; wound closure and no evidence of infection  Will show signs of wound healing; wound closure and no evidence of infection   Outcome: Ongoing      Problem: Falls - Risk of:  Goal: Will remain free from falls  Will remain free from falls   Outcome: Ongoing

## 2018-12-05 NOTE — PROGRESS NOTES
taken: Yes    Pain Control: Anesthetic  Anesthetic: 4% Lidocaine Liquid Topical       Debridement:Excisional Debridement    Using curette the wound(s)/ulcer(s) was/were sharply debrided down through and including the removal of subcutaneous tissue and muscle/fascia. Devitalized Tissue Debrided:  fibrin, biofilm and slough    Pre Debridement Measurements:  Are located in the [place]  Documentation Flow Sheet    Wound/Ulcer #: 4    Post Debridement Measurements:  Wound/Ulcer Descriptions are Pre Debridement except measurements:    Wound 88/03/11 Umbilicus #4  (Active)   Wound Image   11/30/2018  2:24 PM   Wound Other 12/5/2018  8:29 AM   Dressing Status Old drainage 12/5/2018  8:29 AM   Dressing Changed Changed/New 12/5/2018  8:29 AM   Wound Cleansed Rinsed/Irrigated with saline 12/5/2018  8:29 AM   Wound Length (cm) 1.5 cm 12/5/2018  8:29 AM   Wound Width (cm) 5.2 cm 12/5/2018  8:29 AM   Wound Depth (cm) 4 cm 12/5/2018  8:29 AM   Wound Surface Area (cm^2) 7.8 cm^2 12/5/2018  8:29 AM   Change in Wound Size % (l*w) 56.67 12/5/2018  8:29 AM   Wound Volume (cm^3) 31.2 cm^3 12/5/2018  8:29 AM   Wound Healing % 60 12/5/2018  8:29 AM   Post-Procedure Length (cm) 2 cm 12/5/2018  8:29 AM   Post-Procedure Width (cm) 5 cm 12/5/2018  8:29 AM   Post-Procedure Depth (cm) 2.3 cm 12/5/2018  8:29 AM   Post-Procedure Surface Area (cm^2) 10 cm^2 12/5/2018  8:29 AM   Post-Procedure Volume (cm^3) 23 cm^3 12/5/2018  8:29 AM   Undermining Starts ___ O'Clock 12 12/5/2018  8:29 AM   Undermining Ends___ O'Clock 1 12/5/2018  8:29 AM   Undermining Maxium Distance (cm) 12 12/5/2018  8:29 AM   Wound Assessment Granulation tissue 12/5/2018  8:29 AM   Drainage Amount Moderate 12/5/2018  8:29 AM   Drainage Description Serosanguinous; Yellow 12/5/2018  8:29 AM   Odor None 12/5/2018  8:29 AM   Margins Defined edges 12/5/2018  8:29 AM   [place]-wound Assessment Pink 12/5/2018  8:29 AM   [place]%Wound Bed 90 12/5/2018  8:29 AM   Red%Wound Bed 90

## 2018-12-12 ENCOUNTER — HOSPITAL ENCOUNTER (OUTPATIENT)
Dept: WOUND CARE | Age: 63
Discharge: HOME | End: 2018-12-12
Payer: COMMERCIAL

## 2018-12-12 VITALS
WEIGHT: 198 LBS | HEIGHT: 61 IN | HEART RATE: 64 BPM | SYSTOLIC BLOOD PRESSURE: 115 MMHG | DIASTOLIC BLOOD PRESSURE: 72 MMHG | BODY MASS INDEX: 37.38 KG/M2 | TEMPERATURE: 98.2 F | RESPIRATION RATE: 20 BRPM

## 2018-12-12 PROCEDURE — 11042 DBRDMT SUBQ TIS 1ST 20SQCM/<: CPT

## 2018-12-12 PROCEDURE — 6370000000 HC RX 637 (ALT 250 FOR IP): Performed by: SURGERY

## 2018-12-12 RX ORDER — LIDOCAINE HYDROCHLORIDE 40 MG/ML
SOLUTION TOPICAL ONCE
Status: COMPLETED | OUTPATIENT
Start: 2018-12-12 | End: 2018-12-12

## 2018-12-12 RX ADMIN — LIDOCAINE HYDROCHLORIDE: 40 SOLUTION TOPICAL at 11:25

## 2018-12-12 ASSESSMENT — PAIN SCALES - GENERAL: PAINLEVEL_OUTOF10: 0

## 2018-12-19 ENCOUNTER — HOSPITAL ENCOUNTER (OUTPATIENT)
Dept: WOUND CARE | Age: 63
Discharge: HOME | End: 2018-12-19
Payer: COMMERCIAL

## 2018-12-19 VITALS
TEMPERATURE: 96.9 F | BODY MASS INDEX: 37.38 KG/M2 | DIASTOLIC BLOOD PRESSURE: 78 MMHG | WEIGHT: 198 LBS | HEART RATE: 57 BPM | SYSTOLIC BLOOD PRESSURE: 119 MMHG | HEIGHT: 61 IN | RESPIRATION RATE: 18 BRPM

## 2018-12-19 PROCEDURE — 11042 DBRDMT SUBQ TIS 1ST 20SQCM/<: CPT

## 2018-12-19 RX ORDER — LIDOCAINE HYDROCHLORIDE 40 MG/ML
SOLUTION TOPICAL ONCE
Status: DISCONTINUED | OUTPATIENT
Start: 2018-12-19 | End: 2018-12-20 | Stop reason: HOSPADM

## 2018-12-19 ASSESSMENT — PAIN SCALES - GENERAL: PAINLEVEL_OUTOF10: 0

## 2018-12-19 NOTE — PROGRESS NOTES
to the nearest emergency room.     AVS REVIEWED    YES [x]     Patient Signature:__________________________________________________Date:_________  Electronically signed by Toñito Anderson RN on 12/19/2018 at 8:20 AM  Electronically signed by Diana Fitzgerald DO on 12/19/2018 at 8:42 AM          Electronically signed by Diana Fitzgerald DO on 12/19/2018 at 8:42 AM

## 2018-12-20 ENCOUNTER — OFFICE VISIT (OUTPATIENT)
Dept: ORTHOPEDIC SURGERY | Age: 63
End: 2018-12-20
Payer: COMMERCIAL

## 2018-12-20 VITALS — WEIGHT: 197 LBS | HEIGHT: 61 IN | BODY MASS INDEX: 37.19 KG/M2 | HEART RATE: 88 BPM

## 2018-12-20 DIAGNOSIS — Z98.890 S/P ROTATOR CUFF REPAIR: Primary | ICD-10-CM

## 2018-12-20 PROCEDURE — 99213 OFFICE O/P EST LOW 20 MIN: CPT | Performed by: ORTHOPAEDIC SURGERY

## 2019-04-16 RX ORDER — ALENDRONATE SODIUM 70 MG/1
70 TABLET ORAL
Qty: 4 TABLET | Refills: 3 | Status: SHIPPED | OUTPATIENT
Start: 2019-04-16 | End: 2019-06-03 | Stop reason: SDUPTHER

## 2019-05-07 ENCOUNTER — HOSPITAL ENCOUNTER (OUTPATIENT)
Age: 64
Setting detail: SPECIMEN
Discharge: HOME | End: 2019-05-07
Payer: COMMERCIAL

## 2019-05-07 PROCEDURE — 87205 SMEAR GRAM STAIN: CPT

## 2019-05-07 PROCEDURE — 86403 PARTICLE AGGLUT ANTBDY SCRN: CPT

## 2019-05-07 PROCEDURE — 87070 CULTURE OTHR SPECIMN AEROBIC: CPT

## 2019-05-09 LAB
CULTURE: ABNORMAL
DIRECT EXAM: ABNORMAL
DIRECT EXAM: ABNORMAL
Lab: ABNORMAL
SPECIMEN DESCRIPTION: ABNORMAL

## 2019-06-03 ENCOUNTER — OFFICE VISIT (OUTPATIENT)
Dept: OBGYN CLINIC | Age: 64
End: 2019-06-03
Payer: COMMERCIAL

## 2019-06-03 VITALS
BODY MASS INDEX: 37 KG/M2 | WEIGHT: 196 LBS | HEART RATE: 76 BPM | HEIGHT: 61 IN | SYSTOLIC BLOOD PRESSURE: 104 MMHG | DIASTOLIC BLOOD PRESSURE: 62 MMHG

## 2019-06-03 DIAGNOSIS — Z12.39 SCREENING FOR BREAST CANCER: ICD-10-CM

## 2019-06-03 DIAGNOSIS — Z01.419 WELL FEMALE EXAM WITH ROUTINE GYNECOLOGICAL EXAM: Primary | ICD-10-CM

## 2019-06-03 DIAGNOSIS — Z11.51 SCREENING FOR HPV (HUMAN PAPILLOMAVIRUS): ICD-10-CM

## 2019-06-03 PROCEDURE — 99396 PREV VISIT EST AGE 40-64: CPT | Performed by: NURSE PRACTITIONER

## 2019-06-03 RX ORDER — ALENDRONATE SODIUM 70 MG/1
70 TABLET ORAL
Qty: 12 TABLET | Refills: 4 | Status: SHIPPED | OUTPATIENT
Start: 2019-06-03 | End: 2020-09-22 | Stop reason: SDUPTHER

## 2019-06-03 ASSESSMENT — PATIENT HEALTH QUESTIONNAIRE - PHQ9
1. LITTLE INTEREST OR PLEASURE IN DOING THINGS: 0
2. FEELING DOWN, DEPRESSED OR HOPELESS: 0
SUM OF ALL RESPONSES TO PHQ QUESTIONS 1-9: 0
SUM OF ALL RESPONSES TO PHQ9 QUESTIONS 1 & 2: 0
SUM OF ALL RESPONSES TO PHQ QUESTIONS 1-9: 0

## 2019-06-03 NOTE — PROGRESS NOTES
 COLONOSCOPY      negative, repeat in 10 years    ENDOSCOPY, COLON, DIAGNOSTIC      EYE SURGERY      see below other history    GLAUCOMA SURGERY  2007    KNEE ARTHROSCOPY Right     torn meniscus    AR EXPLORE WOUND,ABDOMEN/FLANK/BACK Left 2018    WOUND EXPLORATION W/WOUND VAC PLACEMENT / I&D performed by Latrell Carpenter DO at [facility] ARTHROSCOP,SURG,W/ROTAT CUFF REPR Right 2018    SHOULDER ARTHROSCOPY ROTATOR CUFF REPAIR performed by Darius Roberts MD at [address] [facility]    stomach stapling, [facility]    TONSILLECTOMY      1 y.o.    WISDOM TOOTH EXTRACTION       Family History   Problem Relation Age of Onset    Diabetes Maternal Grandmother    [facility] COPD Mother     Rheum Arthritis Mother     Osteoarthritis Mother     Osteoporosis Mother     Other Mother         AAA    High Blood Pressure Mother     Glaucoma Father     Arthritis Father     Cancer Father         throat cancer twice      Social History     Socioeconomic History    Marital status:      Spouse name: Not on file    Number of children: Not on file    Years of education: Not on file    Highest education level: Not on file   Occupational History    Not on file   Social Needs    Financial resource strain: Not on file    Food insecurity:     Worry: Not on file     Inability: Not on file    Transportation needs:     Medical: Not on file     Non-medical: Not on file   Tobacco Use    Smoking status: Former Smoker     Last attempt to quit: 2013     Years since quittin.9    Smokeless tobacco: Never Used   Substance and Sexual Activity    Alcohol use:  Yes     Alcohol/week: 2.4 oz     Types: 4 Standard drinks or equivalent per week     Comment: occasionally weekends    Drug use: No    Sexual activity: Yes     Partners: Male     Birth control/protection: Post-menopausal   Lifestyle    Physical activity:     Days per week: Not on file Minutes per session: Not on file    Stress: Not on file   Relationships    Social connections:     Talks on phone: Not on file     Gets together: Not on file     Attends Orthodox service: Not on file     Active member of club or organization: Not on file     Attends meetings of clubs or organizations: Not on file     Relationship status: Not on file    Intimate partner violence:     Fear of current or ex partner: Not on file     Emotionally abused: Not on file     Physically abused: Not on file     Forced sexual activity: Not on file   Other Topics Concern    Not on file   Social History Narrative    Not on file       MEDICATIONS:  Current Outpatient Medications   Medication Sig Dispense Refill    alendronate (FOSAMAX) 70 MG tablet Take 1 tablet by mouth every 7 days 12 tablet 4    Calcium Citrate-Vitamin D (CALCIUM CITRATE + D PO) Take by mouth daily      MAGNESIUM CHLORIDE PO Take by mouth daily      Thiamine HCl (VITAMIN B-1) 250 MG tablet Take 250 mg by mouth daily      bisoprolol-hydrochlorothiazide (ZIAC) 5-6.25 MG per tablet Take 1 tablet by mouth daily       Potassium 99 MG TABS Take 99 mg by mouth daily       Biotin 5000 MCG CAPS Take 5,000 mcg by mouth daily       Ascorbic Acid (VITAMIN C) 1000 MG tablet Take 1,000 mg by mouth daily      Cholecalciferol (VITAMIN D3) 2000 units CAPS Take by mouth daily       docusate sodium (COLACE) 100 MG capsule Take 1 capsule by mouth daily as needed for Constipation 20 capsule 0    SYMBICORT 160-4.5 MCG/ACT AERO Inhale 2 puffs into the lungs 2 times daily       SYNTHROID 50 MCG tablet Take 50 mcg by mouth Daily       omeprazole (PRILOSEC) 20 MG capsule Take 20 mg by mouth 2 times daily       Multiple Vitamins-Minerals (OCUVITE PO) Take by mouth daily       FLUoxetine (PROZAC) 20 MG capsule Take 20 mg by mouth daily.  losartan (COZAAR) 50 MG tablet Take 50 mg by mouth 2 times daily.       hydrOXYzine (ATARAX) 25 MG tablet Take 25 mg by mouth daily.      VENTOLIN  (90 BASE) MCG/ACT inhaler Inhale 2 puffs into the lungs every 6 hours as needed        No current facility-administered medications for this visit. ALLERGIES:  Allergies as of 06/03/2019 - Review Complete 06/03/2019   Allergen Reaction Noted    Nickel  07/09/2014    Percocet [oxycodone-acetaminophen]  07/09/2014       Symptoms of decreased mood absent  Symptoms of anhedonia absent    **If either question is answered in a  positive fashion then complete the PHQ9 Scoring Evaluation and make the appropriate referral**      Immunization status: stated as current, but no records available. Gynecologic History:  Menarche: 6 yo  Menopause at 61 yo     Patient's last menstrual period was 07/01/2010 (approximate). Sexually Active: Yes    STD History: No     Permanent Sterilization: No   Reversible Birth Control: No        Hormone Replacement Exposure: Yes - stopped a few years ago. Genetic Qualified Family History of Breast, Ovarian , Colon or Uterine Cancer: No     If YES see scanned worksheet. Preventative Health Testing:    Health Maintenance:  Health Maintenance Due   Topic Date Due    Pneumococcal 0-64 years Vaccine (1 of 1 - PPSV23) 07[birth year]/1961    DTaP/Tdap/Td vaccine (1 - Tdap) 07/11/1974    Shingles Vaccine (1 of 2) 07/11/2005    A1C test (Diabetic or Prediabetic)  07/18/2018    TSH testing  07/18/2018       Date of Last Pap Smear: 5/17/2016 neg/neg  Abnormal Pap Smear History: yes- 15 years ago or more- abnormal pap smear- colposcopy with DR Karena Cardenas- normal  Colposcopy History:   Date of Last Mammogram: 7/17/2017 neg  Date of Last Colonoscopy: 9 years ago- normal- per patient  Date of Last Bone Density:4/2/2018 osteopenia      ________________________________________________________________    REVIEW OF SYSTEMS:    yes   A minimum of an eleven point review of systems was completed.     Review Of Systems (11 point):  Constitutional: No fever, chills or malaise; No weight change or fatigue  Head and Eyes: No vision, Headache, Dizziness or trauma in last 12 months  ENT ROS: No hearing, Tinnitis, sinus or taste problems  Hematological and Lymphatic ROS:No Lymphoma, Von Willebrand's, Hemophillia or Bleeding History  Psych ROS: No Depression, Homicidal thoughts,suicidal thoughts, or anxiety  Breast ROS: No prior breast abnormalities or lumps  Respiratory ROS: No SOB, Pneumoniae,Cough, or Pulmonary Embolism History  Cardiovascular ROS: No Chest Pain with Exertion, Palpitations, Syncope, Edema, Arrhythmia+ hypertension  Gastrointestinal ROS: No Indigestion, Heartburn, Nausea, vomiting, Diarrhea, Constipation,or Bowel Changes; No Bloody Stools or melena  Genito-Urinary ROS: No Dysuria, Hematuria or Nocturia. No Urinary Incontinence or Vaginal Discharge  Musculoskeletal ROS: No Arthralgia, Arthritis,Gout,Osteoporosis or Rheumatism. Hx abdominoplasty  Neurological ROS: No CVA, Migraines, Epilepsy, Seizure Hx, or Limb Weakness. + hypothyroidism  Dermatological ROS: No Rash, Itching, Hives, Mole Changes or Cancer                                                                                                                                                                                                                                  PHYSICAL Exam:     Constitutional:  Vitals:    06/03/19 1236   BP: 104/62   Site: Right Upper Arm   Position: Sitting   Cuff Size: Large Adult   Pulse: 76   Weight: 196 lb (88.9 kg)   Height: 5' 1\" (1.549 m)         General Appearance: This  is a well Developed, well Nourished, well groomed female. Her BMI was reviewed. Nutritional decision making was discussed. Skin:  There was a Normal Inspection of the skin without rashes or lesions. There were no rashes. (Papular, Maculopapular, Hives, Pustular, Macular)     There were no lesions (Ulcers, Erythema, Abn.  Appearing Nevi)            Lymphatic:  No Lymph Nodes were Palpable in the neck age.        ASSESSMENT:      61 y.o. Annual   Diagnosis Orders   1. Well female exam with routine gynecological exam     2. Screening for HPV (human papillomavirus)     3.  Screening for breast cancer  CARINA DIGITAL SCREEN W CAD BILATERAL          Chief Complaint   Patient presents with    Gynecologic Exam          Past Medical History:   Diagnosis Date    Asthma     BMI 37.0-37.9, adult 11/30/2018    Depression     GERD (gastroesophageal reflux disease)     Glaucoma associated with anomalies of iris     had surgery, no eye drops    High blood triglycerides 02/21/2018    191 on     Hypertension     Hypothyroidism     Left elbow fracture     no surgery    Obesity     Open abdominal wall wound 10/7/2015    Osteopenia     Tendonitis     Unspecified sleep apnea     CPAP    Urinary incontinence     Vision abnormalities     glasses    Vitamin B1 deficiency     Vitamin D deficiency          Patient Active Problem List    Diagnosis Date Noted    Osteopenia 12/16/2014     Priority: High     Rt Hip 2013 Dexa      S/P partial gastrectomy 12/16/2014     Priority: High     1989      S/P abdominoplasty 12/16/2014     Priority: High    HTN (hypertension) 07/09/2014     Priority: High    Hypothyroid 12/16/2014     Priority: Medium    Depression 12/16/2014     Priority: Medium    Osteoporosis ( Mother) 12/16/2014     Priority: Medium     Last DEXA 2013      Asthma 12/16/2014     Priority: Medium    Rectocele 3 12/16/2014     Priority: Medium     Pessary Declined      Vaginal enterocele 1 12/16/2014     Priority: Medium    NORMAN (stress urinary incontinence, female) 12/16/2014     Priority: Medium     Urology Referral      Sleep apnea 07/09/2014     Priority: Medium    GERD (gastroesophageal reflux disease) 07/09/2014     Priority: Medium    Vision abnormalities      Priority: Low     glasses      BMI 37.0-37.9, adult 11/30/2018    Abdominal wall ulcer, with fat layer exposed ([facility] [address] 07/31/2018    Complete tear of right rotator cuff 06/11/2018    Prediabetes 07/19/2017    Chronic pain of right knee 07/14/2017    Open abdominal wall wound 10/07/2015          Hereditary Breast, Ovarian, Colon and Uterine Cancer screening Done. Tobacco & Secondary smoke risks reviewed; instructed on cessation and avoidance      Counseling Completed:  Preventative Health Recommendations and Follow up. The patient was informed of the recommended preventative health recommendations. 1. Annuals every year; Cytology collections per prevailing guidelines. 2. Mammograms begin every year at 37 yo if no abnormalities are found and no family     History. 3. Bone density studies every 2-3 years. Begin at 71 yo. If no fracture history or osteoporosis family history. (significant). 4. Colonoscopy begin at 38 yo. Repeat every ten years if negative and no family history. 5. Calcium of 7602-9538 mg/day in split dosing  6. Vitamin D 400-800 IU/day  7. All other preventative health recommendations will be managed by the patients Primary care physician. PLAN:  Return in about 1 year (around 6/3/2020) for annual.   Mammogram ordered. Declines follow up with physician to discuss rectocele, cystocele. Denies symptoms. Repeat Annual every 1 year  Cervical Cytology Evaluation begins at 24years old. If Negative Cytology, Follow-up screening per current guidelines. Mammograms every 1 year. If 37 yo and last mammogram was negative. Calcium and Vitamin D dosing reviewed. Colonoscopy screening reviewed as well as onset for bone density testing. Birth control and barrier recommendations discussed. STD counseling and prevention reviewed. Gardisil counseling completed for all patients 10-37 yo. Routine health maintenance per patients PCP.   Orders Placed This Encounter   Procedures    CARINA DIGITAL SCREEN W CAD BILATERAL     Standing Status:   Future     Standing Expiration Date:   8/2/2020     Order Specific Question: Reason for exam:     Answer:   screening for breast cancer

## 2019-06-18 ENCOUNTER — OFFICE VISIT (OUTPATIENT)
Dept: ORTHOPEDIC SURGERY | Age: 64
End: 2019-06-18
Payer: COMMERCIAL

## 2019-06-18 DIAGNOSIS — Z98.890 STATUS POST ROTATOR CUFF REPAIR: Primary | ICD-10-CM

## 2019-06-18 PROCEDURE — 99213 OFFICE O/P EST LOW 20 MIN: CPT | Performed by: ORTHOPAEDIC SURGERY

## 2019-06-18 NOTE — PROGRESS NOTES
Procedure: Right shoulder arthroscopic rotator cuff repair  Date of procedure: 6/11/18    HPI: Tyler Benites is a 61 y.o.  right-hand dominant female who is approximately 1 year status post the aforementioned procedure. She indicates that she is still doing very well having really no pain in the shoulder. She report. She denies having any weakness or stiffness. The occasional rare soreness in the shoulder. She remains very cautious when she uses this arm but has returned to all of her regular activities. Previous treatment:    NSAIDs: Mobic    Physical Therapy:  Yes    Injections: None    Surgeries: See above    Review of Systems:     Constitution: no fever or chills   Pain level: 0/10  Musculoskeletal: As noted in the HPI   Neurologic: no neurologic symptoms    Past Medical History  Jadene Bumpers  has a past medical history of Asthma, BMI 37.0-37.9, adult, Depression, GERD (gastroesophageal reflux disease), Glaucoma associated with anomalies of iris, High blood triglycerides, Hypertension, Hypothyroidism, Left elbow fracture, Obesity, Open abdominal wall wound, Osteopenia, Tendonitis, Unspecified sleep apnea, Urinary incontinence, Vision abnormalities, Vitamin B1 deficiency, and Vitamin D deficiency. Past Surgical History  Jadene Bumpers  has a past surgical history that includes Cataract removal (Bilateral, 2007); Glaucoma surgery (2007); Cholecystectomy (2003); Bunionectomy (Bilateral, 2003); Rotator cuff repair (Right, 1998); Abdominoplasty (1995); Stomach surgery (1989); [place] tooth extraction; Tonsillectomy; Abscess Drainage (9-16-15); Colonoscopy (2012); Endoscopy, colon, diagnostic; eye surgery; Knee arthroscopy (Right, 2009); pr shldr arthroscop,surg,w/rotat cuff repr (Right, 6/11/2018); and pr explore wound,abdomen/flank/back (Left, 11/5/2018).     Current Medications  Current Outpatient Medications   Medication Sig Dispense Refill    alendronate (FOSAMAX) 70 MG tablet Take 1 tablet by mouth every 7 days 12 tablet 4    Calcium Citrate-Vitamin D (CALCIUM CITRATE + D PO) Take by mouth daily      MAGNESIUM CHLORIDE PO Take by mouth daily      Thiamine HCl (VITAMIN B-1) 250 MG tablet Take 250 mg by mouth daily      bisoprolol-hydrochlorothiazide (ZIAC) 5-6.25 MG per tablet Take 1 tablet by mouth daily       Potassium 99 MG TABS Take 99 mg by mouth daily       Biotin 5000 MCG CAPS Take 5,000 mcg by mouth daily       Ascorbic Acid (VITAMIN C) 1000 MG tablet Take 1,000 mg by mouth daily      Cholecalciferol (VITAMIN D3) 2000 units CAPS Take by mouth daily       VENTOLIN  (90 BASE) MCG/ACT inhaler Inhale 2 puffs into the lungs every 6 hours as needed       docusate sodium (COLACE) 100 MG capsule Take 1 capsule by mouth daily as needed for Constipation 20 capsule 0    SYMBICORT 160-4.5 MCG/ACT AERO Inhale 2 puffs into the lungs 2 times daily       SYNTHROID 50 MCG tablet Take 50 mcg by mouth Daily       omeprazole (PRILOSEC) 20 MG capsule Take 20 mg by mouth 2 times daily       Multiple Vitamins-Minerals (OCUVITE PO) Take by mouth daily       FLUoxetine (PROZAC) 20 MG capsule Take 20 mg by mouth daily.  losartan (COZAAR) 50 MG tablet Take 50 mg by mouth 2 times daily.  hydrOXYzine (ATARAX) 25 MG tablet Take 25 mg by mouth daily. No current facility-administered medications for this visit. Allergies  Allergies have been reviewed. Kimberly Nielsen is allergic to nickel and percocet [oxycodone-acetaminophen]. Social History  Kimberly Nielsen  reports that she quit smoking about 5 years ago. She has never used smokeless tobacco. She reports that she drinks about 2.4 oz of alcohol per week. She reports that she does not use drugs.     Family History  Madison's family history includes Arthritis in her father; COPD in her mother; Cancer in her father; Diabetes in her maternal grandmother; Glaucoma in her father; High Blood Pressure in her mother; Osteoarthritis in her mother; Osteoporosis in her mother; Other in her mother; Rheum Arthritis in her mother. Physical Exam:     LMP 07/01/2010 (Approximate)    General Appearance: alert, well appearing, and in no distress  Mental Status: alert, oriented to person, place, and time  Gait: normal    Shoulder:    Skin: warm and dry, no rash or erythema; no swelling or obvious muscular atrophy  Vasculature: 2+ radial pulses bilaterally  Neuro: Sensation grossly intact to light touch diffusely  Tenderness: Nontender to palpation    ROM: (Degrees)    Right   A P   Left   A P    Elevation  150 150   Elevation  150   Abduction  160 160   Abduction  165  ER   35 40   ER   35   IR   T10    IR   T10   90 abd/ER      90 abd/ER     90 abd/IR      90 abd/IR     Crepitation  No    Crepitation No  Dyskenesia  No    Dyskenesia No      Muscle strength:    Right       Left    Deltoid   5    Deltoid   5  Supraspinatus  5    Supraspinatus  5  ER   4+    ER   5  IR   5    IR   5    Special tests    Right   Rotator Cuff    Left    n   Painful arc    n   n   Pain with ER    n    n   Neer's     n    n   Hawkin's    n    n   Drop Arm    n  n   Lift off/Belly Press   n  n   ER Lag    n          AC Joint  n   AC tenderness   n  n   Cross-chest adduction  n       Labrum/biceps    n   [place]'s    n   n   Biceps sheer    n      n   Speed's/Yergason's   n    n   Tenderness Biceps Groove  n    n   Joaquin's    n         Instability  n   Ant Apprehension   n    n   Post Apprehension   n    n   Ant Load shift    n    n   Post Load shift   n   n   Sulcus     n  n   Generalized Laxity   n  n   Relocation test   n  n   Crank test     n  n   Donald-superior escape  n       Imaging:  None today    Impression/Plan:     Lucila Bailey is a 61 y.o. old female who is approximately 1 year status post right shoulder arthroscopic rotator cuff repair for massive tear. She is doing quite well as noted above. She is pleased with her outcome.   At this time she was encouraged to maintain some of her exercises from physical therapy as part of a home exercise program.  She may continue to use this extremity as tolerated and I will have her follow-up in my clinic as needed. She was encouraged to return or call at anytime with questions and/or concerns.         NA = Not assessed  RTC = Rotator cuff  RCT = Rotator cuff tear  ER = External rotation  IR = Internal rotation  AC = Acromioclavicular  GH = Glenohumeral  n = No  y = Yes

## 2019-06-21 ENCOUNTER — HOSPITAL ENCOUNTER (OUTPATIENT)
Dept: CT IMAGING | Age: 64
Discharge: HOME | End: 2019-06-23
Payer: COMMERCIAL

## 2019-06-21 DIAGNOSIS — T14.8XXD DELAYED WOUND HEALING: ICD-10-CM

## 2019-06-21 LAB
BUN BLDV-MCNC: 24 MG/DL (ref 8–23)
CREAT SERPL-MCNC: 0.79 MG/DL (ref 0.5–0.9)
GFR AFRICAN AMERICAN: >60 ML/MIN
GFR NON-AFRICAN AMERICAN: >60 ML/MIN
GFR SERPL CREATININE-BSD FRML MDRD: ABNORMAL ML/MIN/{1.73_M2}
GFR SERPL CREATININE-BSD FRML MDRD: ABNORMAL ML/MIN/{1.73_M2}

## 2019-06-21 PROCEDURE — 82565 ASSAY OF CREATININE: CPT

## 2019-06-21 PROCEDURE — 6360000004 HC RX CONTRAST MEDICATION: Performed by: SURGERY

## 2019-06-21 PROCEDURE — 84520 ASSAY OF UREA NITROGEN: CPT

## 2019-06-21 PROCEDURE — 2580000003 HC RX 258: Performed by: SURGERY

## 2019-06-21 PROCEDURE — 74177 CT ABD & PELVIS W/CONTRAST: CPT

## 2019-06-21 PROCEDURE — 36415 COLL VENOUS BLD VENIPUNCTURE: CPT

## 2019-06-21 RX ORDER — SODIUM CHLORIDE 0.9 % (FLUSH) 0.9 %
10 SYRINGE (ML) INJECTION PRN
Status: DISCONTINUED | OUTPATIENT
Start: 2019-06-21 | End: 2019-06-24 | Stop reason: HOSPADM

## 2019-06-21 RX ORDER — 0.9 % SODIUM CHLORIDE 0.9 %
80 INTRAVENOUS SOLUTION INTRAVENOUS ONCE
Status: COMPLETED | OUTPATIENT
Start: 2019-06-21 | End: 2019-06-21

## 2019-06-21 RX ADMIN — Medication 10 ML: at 14:06

## 2019-06-21 RX ADMIN — SODIUM CHLORIDE 80 ML: 9 INJECTION, SOLUTION INTRAVENOUS at 14:06

## 2019-06-21 RX ADMIN — IOHEXOL 50 ML: 240 INJECTION, SOLUTION INTRATHECAL; INTRAVASCULAR; INTRAVENOUS; ORAL at 14:06

## 2019-06-21 RX ADMIN — IOVERSOL 75 ML: 741 INJECTION INTRA-ARTERIAL; INTRAVENOUS at 14:06

## 2019-06-22 ENCOUNTER — HOSPITAL ENCOUNTER (OUTPATIENT)
Age: 64
Discharge: HOME | End: 2019-06-22
Payer: COMMERCIAL

## 2019-06-22 LAB
ABSOLUTE EOS #: 0.3 K/UL (ref 0–0.4)
ABSOLUTE IMMATURE GRANULOCYTE: ABNORMAL K/UL (ref 0–0.3)
ABSOLUTE LYMPH #: 1.7 K/UL (ref 1–4.8)
ABSOLUTE MONO #: 0.4 K/UL (ref 0.1–1.3)
ALBUMIN SERPL-MCNC: 3.8 G/DL (ref 3.5–5.2)
ALBUMIN/GLOBULIN RATIO: ABNORMAL (ref 1–2.5)
ALP BLD-CCNC: 76 U/L (ref 35–104)
ALT SERPL-CCNC: 20 U/L (ref 5–33)
ANION GAP SERPL CALCULATED.3IONS-SCNC: 13 MMOL/L (ref 9–17)
AST SERPL-CCNC: 21 U/L
BASOPHILS # BLD: 0 % (ref 0–2)
BASOPHILS ABSOLUTE: 0 K/UL (ref 0–0.2)
BILIRUB SERPL-MCNC: 0.16 MG/DL (ref 0.3–1.2)
BUN BLDV-MCNC: 20 MG/DL (ref 8–23)
BUN/CREAT BLD: ABNORMAL (ref 9–20)
CALCIUM SERPL-MCNC: 9.6 MG/DL (ref 8.6–10.4)
CHLORIDE BLD-SCNC: 105 MMOL/L (ref 98–107)
CHOLESTEROL/HDL RATIO: 4.3
CHOLESTEROL: 193 MG/DL
CO2: 24 MMOL/L (ref 20–31)
CREAT SERPL-MCNC: 0.78 MG/DL (ref 0.5–0.9)
DIFFERENTIAL TYPE: ABNORMAL
EOSINOPHILS RELATIVE PERCENT: 5 % (ref 0–4)
GFR AFRICAN AMERICAN: >60 ML/MIN
GFR NON-AFRICAN AMERICAN: >60 ML/MIN
GFR SERPL CREATININE-BSD FRML MDRD: ABNORMAL ML/MIN/{1.73_M2}
GFR SERPL CREATININE-BSD FRML MDRD: ABNORMAL ML/MIN/{1.73_M2}
GLUCOSE BLD-MCNC: 91 MG/DL (ref 70–99)
HCT VFR BLD CALC: 40.8 % (ref 36–46)
HDLC SERPL-MCNC: 45 MG/DL
HEMOGLOBIN: 13.6 G/DL (ref 12–16)
IMMATURE GRANULOCYTES: ABNORMAL %
LDL CHOLESTEROL: 109 MG/DL (ref 0–130)
LYMPHOCYTES # BLD: 34 % (ref 24–44)
MCH RBC QN AUTO: 31.1 PG (ref 26–34)
MCHC RBC AUTO-ENTMCNC: 33.3 G/DL (ref 31–37)
MCV RBC AUTO: 93.4 FL (ref 80–100)
MONOCYTES # BLD: 8 % (ref 1–7)
NRBC AUTOMATED: ABNORMAL PER 100 WBC
PDW BLD-RTO: 14.7 % (ref 11.5–14.9)
PLATELET # BLD: 210 K/UL (ref 150–450)
PLATELET ESTIMATE: ABNORMAL
PMV BLD AUTO: 8.1 FL (ref 6–12)
POTASSIUM SERPL-SCNC: 4.2 MMOL/L (ref 3.7–5.3)
RBC # BLD: 4.37 M/UL (ref 4–5.2)
RBC # BLD: ABNORMAL 10*6/UL
SEG NEUTROPHILS: 53 % (ref 36–66)
SEGMENTED NEUTROPHILS ABSOLUTE COUNT: 2.6 K/UL (ref 1.3–9.1)
SODIUM BLD-SCNC: 142 MMOL/L (ref 135–144)
THYROXINE, FREE: 1.38 NG/DL (ref 0.93–1.7)
TOTAL PROTEIN: 6.5 G/DL (ref 6.4–8.3)
TRIGL SERPL-MCNC: 197 MG/DL
TSH SERPL DL<=0.05 MIU/L-ACNC: 0.63 MIU/L (ref 0.3–5)
VITAMIN D 25-HYDROXY: 88.4 NG/ML (ref 30–100)
VLDLC SERPL CALC-MCNC: ABNORMAL MG/DL (ref 1–30)
WBC # BLD: 5 K/UL (ref 3.5–11)
WBC # BLD: ABNORMAL 10*3/UL

## 2019-06-22 PROCEDURE — 80061 LIPID PANEL: CPT

## 2019-06-22 PROCEDURE — 80053 COMPREHEN METABOLIC PANEL: CPT

## 2019-06-22 PROCEDURE — 84439 ASSAY OF FREE THYROXINE: CPT

## 2019-06-22 PROCEDURE — 85025 COMPLETE CBC W/AUTO DIFF WBC: CPT

## 2019-06-22 PROCEDURE — 82306 VITAMIN D 25 HYDROXY: CPT

## 2019-06-22 PROCEDURE — 36415 COLL VENOUS BLD VENIPUNCTURE: CPT

## 2019-06-22 PROCEDURE — 84443 ASSAY THYROID STIM HORMONE: CPT

## 2019-06-24 ENCOUNTER — HOSPITAL ENCOUNTER (OUTPATIENT)
Age: 64
Setting detail: SPECIMEN
Discharge: HOME | End: 2019-06-24
Payer: COMMERCIAL

## 2019-06-24 LAB
BILIRUBIN URINE: NEGATIVE
COLOR: YELLOW
COMMENT UA: NORMAL
GLUCOSE URINE: NEGATIVE
KETONES, URINE: NEGATIVE
LEUKOCYTE ESTERASE, URINE: NEGATIVE
NITRITE, URINE: NEGATIVE
PH UA: 5 (ref 5–8)
PROTEIN UA: NEGATIVE
SPECIFIC GRAVITY UA: 1.03 (ref 1–1.03)
TURBIDITY: CLEAR
URINE HGB: NEGATIVE
UROBILINOGEN, URINE: NORMAL

## 2019-06-24 PROCEDURE — 81003 URINALYSIS AUTO W/O SCOPE: CPT

## 2019-07-15 DIAGNOSIS — G56.01 CARPAL TUNNEL SYNDROME, RIGHT UPPER LIMB: Primary | ICD-10-CM

## 2019-07-25 ENCOUNTER — HOSPITAL ENCOUNTER (OUTPATIENT)
Dept: WOMENS IMAGING | Age: 64
Discharge: HOME | End: 2019-07-27
Payer: COMMERCIAL

## 2019-07-25 DIAGNOSIS — Z12.39 SCREENING FOR BREAST CANCER: ICD-10-CM

## 2019-07-25 PROCEDURE — 77063 BREAST TOMOSYNTHESIS BI: CPT

## 2019-09-14 ENCOUNTER — HOSPITAL ENCOUNTER (OUTPATIENT)
Age: 64
Discharge: HOME | End: 2019-09-14
Payer: COMMERCIAL

## 2019-09-14 LAB
HIGH SENSITIVE C-REACTIVE PROTEIN: 3.3 MG/L
RHEUMATOID FACTOR: <10 IU/ML
SEDIMENTATION RATE, ERYTHROCYTE: 6 MM (ref 0–20)
URIC ACID: 4.8 MG/DL (ref 2.4–5.7)

## 2019-09-14 PROCEDURE — 84550 ASSAY OF BLOOD/URIC ACID: CPT

## 2019-09-14 PROCEDURE — 86038 ANTINUCLEAR ANTIBODIES: CPT

## 2019-09-14 PROCEDURE — 86141 C-REACTIVE PROTEIN HS: CPT

## 2019-09-14 PROCEDURE — 85651 RBC SED RATE NONAUTOMATED: CPT

## 2019-09-14 PROCEDURE — 86431 RHEUMATOID FACTOR QUANT: CPT

## 2019-09-14 PROCEDURE — 36415 COLL VENOUS BLD VENIPUNCTURE: CPT

## 2019-09-16 LAB — ANTI-NUCLEAR ANTIBODY (ANA): NEGATIVE

## 2019-10-31 ENCOUNTER — HOSPITAL ENCOUNTER (OUTPATIENT)
Age: 64
Setting detail: SPECIMEN
Discharge: HOME | End: 2019-10-31
Payer: COMMERCIAL

## 2019-10-31 PROCEDURE — 88305 TISSUE EXAM BY PATHOLOGIST: CPT

## 2019-11-01 ENCOUNTER — HOSPITAL ENCOUNTER (OUTPATIENT)
Age: 64
Setting detail: SPECIMEN
Discharge: HOME | End: 2019-11-01
Payer: COMMERCIAL

## 2019-11-05 LAB — SURGICAL PATHOLOGY REPORT: NORMAL

## 2020-03-05 ENCOUNTER — TELEPHONE (OUTPATIENT)
Dept: ORTHOPEDIC SURGERY | Age: 65
End: 2020-03-05

## 2020-03-06 NOTE — TELEPHONE ENCOUNTER
EMG studies show bilateral carpal tunnel syndrome, Right more severe than left. Can be treated with night time wrist splinting for 2 months if she hasn't already tried this. Otherwise can come in to discuss treatment options including surgery.

## 2020-03-09 ENCOUNTER — OFFICE VISIT (OUTPATIENT)
Dept: ORTHOPEDIC SURGERY | Age: 65
End: 2020-03-09
Payer: COMMERCIAL

## 2020-03-09 VITALS — HEIGHT: 61 IN | WEIGHT: 195.99 LBS | BODY MASS INDEX: 37 KG/M2

## 2020-03-09 PROBLEM — G56.01 RIGHT CARPAL TUNNEL SYNDROME: Status: ACTIVE | Noted: 2020-03-09

## 2020-03-09 PROBLEM — M75.121 COMPLETE TEAR OF RIGHT ROTATOR CUFF: Status: RESOLVED | Noted: 2018-06-11 | Resolved: 2020-03-09

## 2020-03-09 PROCEDURE — 99213 OFFICE O/P EST LOW 20 MIN: CPT | Performed by: ORTHOPAEDIC SURGERY

## 2020-03-09 RX ORDER — MELOXICAM 15 MG/1
15 TABLET ORAL DAILY
COMMUNITY

## 2020-05-08 ENCOUNTER — HOSPITAL ENCOUNTER (OUTPATIENT)
Dept: PREADMISSION TESTING | Age: 65
Discharge: HOME | End: 2020-05-12
Payer: COMMERCIAL

## 2020-05-08 VITALS
HEART RATE: 76 BPM | OXYGEN SATURATION: 96 % | RESPIRATION RATE: 16 BRPM | HEIGHT: 61 IN | SYSTOLIC BLOOD PRESSURE: 107 MMHG | BODY MASS INDEX: 41.54 KG/M2 | WEIGHT: 220 LBS | TEMPERATURE: 97.8 F | DIASTOLIC BLOOD PRESSURE: 67 MMHG

## 2020-05-08 LAB
ABSOLUTE EOS #: 0.4 K/UL (ref 0–0.4)
ABSOLUTE IMMATURE GRANULOCYTE: ABNORMAL K/UL (ref 0–0.3)
ABSOLUTE LYMPH #: 3 K/UL (ref 1–4.8)
ABSOLUTE MONO #: 0.8 K/UL (ref 0.1–1.3)
ANION GAP SERPL CALCULATED.3IONS-SCNC: 13 MMOL/L (ref 9–17)
BASOPHILS # BLD: 0 % (ref 0–2)
BASOPHILS ABSOLUTE: 0 K/UL (ref 0–0.2)
BUN BLDV-MCNC: 24 MG/DL (ref 8–23)
BUN/CREAT BLD: ABNORMAL (ref 9–20)
CALCIUM SERPL-MCNC: 9.3 MG/DL (ref 8.6–10.4)
CHLORIDE BLD-SCNC: 104 MMOL/L (ref 98–107)
CO2: 25 MMOL/L (ref 20–31)
CREAT SERPL-MCNC: 0.76 MG/DL (ref 0.5–0.9)
DIFFERENTIAL TYPE: ABNORMAL
EOSINOPHILS RELATIVE PERCENT: 4 % (ref 0–4)
GFR AFRICAN AMERICAN: >60 ML/MIN
GFR NON-AFRICAN AMERICAN: >60 ML/MIN
GFR SERPL CREATININE-BSD FRML MDRD: ABNORMAL ML/MIN/{1.73_M2}
GFR SERPL CREATININE-BSD FRML MDRD: ABNORMAL ML/MIN/{1.73_M2}
GLUCOSE BLD-MCNC: 125 MG/DL (ref 70–99)
HCT VFR BLD CALC: 40.4 % (ref 36–46)
HEMOGLOBIN: 12.8 G/DL (ref 12–16)
IMMATURE GRANULOCYTES: ABNORMAL %
LYMPHOCYTES # BLD: 31 % (ref 24–44)
MCH RBC QN AUTO: 27.9 PG (ref 26–34)
MCHC RBC AUTO-ENTMCNC: 31.8 G/DL (ref 31–37)
MCV RBC AUTO: 87.9 FL (ref 80–100)
MONOCYTES # BLD: 8 % (ref 1–7)
NRBC AUTOMATED: ABNORMAL PER 100 WBC
PDW BLD-RTO: 15.9 % (ref 11.5–14.9)
PLATELET # BLD: 294 K/UL (ref 150–450)
PLATELET ESTIMATE: ABNORMAL
PMV BLD AUTO: 7.3 FL (ref 6–12)
POTASSIUM SERPL-SCNC: 4.1 MMOL/L (ref 3.7–5.3)
RBC # BLD: 4.6 M/UL (ref 4–5.2)
RBC # BLD: ABNORMAL 10*6/UL
SEG NEUTROPHILS: 57 % (ref 36–66)
SEGMENTED NEUTROPHILS ABSOLUTE COUNT: 5.4 K/UL (ref 1.3–9.1)
SODIUM BLD-SCNC: 142 MMOL/L (ref 135–144)
WBC # BLD: 9.6 K/UL (ref 3.5–11)
WBC # BLD: ABNORMAL 10*3/UL

## 2020-05-08 PROCEDURE — 85025 COMPLETE CBC W/AUTO DIFF WBC: CPT

## 2020-05-08 PROCEDURE — 80048 BASIC METABOLIC PNL TOTAL CA: CPT

## 2020-05-08 PROCEDURE — 36415 COLL VENOUS BLD VENIPUNCTURE: CPT

## 2020-05-08 PROCEDURE — 93005 ELECTROCARDIOGRAM TRACING: CPT | Performed by: ANESTHESIOLOGY

## 2020-05-08 ASSESSMENT — ENCOUNTER SYMPTOMS
WHEEZING: 0
SORE THROAT: 0
SHORTNESS OF BREATH: 0
SINUS PAIN: 0
DIARRHEA: 0
NAUSEA: 0
COUGH: 0
CONSTIPATION: 0
ABDOMINAL PAIN: 0
VOMITING: 0

## 2020-05-08 NOTE — H&P (VIEW-ONLY)
HISTORY and [id number]       NAME:  Farhat Arredondo  MRN: [id number]   YOB: 1955   Date: 5/8/2020   Age: 59 y.o. Gender: female       COMPLAINT AND PRESENT HISTORY:     Farhat Arredondo is 59 y.o. , female, Preadmission Testing for right open carpal tunnel release. Pt states she has been dealing with right wrist pain for 20 years and has been wearing a brace at night. She reports she is right handed. She has numbness and tingling into the right hand and fingers. She notes when she wakes in the morning her fingers are numb on the right hand. She has noticed increase in pain, throbbing aching in nature. She notices the pain is worse at night. Pain radiates into the right elbow. Other associated symptoms include: weakness, decreased  strength. Pt states she was supposed to have this surgery 4/9/2020 but had to be rescheduled due to the COVID 19 pandemic. No chest pain or SOB. No fever or chills. No N/V/D or constipation. No other somatic complaints today.        PAST MEDICAL HISTORY     Past Medical History:   Diagnosis Date    Asthma     BMI 37.0-37.9, adult 11/30/2018    Depression     GERD (gastroesophageal reflux disease)     Glaucoma associated with anomalies of iris     had surgery, no eye drops    High blood triglycerides 02/21/2018    191 on     Hypertension     Hypothyroidism     Left elbow fracture     no surgery    Obesity     Open abdominal wall wound 10/7/2015    Osteopenia     Tendonitis     Unspecified sleep apnea     CPAP    Urinary incontinence     Vision abnormalities     glasses    Vitamin B1 deficiency     Vitamin D deficiency        SURGICAL HISTORY       Past Surgical History:   Procedure Laterality Date    ABDOMINOPLASTY  1995    ABSCESS DRAINAGE  9-16-15    abd. wall abscess    BUNIONECTOMY Bilateral 2003    CATARACT REMOVAL Bilateral 2007    CHOLECYSTECTOMY  2003    COLONOSCOPY  2012    negative, repeat in 10 years    connections     Talks on phone: None     Gets together: None     Attends Synagogue service: None     Active member of club or organization: None     Attends meetings of clubs or organizations: None     Relationship status: None    Intimate partner violence     Fear of current or ex partner: None     Emotionally abused: None     Physically abused: None     Forced sexual activity: None   Other Topics Concern    None   Social History Narrative    None           REVIEW OF SYSTEMS      Allergies   Allergen Reactions    Nickel      In foods    Percocet [Oxycodone-Acetaminophen]        Current Outpatient Medications on File Prior to Encounter   Medication Sig Dispense Refill    meloxicam (MOBIC) 15 MG tablet Take 15 mg by mouth daily      alendronate (FOSAMAX) 70 MG tablet Take 1 tablet by mouth every 7 days (Patient taking differently: Take 70 mg by mouth every 7 days SUNDAYS) 12 tablet 4    Calcium Citrate-Vitamin D (CALCIUM CITRATE + D PO) Take by mouth daily      MAGNESIUM CHLORIDE PO Take by mouth daily      Thiamine HCl (VITAMIN B-1) 250 MG tablet Take 250 mg by mouth daily      bisoprolol-hydrochlorothiazide (ZIAC) 5-6.25 MG per tablet Take 1 tablet by mouth daily       Potassium 99 MG TABS Take 99 mg by mouth daily       Biotin 5000 MCG CAPS Take 5,000 mcg by mouth daily       Ascorbic Acid (VITAMIN C) 1000 MG tablet Take 1,000 mg by mouth daily      Cholecalciferol (VITAMIN D3) 2000 units CAPS Take by mouth daily       docusate sodium (COLACE) 100 MG capsule Take 1 capsule by mouth daily as needed for Constipation 20 capsule 0    SYMBICORT 160-4.5 MCG/ACT AERO Inhale 2 puffs into the lungs 2 times daily       SYNTHROID 50 MCG tablet Take 50 mcg by mouth Daily       omeprazole (PRILOSEC) 20 MG capsule Take 20 mg by mouth 2 times daily       Multiple Vitamins-Minerals (OCUVITE PO) Take by mouth daily       FLUoxetine (PROZAC) 20 MG capsule Take 20 mg by mouth daily.       losartan (COZAAR) 50 MG tablet Take 50 mg by mouth 2 times daily.  hydrOXYzine (ATARAX) 25 MG tablet Take 25 mg by mouth daily. No current facility-administered medications on file prior to encounter. Review of Systems   Constitutional: Negative for chills, fatigue and fever. HENT: Negative for congestion, postnasal drip, sinus pain and sore throat. Respiratory: Negative for cough, shortness of breath and wheezing. Cardiovascular: Negative for chest pain and palpitations. Gastrointestinal: Negative for abdominal pain, constipation, diarrhea, nausea and vomiting. Genitourinary: Negative for difficulty urinating, dysuria, frequency and urgency. Musculoskeletal: Positive for arthralgias and joint swelling. Wrist pain, right     Neurological: Negative for dizziness, weakness, numbness and headaches. Psychiatric/Behavioral: Negative. GENERAL PHYSICAL EXAM:     Vitals: /67   Pulse 76   Temp 97.8 °F (36.6 °C) (Temporal)   Resp 16   Ht 5' 1\" (1.549 m)   Wt 220 lb (99.8 kg)   LMP 07/01/2010 (Approximate)   SpO2 96%   BMI 41.57 kg/m²  Body mass index is 41.57 kg/m². GENERAL APPEARANCE:   Zenobia Kearney is 59 y.o.,  female, nourished, conscious, alert. Does not appear to be distress or pain at this time. Physical Exam   Constitutional: She is oriented to person, place, and time. Vital signs are normal. She appears well-developed. She is cooperative. No distress. HENT:   Head: Normocephalic and atraumatic. Right Ear: Hearing and external ear normal.   Left Ear: Hearing and external ear normal.   Nose: Nose normal.   Mouth/Throat: Uvula is midline, oropharynx is clear and moist and mucous membranes are normal.   Eyes: Pupils are equal, round, and reactive to light. Conjunctivae and lids are normal. No scleral icterus. Neck: Trachea normal. Neck supple. Cardiovascular: Normal rate, regular rhythm and normal heart sounds.   No extrasystoles are present. Exam reveals no gallop. No murmur heard. Pulmonary/Chest: Effort normal and breath sounds normal.   Abdominal: Soft. Normal appearance and bowel sounds are normal. There is no abdominal tenderness. Musculoskeletal:      Right elbow: Tenderness found. Medial epicondyle tenderness noted. Right wrist: She exhibits decreased range of motion and tenderness. Arms:       Comments: +Tinel's on right, +Phalen's on right hand. Lymphadenopathy:     She has no cervical adenopathy. Neurological: She is alert and oriented to person, place, and time. She has normal strength. No cranial nerve deficit or sensory deficit. Skin: Skin is warm, dry and intact. Psychiatric: She has a normal mood and affect.  Her speech is normal and behavior is normal.                                                                                        PROVISIONAL DIAGNOSES / SURGERY:      Right Carpal Tunnel Syndrome   Right Open Carpal Tunnel Release    KERRIE Kelsey CNP on 5/8/2020 at 1:46 PM

## 2020-05-08 NOTE — H&P
HISTORY and [id number]       NAME:  Morales Herzog  MRN: [id number]   YOB: 1955   Date: 5/8/2020   Age: 59 y.o. Gender: female       COMPLAINT AND PRESENT HISTORY:     Morales Herzog is 59 y.o. , female, Preadmission Testing for right open carpal tunnel release. Pt states she has been dealing with right wrist pain for 20 years and has been wearing a brace at night. She reports she is right handed. She has numbness and tingling into the right hand and fingers. She notes when she wakes in the morning her fingers are numb on the right hand. She has noticed increase in pain, throbbing aching in nature. She notices the pain is worse at night. Pain radiates into the right elbow. Other associated symptoms include: weakness, decreased  strength. Pt states she was supposed to have this surgery 4/9/2020 but had to be rescheduled due to the COVID 19 pandemic. No chest pain or SOB. No fever or chills. No N/V/D or constipation. No other somatic complaints today.        PAST MEDICAL HISTORY     Past Medical History:   Diagnosis Date    Asthma     BMI 37.0-37.9, adult 11/30/2018    Depression     GERD (gastroesophageal reflux disease)     Glaucoma associated with anomalies of iris     had surgery, no eye drops    High blood triglycerides 02/21/2018    191 on     Hypertension     Hypothyroidism     Left elbow fracture     no surgery    Obesity     Open abdominal wall wound 10/7/2015    Osteopenia     Tendonitis     Unspecified sleep apnea     CPAP    Urinary incontinence     Vision abnormalities     glasses    Vitamin B1 deficiency     Vitamin D deficiency        SURGICAL HISTORY       Past Surgical History:   Procedure Laterality Date    ABDOMINOPLASTY  1995    ABSCESS DRAINAGE  9-16-15    abd. wall abscess    BUNIONECTOMY Bilateral 2003    CATARACT REMOVAL Bilateral 2007    CHOLECYSTECTOMY  2003    COLONOSCOPY  2012    negative, repeat in 10 years    ENDOSCOPY, COLON, DIAGNOSTIC      EYE SURGERY      see below other history    GLAUCOMA SURGERY  2007    KNEE ARTHROSCOPY Right     torn meniscus    MD EXPLORE WOUND,ABDOMEN/FLANK/BACK Left 2018    WOUND EXPLORATION W/WOUND VAC PLACEMENT / I&D performed by Viridiana Cosby DO at [address] [facility] ARTHROSCOP,SURG,W/ROTAT CUFF REPR Right 2018    SHOULDER ARTHROSCOPY ROTATOR CUFF REPAIR performed by Cailin Fuller MD at [address] [facility]    stomach stapling, [facility]    TONSILLECTOMY      3 y.o.    WISDOM TOOTH EXTRACTION         FAMILY HISTORY       Family History   Problem Relation Age of Onset    Diabetes Maternal Grandmother    Erickson COPD Mother     Rheum Arthritis Mother     Osteoarthritis Mother     Osteoporosis Mother     Other Mother         AAA    High Blood Pressure Mother     Glaucoma Father     Arthritis Father     Cancer Father         throat cancer twice        SOCIAL HISTORY       Social History     Socioeconomic History    Marital status:      Spouse name: None    Number of children: None    Years of education: None    Highest education level: None   Occupational History    None   Social Needs    Financial resource strain: None    Food insecurity     Worry: None     Inability: None    Transportation needs     Medical: None     Non-medical: None   Tobacco Use    Smoking status: Former Smoker     Last attempt to quit: 2013     Years since quittin.8    Smokeless tobacco: Never Used   Substance and Sexual Activity    Alcohol use:  Yes     Alcohol/week: 4.0 standard drinks     Types: 4 Standard drinks or equivalent per week     Comment: occasionally weekends    Drug use: No    Sexual activity: Yes     Partners: Male     Birth control/protection: Post-menopausal   Lifestyle    Physical activity     Days per week: None     Minutes per session: None    Stress: None   Relationships    Social tablet Take 50 mg by mouth 2 times daily.  hydrOXYzine (ATARAX) 25 MG tablet Take 25 mg by mouth daily. No current facility-administered medications on file prior to encounter. Review of Systems   Constitutional: Negative for chills, fatigue and fever. HENT: Negative for congestion, postnasal drip, sinus pain and sore throat. Respiratory: Negative for cough, shortness of breath and wheezing. Cardiovascular: Negative for chest pain and palpitations. Gastrointestinal: Negative for abdominal pain, constipation, diarrhea, nausea and vomiting. Genitourinary: Negative for difficulty urinating, dysuria, frequency and urgency. Musculoskeletal: Positive for arthralgias and joint swelling. Wrist pain, right     Neurological: Negative for dizziness, weakness, numbness and headaches. Psychiatric/Behavioral: Negative. GENERAL PHYSICAL EXAM:     Vitals: /67   Pulse 76   Temp 97.8 °F (36.6 °C) (Temporal)   Resp 16   Ht 5' 1\" (1.549 m)   Wt 220 lb (99.8 kg)   LMP 07/01/2010 (Approximate)   SpO2 96%   BMI 41.57 kg/m²  Body mass index is 41.57 kg/m². GENERAL APPEARANCE:   Toña Lee is 59 y.o.,  female, nourished, conscious, alert. Does not appear to be distress or pain at this time. Physical Exam   Constitutional: She is oriented to person, place, and time. Vital signs are normal. She appears well-developed. She is cooperative. No distress. HENT:   Head: Normocephalic and atraumatic. Right Ear: Hearing and external ear normal.   Left Ear: Hearing and external ear normal.   Nose: Nose normal.   Mouth/Throat: Uvula is midline, oropharynx is clear and moist and mucous membranes are normal.   Eyes: Pupils are equal, round, and reactive to light. Conjunctivae and lids are normal. No scleral icterus. Neck: Trachea normal. Neck supple. Cardiovascular: Normal rate, regular rhythm and normal heart sounds.   No

## 2020-05-09 LAB
EKG ATRIAL RATE: 72 BPM
EKG P AXIS: 21 DEGREES
EKG P-R INTERVAL: 156 MS
EKG Q-T INTERVAL: 396 MS
EKG QRS DURATION: 90 MS
EKG QTC CALCULATION (BAZETT): 433 MS
EKG R AXIS: 1 DEGREES
EKG T AXIS: 28 DEGREES
EKG VENTRICULAR RATE: 72 BPM

## 2020-05-09 PROCEDURE — 93010 ELECTROCARDIOGRAM REPORT: CPT | Performed by: INTERNAL MEDICINE

## 2020-05-15 ENCOUNTER — OFFICE VISIT (OUTPATIENT)
Dept: ORTHOPEDIC SURGERY | Age: 65
End: 2020-05-15
Payer: COMMERCIAL

## 2020-05-15 VITALS — BODY MASS INDEX: 40.59 KG/M2 | HEIGHT: 61 IN | WEIGHT: 215 LBS

## 2020-05-15 PROCEDURE — 99213 OFFICE O/P EST LOW 20 MIN: CPT | Performed by: ORTHOPAEDIC SURGERY

## 2020-05-15 RX ORDER — ONDANSETRON 4 MG/1
4 TABLET, FILM COATED ORAL 3 TIMES DAILY PRN
Qty: 15 TABLET | Refills: 0 | Status: SHIPPED | OUTPATIENT
Start: 2020-05-15 | End: 2020-11-05 | Stop reason: ALTCHOICE

## 2020-05-15 RX ORDER — HYDROCODONE BITARTRATE AND ACETAMINOPHEN 5; 325 MG/1; MG/1
1 TABLET ORAL EVERY 4 HOURS PRN
Qty: 30 TABLET | Refills: 0 | Status: SHIPPED | OUTPATIENT
Start: 2020-05-15 | End: 2020-05-22

## 2020-05-18 ENCOUNTER — HOSPITAL ENCOUNTER (OUTPATIENT)
Dept: PREADMISSION TESTING | Age: 65
Setting detail: SPECIMEN
Discharge: HOME | End: 2020-05-22
Payer: COMMERCIAL

## 2020-05-18 PROCEDURE — U0004 COV-19 TEST NON-CDC HGH THRU: HCPCS

## 2020-05-19 RX ORDER — SODIUM CHLORIDE 0.9 % (FLUSH) 0.9 %
10 SYRINGE (ML) INJECTION EVERY 12 HOURS SCHEDULED
Status: CANCELLED | OUTPATIENT
Start: 2020-05-19

## 2020-05-19 RX ORDER — SODIUM CHLORIDE 0.9 % (FLUSH) 0.9 %
10 SYRINGE (ML) INJECTION PRN
Status: CANCELLED | OUTPATIENT
Start: 2020-05-19

## 2020-05-19 RX ORDER — ACETAMINOPHEN 325 MG/1
1000 TABLET ORAL ONCE
Status: CANCELLED | OUTPATIENT
Start: 2020-05-19 | End: 2020-05-19

## 2020-05-20 ENCOUNTER — HOSPITAL ENCOUNTER (OUTPATIENT)
Age: 65
Setting detail: OUTPATIENT SURGERY
Discharge: HOME | End: 2020-05-20
Attending: ORTHOPAEDIC SURGERY | Admitting: ORTHOPAEDIC SURGERY
Payer: COMMERCIAL

## 2020-05-20 ENCOUNTER — ANESTHESIA (OUTPATIENT)
Dept: OPERATING ROOM | Age: 65
End: 2020-05-20
Payer: COMMERCIAL

## 2020-05-20 ENCOUNTER — ANESTHESIA EVENT (OUTPATIENT)
Dept: OPERATING ROOM | Age: 65
End: 2020-05-20
Payer: COMMERCIAL

## 2020-05-20 VITALS
HEIGHT: 61 IN | OXYGEN SATURATION: 97 % | WEIGHT: 220 LBS | SYSTOLIC BLOOD PRESSURE: 114 MMHG | HEART RATE: 86 BPM | RESPIRATION RATE: 16 BRPM | TEMPERATURE: 97 F | DIASTOLIC BLOOD PRESSURE: 68 MMHG | BODY MASS INDEX: 41.54 KG/M2

## 2020-05-20 VITALS — DIASTOLIC BLOOD PRESSURE: 82 MMHG | SYSTOLIC BLOOD PRESSURE: 124 MMHG | TEMPERATURE: 96.8 F | OXYGEN SATURATION: 100 %

## 2020-05-20 LAB
SARS-COV-2, PCR: NOT DETECTED
SARS-COV-2, RAPID: NORMAL
SARS-COV-2: NORMAL
SOURCE: NORMAL

## 2020-05-20 PROCEDURE — 2500000003 HC RX 250 WO HCPCS: Performed by: ORTHOPAEDIC SURGERY

## 2020-05-20 PROCEDURE — 3700000000 HC ANESTHESIA ATTENDED CARE: Performed by: ORTHOPAEDIC SURGERY

## 2020-05-20 PROCEDURE — 7100000010 HC PHASE II RECOVERY - FIRST 15 MIN: Performed by: ORTHOPAEDIC SURGERY

## 2020-05-20 PROCEDURE — 6360000002 HC RX W HCPCS: Performed by: NURSE ANESTHETIST, CERTIFIED REGISTERED

## 2020-05-20 PROCEDURE — 6370000000 HC RX 637 (ALT 250 FOR IP): Performed by: ORTHOPAEDIC SURGERY

## 2020-05-20 PROCEDURE — 7100000030 HC ASPR PHASE II RECOVERY - FIRST 15 MIN: Performed by: ORTHOPAEDIC SURGERY

## 2020-05-20 PROCEDURE — 7100000031 HC ASPR PHASE II RECOVERY - ADDTL 15 MIN: Performed by: ORTHOPAEDIC SURGERY

## 2020-05-20 PROCEDURE — 3600000002 HC SURGERY LEVEL 2 BASE: Performed by: ORTHOPAEDIC SURGERY

## 2020-05-20 PROCEDURE — 7100000011 HC PHASE II RECOVERY - ADDTL 15 MIN: Performed by: ORTHOPAEDIC SURGERY

## 2020-05-20 PROCEDURE — 7100000000 HC PACU RECOVERY - FIRST 15 MIN: Performed by: ORTHOPAEDIC SURGERY

## 2020-05-20 PROCEDURE — 2500000003 HC RX 250 WO HCPCS: Performed by: NURSE ANESTHETIST, CERTIFIED REGISTERED

## 2020-05-20 PROCEDURE — 3600000012 HC SURGERY LEVEL 2 ADDTL 15MIN: Performed by: ORTHOPAEDIC SURGERY

## 2020-05-20 PROCEDURE — 6360000002 HC RX W HCPCS: Performed by: ORTHOPAEDIC SURGERY

## 2020-05-20 PROCEDURE — 64721 CARPAL TUNNEL SURGERY: CPT | Performed by: ORTHOPAEDIC SURGERY

## 2020-05-20 PROCEDURE — 3700000001 HC ADD 15 MINUTES (ANESTHESIA): Performed by: ORTHOPAEDIC SURGERY

## 2020-05-20 PROCEDURE — 2709999900 HC NON-CHARGEABLE SUPPLY: Performed by: ORTHOPAEDIC SURGERY

## 2020-05-20 PROCEDURE — 7100000001 HC PACU RECOVERY - ADDTL 15 MIN: Performed by: ORTHOPAEDIC SURGERY

## 2020-05-20 PROCEDURE — 2580000003 HC RX 258: Performed by: ANESTHESIOLOGY

## 2020-05-20 RX ORDER — FENTANYL CITRATE 50 UG/ML
25 INJECTION, SOLUTION INTRAMUSCULAR; INTRAVENOUS EVERY 5 MIN PRN
Status: DISCONTINUED | OUTPATIENT
Start: 2020-05-20 | End: 2020-05-20 | Stop reason: HOSPADM

## 2020-05-20 RX ORDER — LIDOCAINE HYDROCHLORIDE 10 MG/ML
INJECTION, SOLUTION EPIDURAL; INFILTRATION; INTRACAUDAL; PERINEURAL PRN
Status: DISCONTINUED | OUTPATIENT
Start: 2020-05-20 | End: 2020-05-20 | Stop reason: SDUPTHER

## 2020-05-20 RX ORDER — SODIUM CHLORIDE 0.9 % (FLUSH) 0.9 %
10 SYRINGE (ML) INJECTION PRN
Status: DISCONTINUED | OUTPATIENT
Start: 2020-05-20 | End: 2020-05-20 | Stop reason: HOSPADM

## 2020-05-20 RX ORDER — FENTANYL CITRATE 50 UG/ML
INJECTION, SOLUTION INTRAMUSCULAR; INTRAVENOUS PRN
Status: DISCONTINUED | OUTPATIENT
Start: 2020-05-20 | End: 2020-05-20 | Stop reason: SDUPTHER

## 2020-05-20 RX ORDER — PROPOFOL 10 MG/ML
INJECTION, EMULSION INTRAVENOUS PRN
Status: DISCONTINUED | OUTPATIENT
Start: 2020-05-20 | End: 2020-05-20 | Stop reason: SDUPTHER

## 2020-05-20 RX ORDER — METOCLOPRAMIDE HYDROCHLORIDE 5 MG/ML
INJECTION INTRAMUSCULAR; INTRAVENOUS PRN
Status: DISCONTINUED | OUTPATIENT
Start: 2020-05-20 | End: 2020-05-20 | Stop reason: SDUPTHER

## 2020-05-20 RX ORDER — DIPHENHYDRAMINE HYDROCHLORIDE 50 MG/ML
12.5 INJECTION INTRAMUSCULAR; INTRAVENOUS
Status: DISCONTINUED | OUTPATIENT
Start: 2020-05-20 | End: 2020-05-20 | Stop reason: HOSPADM

## 2020-05-20 RX ORDER — EPHEDRINE SULFATE/0.9% NACL/PF 50 MG/5 ML
SYRINGE (ML) INTRAVENOUS PRN
Status: DISCONTINUED | OUTPATIENT
Start: 2020-05-20 | End: 2020-05-20 | Stop reason: SDUPTHER

## 2020-05-20 RX ORDER — ONDANSETRON 2 MG/ML
INJECTION INTRAMUSCULAR; INTRAVENOUS PRN
Status: DISCONTINUED | OUTPATIENT
Start: 2020-05-20 | End: 2020-05-20 | Stop reason: SDUPTHER

## 2020-05-20 RX ORDER — SODIUM CHLORIDE, SODIUM LACTATE, POTASSIUM CHLORIDE, CALCIUM CHLORIDE 600; 310; 30; 20 MG/100ML; MG/100ML; MG/100ML; MG/100ML
INJECTION, SOLUTION INTRAVENOUS CONTINUOUS
Status: DISCONTINUED | OUTPATIENT
Start: 2020-05-20 | End: 2020-05-20 | Stop reason: HOSPADM

## 2020-05-20 RX ORDER — ACETAMINOPHEN 500 MG
1000 TABLET ORAL ONCE
Status: COMPLETED | OUTPATIENT
Start: 2020-05-20 | End: 2020-05-20

## 2020-05-20 RX ORDER — 0.9 % SODIUM CHLORIDE 0.9 %
500 INTRAVENOUS SOLUTION INTRAVENOUS
Status: DISCONTINUED | OUTPATIENT
Start: 2020-05-20 | End: 2020-05-20 | Stop reason: HOSPADM

## 2020-05-20 RX ORDER — LABETALOL 20 MG/4 ML (5 MG/ML) INTRAVENOUS SYRINGE
5 EVERY 10 MIN PRN
Status: DISCONTINUED | OUTPATIENT
Start: 2020-05-20 | End: 2020-05-20 | Stop reason: HOSPADM

## 2020-05-20 RX ORDER — BUPIVACAINE HYDROCHLORIDE 5 MG/ML
INJECTION, SOLUTION EPIDURAL; INTRACAUDAL PRN
Status: DISCONTINUED | OUTPATIENT
Start: 2020-05-20 | End: 2020-05-20 | Stop reason: ALTCHOICE

## 2020-05-20 RX ORDER — PROMETHAZINE HYDROCHLORIDE 25 MG/ML
6.25 INJECTION, SOLUTION INTRAMUSCULAR; INTRAVENOUS
Status: DISCONTINUED | OUTPATIENT
Start: 2020-05-20 | End: 2020-05-20 | Stop reason: CLARIF

## 2020-05-20 RX ORDER — METOCLOPRAMIDE HYDROCHLORIDE 5 MG/ML
10 INJECTION INTRAMUSCULAR; INTRAVENOUS
Status: DISCONTINUED | OUTPATIENT
Start: 2020-05-20 | End: 2020-05-20 | Stop reason: HOSPADM

## 2020-05-20 RX ORDER — DEXAMETHASONE SODIUM PHOSPHATE 10 MG/ML
10 INJECTION, SOLUTION INTRAMUSCULAR; INTRAVENOUS ONCE
Status: COMPLETED | OUTPATIENT
Start: 2020-05-20 | End: 2020-05-20

## 2020-05-20 RX ORDER — SODIUM CHLORIDE 0.9 % (FLUSH) 0.9 %
10 SYRINGE (ML) INJECTION EVERY 12 HOURS SCHEDULED
Status: DISCONTINUED | OUTPATIENT
Start: 2020-05-20 | End: 2020-05-20 | Stop reason: HOSPADM

## 2020-05-20 RX ORDER — HYDRALAZINE HYDROCHLORIDE 20 MG/ML
5 INJECTION INTRAMUSCULAR; INTRAVENOUS EVERY 10 MIN PRN
Status: DISCONTINUED | OUTPATIENT
Start: 2020-05-20 | End: 2020-05-20 | Stop reason: HOSPADM

## 2020-05-20 RX ADMIN — ONDANSETRON 4 MG: 2 INJECTION INTRAMUSCULAR; INTRAVENOUS at 07:04

## 2020-05-20 RX ADMIN — SODIUM CHLORIDE, POTASSIUM CHLORIDE, SODIUM LACTATE AND CALCIUM CHLORIDE: 600; 310; 30; 20 INJECTION, SOLUTION INTRAVENOUS at 06:35

## 2020-05-20 RX ADMIN — Medication 15 MG: at 07:10

## 2020-05-20 RX ADMIN — METOCLOPRAMIDE 10 MG: 5 INJECTION, SOLUTION INTRAMUSCULAR; INTRAVENOUS at 07:03

## 2020-05-20 RX ADMIN — ACETAMINOPHEN 1000 MG: 500 TABLET, FILM COATED ORAL at 06:35

## 2020-05-20 RX ADMIN — CEFAZOLIN 2 G: 330 INJECTION, POWDER, FOR SOLUTION INTRAMUSCULAR; INTRAVENOUS at 07:10

## 2020-05-20 RX ADMIN — DEXAMETHASONE SODIUM PHOSPHATE 10 MG: 10 INJECTION, SOLUTION INTRAMUSCULAR; INTRAVENOUS at 07:12

## 2020-05-20 RX ADMIN — LIDOCAINE HYDROCHLORIDE 50 MG: 10 INJECTION, SOLUTION EPIDURAL; INFILTRATION; INTRACAUDAL; PERINEURAL at 07:05

## 2020-05-20 RX ADMIN — Medication 20 MG: at 07:18

## 2020-05-20 RX ADMIN — PROPOFOL 200 MG: 10 INJECTION, EMULSION INTRAVENOUS at 07:05

## 2020-05-20 RX ADMIN — FENTANYL CITRATE 50 MCG: 50 INJECTION, SOLUTION INTRAMUSCULAR; INTRAVENOUS at 07:04

## 2020-05-20 RX ADMIN — Medication 15 MG: at 07:13

## 2020-05-20 ASSESSMENT — PULMONARY FUNCTION TESTS
PIF_VALUE: 0
PIF_VALUE: 15
PIF_VALUE: 17
PIF_VALUE: 18
PIF_VALUE: 15
PIF_VALUE: 15
PIF_VALUE: 1
PIF_VALUE: 21
PIF_VALUE: 20
PIF_VALUE: 19
PIF_VALUE: 17
PIF_VALUE: 20
PIF_VALUE: 18
PIF_VALUE: 20
PIF_VALUE: 18
PIF_VALUE: 16
PIF_VALUE: 17
PIF_VALUE: 3
PIF_VALUE: 20
PIF_VALUE: 21
PIF_VALUE: 17
PIF_VALUE: 20
PIF_VALUE: 1
PIF_VALUE: 18
PIF_VALUE: 18
PIF_VALUE: 17
PIF_VALUE: 21
PIF_VALUE: 18
PIF_VALUE: 21
PIF_VALUE: 18
PIF_VALUE: 5
PIF_VALUE: 21
PIF_VALUE: 21
PIF_VALUE: 15
PIF_VALUE: 18
PIF_VALUE: 1
PIF_VALUE: 18

## 2020-05-20 ASSESSMENT — PAIN SCALES - GENERAL
PAINLEVEL_OUTOF10: 0

## 2020-05-20 ASSESSMENT — PAIN - FUNCTIONAL ASSESSMENT: PAIN_FUNCTIONAL_ASSESSMENT: 0-10

## 2020-05-20 ASSESSMENT — PAIN DESCRIPTION - ORIENTATION: ORIENTATION: RIGHT

## 2020-05-20 ASSESSMENT — PAIN DESCRIPTION - PAIN TYPE
TYPE: SURGICAL PAIN
TYPE: SURGICAL PAIN

## 2020-05-20 ASSESSMENT — PAIN DESCRIPTION - LOCATION: LOCATION: WRIST

## 2020-05-20 NOTE — OP NOTE
longitudinal incision ulnar to her thenar crease was made through skin and blunt dissection was carried down to the level of the palmar fascia which was incised sharply to expose the transverse carpal ligament which in turn was incised in longitudinal fashion in line with the skin incision first to its distal extent stopping once the fat pad was reached indicating the distal extent of the tunnel. The proximal extent of the transverse carpal ligament and distal forearm fascia was bluntly dissected and under direct visualization released along its length taking care to avoid injury to any traversing nerve branches. The carpal tunnel was bluntly probed to ensure complete release of the transverse carpal ligament and distal forearm fascia. Satisfied with the release the patient's incision was thoroughly irrigated and incision site infiltrated with approximately 5 cc of 0.5% marcaine without epi. The patient's incision was then closed using 3-0 nylon sutures. Sterile dressings were then applied using Xeroform, 4 x 4 gauze and Tegaderm. A compressive Ace wrap was also applied. The tourniquet was deflated after total time of 14 minutes. The patient was awoken transferred to her bed and wheeled to recovery in stable condition.      Complications: None     Post-operative Condition: Stable

## 2020-05-20 NOTE — ANESTHESIA PRE PROCEDURE
urinary incontinence, female) N39.3    Open abdominal wall wound S31.109A    Chronic pain of right knee M25.561, G89.29    Prediabetes R73.03    Abdominal wall ulcer, with fat layer exposed ([address] L98.492    BMI 37.0-37.9, adult Z68.37    Right carpal tunnel syndrome G56.01       Past Medical History:        Diagnosis Date    Asthma     BMI 37.0-37.9, adult 2018    Depression     GERD (gastroesophageal reflux disease)     Glaucoma associated with anomalies of iris     had surgery, no eye drops    High blood triglycerides 2018    191 on     Hypertension     Hypothyroidism     Left elbow fracture     no surgery    Obesity     Open abdominal wall wound 10/7/2015    Osteopenia     Tendonitis     Unspecified sleep apnea     CPAP    Urinary incontinence     Vision abnormalities     glasses    Vitamin B1 deficiency     Vitamin D deficiency        Past Surgical History:        Procedure Laterality Date    ABDOMINOPLASTY      ABSCESS DRAINAGE  9-16-15    abd. wall abscess    BUNIONECTOMY Bilateral     CATARACT REMOVAL Bilateral     CHOLECYSTECTOMY      COLONOSCOPY      negative, repeat in 10 years    ENDOSCOPY, COLON, DIAGNOSTIC      EYE SURGERY      see below other history    GLAUCOMA SURGERY  2007    KNEE ARTHROSCOPY Right     torn meniscus    MO EXPLORE WOUND,ABDOMEN/FLANK/BACK Left 2018    WOUND EXPLORATION W/WOUND VAC PLACEMENT / I&D performed by Rachelle Torres DO at [address] [facility] ARTHROSCOP,SURG,W/ROTAT CUFF REPR Right 2018    SHOULDER ARTHROSCOPY ROTATOR CUFF REPAIR performed by Lashay Georges MD at [address] [facility]    stomach stapling, [facility]    TONSILLECTOMY      3 y.o.    WISDOM TOOTH EXTRACTION         Social History:    Social History     Tobacco Use    Smoking status: Former Smoker     Last attempt to quit: 2013     Years since quittin.9    Smokeless

## 2020-05-20 NOTE — INTERVAL H&P NOTE
Update History & Physical    The patient's History and Physical of May 8, 2020 was reviewed with the patient and I examined the patient. There was no change. The surgical site was confirmed by the patient and me. NPO since before midnight. Took losartan, synthroid, symbicort and omeprazole this am with sip of water. No blood thinners in last 7 days. Denies any current chest pain/pressure, palpitations, SOB, recent URI, nausea, vomiting, diarrhea, constipation, fever or chills. Pt normotensive, afebrile.      Electronically signed by KERRIE Joseph CNP on 5/20/2020 at 6:12 AM

## 2020-05-21 ENCOUNTER — TELEPHONE (OUTPATIENT)
Dept: FAMILY MEDICINE CLINIC | Age: 65
End: 2020-05-21

## 2020-06-01 ENCOUNTER — OFFICE VISIT (OUTPATIENT)
Dept: ORTHOPEDIC SURGERY | Age: 65
End: 2020-06-01

## 2020-06-01 ENCOUNTER — TELEPHONE (OUTPATIENT)
Dept: ORTHOPEDIC SURGERY | Age: 65
End: 2020-06-01

## 2020-06-01 VITALS — WEIGHT: 210 LBS | HEIGHT: 61 IN | BODY MASS INDEX: 39.65 KG/M2

## 2020-06-01 PROCEDURE — 99024 POSTOP FOLLOW-UP VISIT: CPT | Performed by: ORTHOPAEDIC SURGERY

## 2020-06-01 NOTE — TELEPHONE ENCOUNTER
Pt was seen in the office today and needs todays office notes for her . She would like them faxed to herself at [phone].

## 2020-06-02 NOTE — TELEPHONE ENCOUNTER
OV note faxed over to the number she provided. I also called the patient and informed her of the fax.

## 2020-06-29 ENCOUNTER — OFFICE VISIT (OUTPATIENT)
Dept: ORTHOPEDIC SURGERY | Age: 65
End: 2020-06-29

## 2020-06-29 VITALS — BODY MASS INDEX: 41.16 KG/M2 | WEIGHT: 218 LBS | HEIGHT: 61 IN

## 2020-06-29 PROCEDURE — 99024 POSTOP FOLLOW-UP VISIT: CPT | Performed by: ORTHOPAEDIC SURGERY

## 2020-07-02 NOTE — PROGRESS NOTES
Procedure: Right open carpal tunnel release  Date of procedure: 5/20/2020    HPI: Ms. Neville Mello is a 28-year-old who is approximately 6 weeks status post the aforementioned procedure. She states that she continues to do well at this time. She does report some soreness at the incision site with pressure. She rates this as a 1-2/10. She continues to be without numbness or tingling. Overall she remains happy and again is back at work and is able to perform her regular activities. Physical examination:  Evaluation of patient's right hand and upper extremity demonstrates her incision to be appropriately healed. No swelling present at this time. Sensation is grossly intact light touch in all dermatomes and she has a 2+ radial pulse with brisk capillary refill in her fingers. She can actively flex, extend, abduct and abduct all of her fingers. She does have some tenderness along her incision. Impression and plan: Ms. Neville Mello is a 28-year-old 6-week status post an open right carpal tunnel release. She continues to do quite well at this time. She has some pillar pain. I had a discussion with the patient today with regards to this. We will continue to monitor at this time. She can gradually increase her activity as she feels comfortable. I will see her back for reevaluation in 6 weeks but she may return or call earlier with questions and or concerns.

## 2020-08-10 ENCOUNTER — OFFICE VISIT (OUTPATIENT)
Dept: ORTHOPEDIC SURGERY | Age: 65
End: 2020-08-10

## 2020-08-10 VITALS — WEIGHT: 218 LBS | BODY MASS INDEX: 41.16 KG/M2 | HEIGHT: 61 IN

## 2020-08-10 PROCEDURE — 99024 POSTOP FOLLOW-UP VISIT: CPT | Performed by: ORTHOPAEDIC SURGERY

## 2020-08-10 NOTE — PROGRESS NOTES
Procedure: Right open carpal tunnel release  Date of procedure: 5/20/2020    HPI: Ms. Prakash Schneider is a 40-year-old who is approximately 3 months status post the aforementioned procedure. She indicates that she is doing very well having really no pain at this time. She rates her pain as a 0/10. She is back to her regular activities and indicates that she is noticing some improvement in her strength. Physical examination:  Evaluation of patient's right hand and upper extremity demonstrates her incision to be appropriately healed. No swelling present at this time. Sensation is grossly intact light touch in all dermatomes and she has a 2+ radial pulse with brisk capillary refill in her fingers. She can actively flex, extend, abduct and abduct all of her fingers. She continues to have some tenderness to palpation along her incision. Impression and plan: Ms. Prakash Schneider is a 40-year-old 3 months status post an open right carpal tunnel release. She is doing quite well at this time. She may continue to increase her activity as she feels comfortable. I anticipate continued improvement in soreness she has around her incision with time. I will have her follow-up in my clinic as needed but she may return or call at anytime with questions and or concerns.

## 2020-08-11 RX ORDER — ALENDRONATE SODIUM 70 MG/1
TABLET ORAL
Qty: 12 TABLET | Refills: 3 | OUTPATIENT
Start: 2020-08-11

## 2020-08-11 NOTE — TELEPHONE ENCOUNTER
Patient has not been seen since 2019. Overdue for annual exam. Medication to be refilled once appointment is scheduled, per office policy.

## 2020-09-21 ENCOUNTER — HOSPITAL ENCOUNTER (OUTPATIENT)
Dept: GENERAL RADIOLOGY | Age: 65
Discharge: HOME | End: 2020-09-23
Payer: COMMERCIAL

## 2020-09-21 ENCOUNTER — HOSPITAL ENCOUNTER (OUTPATIENT)
Age: 65
Discharge: HOME | End: 2020-09-23
Payer: COMMERCIAL

## 2020-09-21 PROCEDURE — 73562 X-RAY EXAM OF KNEE 3: CPT

## 2020-09-22 RX ORDER — ALENDRONATE SODIUM 70 MG/1
70 TABLET ORAL
Qty: 12 TABLET | Refills: 0 | Status: SHIPPED | OUTPATIENT
Start: 2020-09-22 | End: 2020-09-28 | Stop reason: SDUPTHER

## 2020-09-22 NOTE — TELEPHONE ENCOUNTER
Pt needs refill on her fosamax please call into [facility]  , pt has her annual scheduled on 09/28

## 2020-09-28 ENCOUNTER — HOSPITAL ENCOUNTER (OUTPATIENT)
Age: 65
Setting detail: SPECIMEN
Discharge: HOME | End: 2020-09-28
Payer: COMMERCIAL

## 2020-09-28 ENCOUNTER — OFFICE VISIT (OUTPATIENT)
Dept: OBGYN CLINIC | Age: 65
End: 2020-09-28
Payer: COMMERCIAL

## 2020-09-28 VITALS
DIASTOLIC BLOOD PRESSURE: 78 MMHG | SYSTOLIC BLOOD PRESSURE: 130 MMHG | TEMPERATURE: 97.3 F | BODY MASS INDEX: 40.4 KG/M2 | HEIGHT: 61 IN | WEIGHT: 214 LBS

## 2020-09-28 PROCEDURE — G0145 SCR C/V CYTO,THINLAYER,RESCR: HCPCS

## 2020-09-28 PROCEDURE — 99397 PER PM REEVAL EST PAT 65+ YR: CPT | Performed by: OBSTETRICS & GYNECOLOGY

## 2020-09-28 PROCEDURE — 87624 HPV HI-RISK TYP POOLED RSLT: CPT

## 2020-09-28 RX ORDER — ALENDRONATE SODIUM 70 MG/1
70 TABLET ORAL
Qty: 12 TABLET | Refills: 3 | Status: SHIPPED | OUTPATIENT
Start: 2020-09-28 | End: 2020-12-27

## 2020-09-28 RX ORDER — NALTREXONE HYDROCHLORIDE AND BUPROPION HYDROCHLORIDE 8; 90 MG/1; MG/1
TABLET, EXTENDED RELEASE ORAL
COMMUNITY
Start: 2020-09-10

## 2020-09-28 NOTE — PROGRESS NOTES
History and Physical  [address], [address] [address], [address] [facility] [phone]   Fax [phone]  Shanti Holbrook  2020              72 y.o. Chief Complaint   Patient presents with    Annual Exam       Patient's last menstrual period was 2010 (approximate).              Primary Care Physician: Franko Rivas DO      HPI : Shanti Holbrook is a 72 y.o. female     Patient doing well  No complaints or concerns  No vaginal bleeding since menopause  Due for mammogram and DEXA scan  ________________________________________________________________________  OB History    Para Term  AB Living   1 1 1 0 0 1   SAB TAB Ectopic Molar Multiple Live Births   0 0 0 0 0 1      # Outcome Date GA Lbr Josemanuel/2nd Weight Sex Delivery Anes PTL Lv   1 Term    7 lb 7 oz (3.374 kg) M Vag-Spont  N GIL     Past Medical History:   Diagnosis Date    Asthma     BMI 37.0-37.9, adult 2018    Depression     GERD (gastroesophageal reflux disease)     Glaucoma associated with anomalies of iris     had surgery, no eye drops    High blood triglycerides 2018    191 on     Hypertension     Hypothyroidism     Left elbow fracture     no surgery    Obesity     Open abdominal wall wound 10/7/2015    Osteopenia     Tendonitis     Unspecified sleep apnea     CPAP    Urinary incontinence     Vision abnormalities     glasses    Vitamin B1 deficiency     Vitamin D deficiency                                                                    Past Surgical History:   Procedure Laterality Date    ABDOMINOPLASTY      ABSCESS DRAINAGE  9-16-15    abd. wall abscess    BUNIONECTOMY Bilateral     CARPAL TUNNEL RELEASE Right 2020    OPEN CARPAL TUNNEL RELEASE performed by Georgi Macias MD at [facility] Bilateral     CHOLECYSTECTOMY      COLONOSCOPY  2012    negative, repeat in 10 years    ENDOSCOPY, COLON, DIAGNOSTIC  EYE SURGERY      see below other history    GLAUCOMA SURGERY      KNEE ARTHROSCOPY Right     torn meniscus    ME EXPLORE WOUND,ABDOMEN/FLANK/BACK Left 2018    WOUND EXPLORATION W/WOUND VAC PLACEMENT / I&D performed by Zaynab Lindsey DO at [address] [facility] ARTHROSCOP,SURG,W/ROTAT CUFF REPR Right 2018    SHOULDER ARTHROSCOPY ROTATOR CUFF REPAIR performed by Harrison Michelle MD at [address] [facility]    stomach stapling, [facility]    TONSILLECTOMY      1 y.o.    WISDOM TOOTH EXTRACTION       Family History   Problem Relation Age of Onset    Diabetes Maternal Grandmother    [place] COPD Mother     Rheum Arthritis Mother     Osteoarthritis Mother     Osteoporosis Mother     Other Mother         AAA    High Blood Pressure Mother     Glaucoma Father     Arthritis Father     Cancer Father         throat cancer twice      Social History     Socioeconomic History    Marital status:      Spouse name: Not on file    Number of children: Not on file    Years of education: Not on file    Highest education level: Not on file   Occupational History    Not on file   Social Needs    Financial resource strain: Not on file    Food insecurity     Worry: Not on file     Inability: Not on file    Transportation needs     Medical: Not on file     Non-medical: Not on file   Tobacco Use    Smoking status: Former Smoker     Last attempt to quit: 2013     Years since quittin.2    Smokeless tobacco: Never Used   Substance and Sexual Activity    Alcohol use:  Yes     Alcohol/week: 4.0 standard drinks     Types: 4 Standard drinks or equivalent per week     Comment: occasionally weekends    Drug use: No    Sexual activity: Yes     Partners: Male     Birth control/protection: Post-menopausal   Lifestyle    Physical activity     Days per week: Not on file     Minutes per session: Not on file    Stress: Not on file   Relationships    Social connections     Talks on phone: Not on file     Gets together: Not on file     Attends Jainism service: Not on file     Active member of club or organization: Not on file     Attends meetings of clubs or organizations: Not on file     Relationship status: Not on file    Intimate partner violence     Fear of current or ex partner: Not on file     Emotionally abused: Not on file     Physically abused: Not on file     Forced sexual activity: Not on file   Other Topics Concern    Not on file   Social History Narrative    Not on file       MEDICATIONS:  Current Outpatient Medications   Medication Sig Dispense Refill    alendronate (FOSAMAX) 70 MG tablet Take 1 tablet by mouth every 7 days 12 tablet 3    CONTRAVE 8-90 MG per extended release tablet TAKE 1 TABLET BY MOUTH ONCE DAILY FOR 7 DAYS THEN 1 TWICE DAILY FOR 7 DAYS THEN 2 IN THE MORNING AND 1 IN THE EVENING FOR 7 DAYS THEN 2 IN T      ondansetron (ZOFRAN) 4 MG tablet Take 1 tablet by mouth 3 times daily as needed for Nausea or Vomiting 15 tablet 0    meloxicam (MOBIC) 15 MG tablet Take 15 mg by mouth daily      Calcium Citrate-Vitamin D (CALCIUM CITRATE + D PO) Take by mouth daily      MAGNESIUM CHLORIDE PO Take by mouth daily      Thiamine HCl (VITAMIN B-1) 250 MG tablet Take 250 mg by mouth daily      bisoprolol-hydrochlorothiazide (ZIAC) 5-6.25 MG per tablet Take 1 tablet by mouth daily       Potassium 99 MG TABS Take 99 mg by mouth daily       Biotin 5000 MCG CAPS Take 5,000 mcg by mouth daily       Ascorbic Acid (VITAMIN C) 1000 MG tablet Take 1,000 mg by mouth daily      Cholecalciferol (VITAMIN D3) 2000 units CAPS Take by mouth daily       docusate sodium (COLACE) 100 MG capsule Take 1 capsule by mouth daily as needed for Constipation 20 capsule 0    SYMBICORT 160-4.5 MCG/ACT AERO Inhale 2 puffs into the lungs 2 times daily       SYNTHROID 50 MCG tablet Take 50 mcg by mouth Daily       omeprazole (PRILOSEC) 20 MG capsule Take 20 mg by mouth 2 times daily       Multiple Vitamins-Minerals (OCUVITE PO) Take by mouth daily       FLUoxetine (PROZAC) 20 MG capsule Take 20 mg by mouth daily.  losartan (COZAAR) 50 MG tablet Take 50 mg by mouth 2 times daily.  hydrOXYzine (ATARAX) 25 MG tablet Take 25 mg by mouth daily. No current facility-administered medications for this visit. ALLERGIES:  Allergies as of 09/28/2020 - Review Complete 09/28/2020   Allergen Reaction Noted    Nickel  07/09/2014    Oxycodone-acetaminophen  07/09/2014    Percocet [oxycodone-acetaminophen]  07/09/2014       Health Maintenance:  Health Maintenance Due   Topic Date Due    DTaP/Tdap/Td vaccine (1 - Tdap) 07/11/1974    Shingles Vaccine (1 of 2) 07/11/2005    A1C test (Diabetic or Prediabetic)  07/18/2018    TSH testing  06/22/2020    Pneumococcal 65+ years Vaccine (1 of 1 - PPSV23) 07/11/2020    Flu vaccine (1) 09/01/2020     Review Of Systems (11 point):  Constitutional: No fever, chills or malaise; No weight change or fatigue  Head and Eyes: No vision, Headache, Dizziness or trauma in last 12 months  ENT ROS: No hearing, Tinnitis, sinus or taste problems  Hematological and Lymphatic ROS:No Lymphoma, Von Willebrand's, Hemophillia or Bleeding History  Psych ROS: No Depression, Homicidal thoughts,suicidal thoughts, or anxiety  Breast ROS: No prior breast abnormalities or lumps  Respiratory ROS: No SOB, Pneumoniae,Cough, or Pulmonary Embolism History  Cardiovascular ROS: No Chest Pain with Exertion, Palpitations, Syncope, Edema, Arrhythmia  Gastrointestinal ROS: No Indigestion, Heartburn, Nausea, vomiting, Diarrhea, Constipation,or Bowel Changes; No Bloody Stools or melena  Genito-Urinary ROS: No Dysuria, Hematuria or Nocturia.  No Urinary Incontinence or Vaginal Discharge  Musculoskeletal ROS: No Arthralgia, Arthritis,Gout,Osteoporosis or Rheumatism  Neurological ROS: No CVA, Migraines, Epilepsy, Seizure Hx, or Limb Weakness  Dermatological ROS: No Rash, Itching, Hives, Mole Changes or Cancer                                                                                                                                                                                                                                  PHYSICAL Exam:     Constitutional:  Vitals:    09/28/20 1409   BP: 130/78   Site: Right Upper Arm   Position: Sitting   Cuff Size: Large Adult   Temp: 97.3 °F (36.3 °C)   Weight: 214 lb (97.1 kg)   Height: 5' 0.75\" (1.543 m)         General Appearance: This  is a well Developed, well Nourished, well groomed female. Skin:  There was a Normal Inspection of the skin without rashes or lesions. Extremities: The patients extremities were without calf tenderness, edema, or varicosities. Abdomen: The abdomen was soft and non-tender. There were good bowel sounds in all quadrants and there was no guarding, rebound or rigidity. Psych: The patient had a normal Orientation to: Time, Place, Person, and Situation  Breast:  (Chest)  normal appearance, no masses or tenderness  Pelvic Exam:  Vulva and vagina appear normal. Bimanual exam reveals normal uterus and adnexa. +++ grade IV rectocele protruding 0.5 cm past vaginal introitus, tissue intact with no erosions or ulcers   Musculosk:  Normal Gait and station was noted. ASSESSMENT:      72 y.o. Annual   Diagnosis Orders   1. Well female exam with routine gynecological exam  PAP SMEAR   2. Encounter for screening mammogram for malignant neoplasm of breast  CARINA DIGITAL SCREEN W OR WO CAD BILATERAL   3. Screening for human papillomavirus  PAP SMEAR   4. Other primary ovarian failure  DEXA BONE DENSITY 2 SITES   5.  Osteopenia, unspecified location  DEXA BONE DENSITY 2 SITES          Chief Complaint   Patient presents with    Annual Exam          Past Medical History:   Diagnosis Date    Asthma     BMI 37.0-37.9, adult 11/30/2018    Depression     GERD (gastroesophageal reflux disease)     Glaucoma associated with anomalies of iris     had surgery, no eye drops    High blood triglycerides 02/21/2018    191 on     Hypertension     Hypothyroidism     Left elbow fracture     no surgery    Obesity     Open abdominal wall wound 10/7/2015    Osteopenia     Tendonitis     Unspecified sleep apnea     CPAP    Urinary incontinence     Vision abnormalities     glasses    Vitamin B1 deficiency     Vitamin D deficiency          Patient Active Problem List    Diagnosis Date Noted    Osteopenia 12/16/2014     Priority: High     Rt Hip 2013 Dexa      S/P partial gastrectomy 12/16/2014     Priority: High     1989      S/P abdominoplasty 12/16/2014     Priority: High    HTN (hypertension) 07/09/2014     Priority: High    Hypothyroid 12/16/2014     Priority: Medium    Depression 12/16/2014     Priority: Medium    Osteoporosis ( Mother) 12/16/2014     Priority: Medium     Last DEXA 2013      Asthma 12/16/2014     Priority: Medium    Rectocele 3 12/16/2014     Priority: Medium     Pessary Declined      Vaginal enterocele 1 12/16/2014     Priority: Medium    NORMAN (stress urinary incontinence, female) 12/16/2014     Priority: Medium     Urology Referral      Sleep apnea 07/09/2014     Priority: Medium    GERD (gastroesophageal reflux disease) 07/09/2014     Priority: Medium    Vision abnormalities      Priority: Low     glasses      Right carpal tunnel syndrome 03/09/2020    BMI 37.0-37.9, adult 11/30/2018    Abdominal wall ulcer, with fat layer exposed ([address] 07/31/2018    Prediabetes 07/19/2017    Chronic pain of right knee 07/14/2017    Open abdominal wall wound 10/07/2015            PLAN:  Annual exam performed  Cervical cytology collected  Rx mammogram  Rx DEXA  rx fosamax  RTO one year annual exam  .  Orders Placed This Encounter   Procedures    CARINA DIGITAL SCREEN W OR WO CAD BILATERAL     Standing Status:   Future     Standing Expiration Date: 2021     Order Specific Question:   Reason for exam:     Answer:   screening for malinant neoplasm of breast    DEXA BONE DENSITY 2 SITES     Standing Status:   Future     Standing Expiration Date:   2021     Order Specific Question:   Reason for exam:     Answer:   other ovarian failure     Order Specific Question:   Reason for exam:     Answer:   osteopenia    PAP SMEAR     Patient History:    Patient's last menstrual period was 2010 (approximate). OBGYN Status: Postmenopausal  Past Surgical History:  : ABDOMINOPLASTY  9-16-15: ABSCESS DRAINAGE      Comment:  abd. wall abscess  : BUNIONECTOMY; Bilateral  2020: CARPAL TUNNEL RELEASE; Right      Comment:  OPEN CARPAL TUNNEL RELEASE performed by Juan Herrera MD at [address]  : CATARACT REMOVAL; Bilateral  : CHOLECYSTECTOMY  : COLONOSCOPY      Comment:  negative, repeat in 10 years  No date: ENDOSCOPY, COLON, DIAGNOSTIC  No date: EYE SURGERY      Comment:  see below other history  2007: GLAUCOMA SURGERY  2009: KNEE ARTHROSCOPY; Right      Comment:  torn meniscus  2018: AR EXPLORE WOUND,ABDOMEN/FLANK/BACK; Left      Comment:  WOUND EXPLORATION W/WOUND VAC PLACEMENT / I&D performed                by Keenan Silva DO at [address]  2018: AR SHLDR ARTHROSCOP,SURG,W/ROTAT CUFF REPR; Right      Comment:  SHOULDER ARTHROSCOPY ROTATOR CUFF REPAIR performed by                Juan Herrera MD at [address] [place] [address] Street: [address]; Right  1989: STOMACH SURGERY      Comment:  stomach stapling, [facility]  No date: TONSILLECTOMY      Comment:  3 y.o. No date: WISDOM TOOTH EXTRACTION      Social History    Tobacco Use      Smoking status: Former Smoker        Quit date: 2013        Years since quittin.2      Smokeless tobacco: Never Used       Standing Status:   Future     Standing Expiration Date:   2020     Order Specific Question:   Collection Type     Answer:    Thin Prep     Order Specific Question:   Prior Abnormal Pap Test     Answer:   No     Order Specific Question:   Screening or Diagnostic     Answer:   Screening     Order Specific Question:   HPV Requested?      Answer:   Yes     Order Specific Question:   High Risk Patient     Answer:   N/A

## 2020-10-02 LAB
HPV SOURCE: NORMAL
HPV, GENOTYPE 16: NOT DETECTED
HPV, GENOTYPE 18: NOT DETECTED
HPV, HIGH RISK OTHER: NOT DETECTED

## 2020-10-03 ENCOUNTER — HOSPITAL ENCOUNTER (OUTPATIENT)
Age: 65
Discharge: HOME | End: 2020-10-03
Payer: COMMERCIAL

## 2020-10-03 LAB
ABSOLUTE EOS #: 0.4 K/UL (ref 0–0.4)
ABSOLUTE IMMATURE GRANULOCYTE: ABNORMAL K/UL (ref 0–0.3)
ABSOLUTE LYMPH #: 1.6 K/UL (ref 1–4.8)
ABSOLUTE MONO #: 0.6 K/UL (ref 0.1–1.3)
ALBUMIN SERPL-MCNC: 4.2 G/DL (ref 3.5–5.2)
ALBUMIN/GLOBULIN RATIO: ABNORMAL (ref 1–2.5)
ALP BLD-CCNC: 73 U/L (ref 35–104)
ALT SERPL-CCNC: 20 U/L (ref 5–33)
ANION GAP SERPL CALCULATED.3IONS-SCNC: 12 MMOL/L (ref 9–17)
AST SERPL-CCNC: 20 U/L
BASOPHILS # BLD: 0 % (ref 0–2)
BASOPHILS ABSOLUTE: 0 K/UL (ref 0–0.2)
BILIRUB SERPL-MCNC: 0.26 MG/DL (ref 0.3–1.2)
BILIRUBIN URINE: NEGATIVE
BUN BLDV-MCNC: 22 MG/DL (ref 8–23)
BUN/CREAT BLD: ABNORMAL (ref 9–20)
CALCIUM SERPL-MCNC: 10.1 MG/DL (ref 8.6–10.4)
CHLORIDE BLD-SCNC: 102 MMOL/L (ref 98–107)
CHOLESTEROL/HDL RATIO: 3.2
CHOLESTEROL: 168 MG/DL
CO2: 27 MMOL/L (ref 20–31)
COLOR: YELLOW
COMMENT UA: NORMAL
CREAT SERPL-MCNC: 0.74 MG/DL (ref 0.5–0.9)
DIFFERENTIAL TYPE: ABNORMAL
EOSINOPHILS RELATIVE PERCENT: 5 % (ref 0–4)
GFR AFRICAN AMERICAN: >60 ML/MIN
GFR NON-AFRICAN AMERICAN: >60 ML/MIN
GFR SERPL CREATININE-BSD FRML MDRD: ABNORMAL ML/MIN/{1.73_M2}
GFR SERPL CREATININE-BSD FRML MDRD: ABNORMAL ML/MIN/{1.73_M2}
GLUCOSE BLD-MCNC: 100 MG/DL (ref 70–99)
GLUCOSE URINE: NEGATIVE
HCT VFR BLD CALC: 39.2 % (ref 36–46)
HDLC SERPL-MCNC: 53 MG/DL
HEMOGLOBIN: 12.7 G/DL (ref 12–16)
IMMATURE GRANULOCYTES: ABNORMAL %
KETONES, URINE: NEGATIVE
LDL CHOLESTEROL: 89 MG/DL (ref 0–130)
LEUKOCYTE ESTERASE, URINE: NEGATIVE
LYMPHOCYTES # BLD: 22 % (ref 24–44)
MCH RBC QN AUTO: 28.6 PG (ref 26–34)
MCHC RBC AUTO-ENTMCNC: 32.4 G/DL (ref 31–37)
MCV RBC AUTO: 88.5 FL (ref 80–100)
MONOCYTES # BLD: 8 % (ref 1–7)
NITRITE, URINE: NEGATIVE
NRBC AUTOMATED: ABNORMAL PER 100 WBC
PDW BLD-RTO: 16.6 % (ref 11.5–14.9)
PH UA: 6.5 (ref 5–8)
PLATELET # BLD: 280 K/UL (ref 150–450)
PLATELET ESTIMATE: ABNORMAL
PMV BLD AUTO: 7.5 FL (ref 6–12)
POTASSIUM SERPL-SCNC: 4.6 MMOL/L (ref 3.7–5.3)
PROTEIN UA: NEGATIVE
RBC # BLD: 4.43 M/UL (ref 4–5.2)
RBC # BLD: ABNORMAL 10*6/UL
SEG NEUTROPHILS: 65 % (ref 36–66)
SEGMENTED NEUTROPHILS ABSOLUTE COUNT: 4.9 K/UL (ref 1.3–9.1)
SODIUM BLD-SCNC: 141 MMOL/L (ref 135–144)
SPECIFIC GRAVITY UA: 1.01 (ref 1–1.03)
THYROXINE, FREE: 1.39 NG/DL (ref 0.93–1.7)
TOTAL PROTEIN: 7.2 G/DL (ref 6.4–8.3)
TRIGL SERPL-MCNC: 132 MG/DL
TSH SERPL DL<=0.05 MIU/L-ACNC: 0.35 MIU/L (ref 0.3–5)
TURBIDITY: CLEAR
URINE HGB: NEGATIVE
UROBILINOGEN, URINE: NORMAL
VITAMIN D 25-HYDROXY: 97.5 NG/ML (ref 30–100)
VLDLC SERPL CALC-MCNC: NORMAL MG/DL (ref 1–30)
WBC # BLD: 7.4 K/UL (ref 3.5–11)
WBC # BLD: ABNORMAL 10*3/UL

## 2020-10-03 PROCEDURE — 84443 ASSAY THYROID STIM HORMONE: CPT

## 2020-10-03 PROCEDURE — 80053 COMPREHEN METABOLIC PANEL: CPT

## 2020-10-03 PROCEDURE — 80061 LIPID PANEL: CPT

## 2020-10-03 PROCEDURE — 84439 ASSAY OF FREE THYROXINE: CPT

## 2020-10-03 PROCEDURE — 85025 COMPLETE CBC W/AUTO DIFF WBC: CPT

## 2020-10-03 PROCEDURE — 81003 URINALYSIS AUTO W/O SCOPE: CPT

## 2020-10-03 PROCEDURE — 82306 VITAMIN D 25 HYDROXY: CPT

## 2020-10-03 PROCEDURE — 36415 COLL VENOUS BLD VENIPUNCTURE: CPT

## 2020-10-06 LAB — CYTOLOGY REPORT: NORMAL

## 2020-10-15 ENCOUNTER — OFFICE VISIT (OUTPATIENT)
Dept: ORTHOPEDIC SURGERY | Age: 65
End: 2020-10-15
Payer: COMMERCIAL

## 2020-10-15 PROCEDURE — 99213 OFFICE O/P EST LOW 20 MIN: CPT | Performed by: ORTHOPAEDIC SURGERY

## 2020-10-15 NOTE — PROGRESS NOTES
Magdiel Dyson M.D.            [address] [place] [address]., [address] [facility],[address], [address] [facility]           Dept Phone: [phone]           Dept Fax:  [address], [place]          Dept Phone: [phone]           Dept Fax:  [phone]      Chief Compliant:  Chief Complaint   Patient presents with    Pain     Lt knee         History of Present Illness: This is a 72 y.o. female who presents to the clinic today for evaluation / follow up of knee pain. Patient states that during Labor Day weekend she was lifting a coffee table and kind of turned and twisted and felt a snap in her left knee. Since that time she has had intermittent catching locking sensation of her left knee. She states that is starting to affect her daily activities. Occasionally will swell on her. .       Review of Systems   Constitutional: Negative for fever, chills, sweats. Eyes: Negative for changes in vision, or pain. HENT: Negative for ear ache, epistaxis, or sore throat. Respiratory/Cardio: Negative for Chest pain, palpitations, SOB, or cough. Gastrointestinal: Negative for abdominal pain, N/V/D. Genitourinary: Negative for dysuria, frequency, urgency, or hematuria. Neurological: Negative for headache, numbness, or weakness. Integumentary: Negative for rash, itching, laceration, or abrasion. Musculoskeletal: Positive for Pain (Lt knee )       Physical Exam:  Constitutional: Patient is oriented to person, place, and time. Patient appears well-developed and well nourished. HENT: Negative otherwise noted  Head: Normocephalic and Atraumatic  Nose: Normal  Eyes: Conjunctivae and EOM are normal  Neck: Normal range of motion Neck supple. Respiratory/Cardio: Effort normal. No respiratory distress.   Musculoskeletal: Examination of patient's left knee notes that she has decent motion 0-120 degrees. Collaterals and cruciates appear to be okay. She does have some lateral joint line tenderness with a mildly positive Rick's laterally. Neurological: Patient is alert and oriented to person, place, and time. Normal strenght. No sensory deficit. Skin: Skin is warm and dry  Psychiatric: Behavior is normal. Thought content normal.  Nursing note and vitals reviewed. Labs and Imaging:     XR taken today:  Xr Knee Bilateral Standing    Result Date: 10/15/2020  X-rays taken today reviewed by me show standing AP of both knees. Patient has fairly moderate to early severe lateral joint space narrowing of her right knee. No acute process is noted. The left knee has some modest early moderate lateral joint space narrowing of the left knee. Overall valgus disposition of both knees right greater than left. No acute process noted          Orders Placed This Encounter   Procedures    MRI KNEE LEFT WO CONTRAST     Standing Status:   Future     Standing Expiration Date:   10/15/2021       Assessment and Plan:  1. Chronic pain of left knee    2. DJD both knees right greater than left  3. Likely meniscus tear left knee        This is a 72 y.o. female who presents to the clinic today for evaluation / follow up of left knee pain.      Past History:    Current Outpatient Medications:     CONTRAVE 8-90 MG per extended release tablet, TAKE 1 TABLET BY MOUTH ONCE DAILY FOR 7 DAYS THEN 1 TWICE DAILY FOR 7 DAYS THEN 2 IN THE MORNING AND 1 IN THE EVENING FOR 7 DAYS THEN 2 IN T, Disp: , Rfl:     alendronate (FOSAMAX) 70 MG tablet, Take 1 tablet by mouth every 7 days, Disp: 12 tablet, Rfl: 3    ondansetron (ZOFRAN) 4 MG tablet, Take 1 tablet by mouth 3 times daily as needed for Nausea or Vomiting, Disp: 15 tablet, Rfl: 0    meloxicam (MOBIC) 15 MG tablet, Take 15 mg by mouth daily, Disp: , Rfl:     Calcium Citrate-Vitamin D (CALCIUM CITRATE + D PO), Take by mouth daily, Disp: , Rfl:    MAGNESIUM CHLORIDE PO, Take by mouth daily, Disp: , Rfl:     Thiamine HCl (VITAMIN B-1) 250 MG tablet, Take 250 mg by mouth daily, Disp: , Rfl:     bisoprolol-hydrochlorothiazide (ZIAC) 5-6.25 MG per tablet, Take 1 tablet by mouth daily , Disp: , Rfl:     Potassium 99 MG TABS, Take 99 mg by mouth daily , Disp: , Rfl:     Biotin 5000 MCG CAPS, Take 5,000 mcg by mouth daily , Disp: , Rfl:     Ascorbic Acid (VITAMIN C) 1000 MG tablet, Take 1,000 mg by mouth daily, Disp: , Rfl:     Cholecalciferol (VITAMIN D3) 2000 units CAPS, Take by mouth daily , Disp: , Rfl:     docusate sodium (COLACE) 100 MG capsule, Take 1 capsule by mouth daily as needed for Constipation, Disp: 20 capsule, Rfl: 0    SYMBICORT 160-4.5 MCG/ACT AERO, Inhale 2 puffs into the lungs 2 times daily , Disp: , Rfl:     SYNTHROID 50 MCG tablet, Take 50 mcg by mouth Daily , Disp: , Rfl:     omeprazole (PRILOSEC) 20 MG capsule, Take 20 mg by mouth 2 times daily , Disp: , Rfl:     Multiple Vitamins-Minerals (OCUVITE PO), Take by mouth daily , Disp: , Rfl:     FLUoxetine (PROZAC) 20 MG capsule, Take 20 mg by mouth daily. , Disp: , Rfl:     losartan (COZAAR) 50 MG tablet, Take 50 mg by mouth 2 times daily. , Disp: , Rfl:     hydrOXYzine (ATARAX) 25 MG tablet, Take 25 mg by mouth daily. , Disp: , Rfl:   Allergies   Allergen Reactions    Nickel      In foods    Oxycodone-Acetaminophen     Percocet [Oxycodone-Acetaminophen]      Social History     Socioeconomic History    Marital status:      Spouse name: Not on file    Number of children: Not on file    Years of education: Not on file    Highest education level: Not on file   Occupational History    Not on file   Social Needs    Financial resource strain: Not on file    Food insecurity     Worry: Not on file     Inability: Not on file    Transportation needs     Medical: Not on file     Non-medical: Not on file   Tobacco Use    Smoking status: Former Smoker     Last attempt to quit: 2013     Years since quittin.3    Smokeless tobacco: Never Used   Substance and Sexual Activity    Alcohol use:  Yes     Alcohol/week: 4.0 standard drinks     Types: 4 Standard drinks or equivalent per week     Comment: occasionally weekends    Drug use: No    Sexual activity: Yes     Partners: Male     Birth control/protection: Post-menopausal   Lifestyle    Physical activity     Days per week: Not on file     Minutes per session: Not on file    Stress: Not on file   Relationships    Social connections     Talks on phone: Not on file     Gets together: Not on file     Attends Worship service: Not on file     Active member of club or organization: Not on file     Attends meetings of clubs or organizations: Not on file     Relationship status: Not on file    Intimate partner violence     Fear of current or ex partner: Not on file     Emotionally abused: Not on file     Physically abused: Not on file     Forced sexual activity: Not on file   Other Topics Concern    Not on file   Social History Narrative    Not on file     Past Medical History:   Diagnosis Date    Asthma     BMI 37.0-37.9, adult 2018    Depression     GERD (gastroesophageal reflux disease)     Glaucoma associated with anomalies of iris     had surgery, no eye drops    High blood triglycerides 2018    191 on     Hypertension     Hypothyroidism     Left elbow fracture     no surgery    Obesity     Open abdominal wall wound 10/7/2015    Osteopenia     Tendonitis     Unspecified sleep apnea     CPAP    Urinary incontinence     Vision abnormalities     glasses    Vitamin B1 deficiency     Vitamin D deficiency      Past Surgical History:   Procedure Laterality Date    ABDOMINOPLASTY      ABSCESS DRAINAGE  9-16-15    abd. wall abscess    BUNIONECTOMY Bilateral 2003    CARPAL TUNNEL RELEASE Right 2020    OPEN CARPAL TUNNEL RELEASE performed by Godfrey Costa MD at [address] [place] [address] Bilateral 2007    CHOLECYSTECTOMY  2003    COLONOSCOPY  2012    negative, repeat in 10 years    ENDOSCOPY, COLON, DIAGNOSTIC      EYE SURGERY      see below other history    GLAUCOMA SURGERY  2007    KNEE ARTHROSCOPY Right 2009    torn meniscus    [place] EXPLORE WOUND,ABDOMEN/FLANK/BACK Left 11/5/2018    WOUND EXPLORATION W/WOUND VAC PLACEMENT / I&D performed by aHzel Self DO at [address] [facility] ARTHROSCOP,SURG,W/ROTAT CUFF REPR Right 6/11/2018    SHOULDER ARTHROSCOPY ROTATOR CUFF REPAIR performed by Nikhil Gaviria MD at [address] [facility]    stomach stapling, [facility]    TONSILLECTOMY      1 y.o.    WISDOM TOOTH EXTRACTION       Family History   Problem Relation Age of Onset    Diabetes Maternal Grandmother     COPD Mother     Rheum Arthritis Mother     Osteoarthritis Mother     Osteoporosis Mother     Other Mother         AAA    High Blood Pressure Mother     Glaucoma Father     Arthritis Father     Cancer Father         throat cancer twice    Plan  Patient will be scheduled for an MRI to her left knee. If this is indeed positive nothing the patient would benefit from arthroscopic evaluation. We will get her scheduled accordingly. Provider Attestation:  Maru Eden, personally performed the services described in this documentation. All medical record entries made by the scribe were at my direction and in my presence. I have reviewed the chart and discharge instructions and agree that the records reflect my personal performance and is accurate and complete. Debbie Tavares MD. 10/15/20      Please note that this chart was generated using voice recognition Dragon dictation software. Although every effort was made to ensure the accuracy of this automated transcription, some errors in transcription may have occurred.

## 2020-10-23 ENCOUNTER — TELEPHONE (OUTPATIENT)
Dept: ORTHOPEDIC SURGERY | Age: 65
End: 2020-10-23

## 2020-10-24 ENCOUNTER — HOSPITAL ENCOUNTER (OUTPATIENT)
Dept: MRI IMAGING | Age: 65
Discharge: HOME | End: 2020-10-26
Payer: COMMERCIAL

## 2020-10-24 PROCEDURE — 73721 MRI JNT OF LWR EXTRE W/O DYE: CPT

## 2020-10-26 ENCOUNTER — TELEPHONE (OUTPATIENT)
Dept: ORTHOPEDIC SURGERY | Age: 65
End: 2020-10-26

## 2020-10-27 ENCOUNTER — TELEPHONE (OUTPATIENT)
Dept: ORTHOPEDIC SURGERY | Age: 65
End: 2020-10-27

## 2020-10-27 NOTE — TELEPHONE ENCOUNTER
10/27/20: Called patient to relay MRI results and recommendations from Dr. Viki Nunez. Patient wishes to schedule to discuss the scope recommendation. Made appointment for 11/2/20 at 1:55 pm in [place] office.

## 2020-10-27 NOTE — TELEPHONE ENCOUNTER
----- Message from Janice Calles MD sent at 10/25/2020 11:25 AM EDT -----  Tears of the lateral and possibly the medial meniscus.  Scope if she is symptomatic

## 2020-11-05 ENCOUNTER — HOSPITAL ENCOUNTER (OUTPATIENT)
Dept: PREADMISSION TESTING | Age: 65
Discharge: HOME | End: 2020-11-09
Payer: COMMERCIAL

## 2020-11-05 VITALS
HEART RATE: 69 BPM | SYSTOLIC BLOOD PRESSURE: 139 MMHG | TEMPERATURE: 97.8 F | DIASTOLIC BLOOD PRESSURE: 84 MMHG | HEIGHT: 61 IN | WEIGHT: 207 LBS | OXYGEN SATURATION: 98 % | RESPIRATION RATE: 16 BRPM | BODY MASS INDEX: 39.08 KG/M2

## 2020-11-05 LAB
ABSOLUTE EOS #: 0.5 K/UL (ref 0–0.4)
ABSOLUTE IMMATURE GRANULOCYTE: ABNORMAL K/UL (ref 0–0.3)
ABSOLUTE LYMPH #: 2.5 K/UL (ref 1–4.8)
ABSOLUTE MONO #: 0.6 K/UL (ref 0.1–1.3)
ANION GAP SERPL CALCULATED.3IONS-SCNC: 10 MMOL/L (ref 9–17)
BASOPHILS # BLD: 0 % (ref 0–2)
BASOPHILS ABSOLUTE: 0 K/UL (ref 0–0.2)
BUN BLDV-MCNC: 26 MG/DL (ref 8–23)
BUN/CREAT BLD: ABNORMAL (ref 9–20)
CALCIUM SERPL-MCNC: 9.6 MG/DL (ref 8.6–10.4)
CHLORIDE BLD-SCNC: 101 MMOL/L (ref 98–107)
CO2: 27 MMOL/L (ref 20–31)
CREAT SERPL-MCNC: 0.73 MG/DL (ref 0.5–0.9)
DIFFERENTIAL TYPE: ABNORMAL
EOSINOPHILS RELATIVE PERCENT: 6 % (ref 0–4)
GFR AFRICAN AMERICAN: >60 ML/MIN
GFR NON-AFRICAN AMERICAN: >60 ML/MIN
GFR SERPL CREATININE-BSD FRML MDRD: ABNORMAL ML/MIN/{1.73_M2}
GFR SERPL CREATININE-BSD FRML MDRD: ABNORMAL ML/MIN/{1.73_M2}
GLUCOSE BLD-MCNC: 102 MG/DL (ref 70–99)
HCT VFR BLD CALC: 40.2 % (ref 36–46)
HEMOGLOBIN: 13.4 G/DL (ref 12–16)
IMMATURE GRANULOCYTES: ABNORMAL %
LYMPHOCYTES # BLD: 31 % (ref 24–44)
MCH RBC QN AUTO: 29.8 PG (ref 26–34)
MCHC RBC AUTO-ENTMCNC: 33.3 G/DL (ref 31–37)
MCV RBC AUTO: 89.4 FL (ref 80–100)
MONOCYTES # BLD: 7 % (ref 1–7)
NRBC AUTOMATED: ABNORMAL PER 100 WBC
PDW BLD-RTO: 16.3 % (ref 11.5–14.9)
PLATELET # BLD: 275 K/UL (ref 150–450)
PLATELET ESTIMATE: ABNORMAL
PMV BLD AUTO: 7.6 FL (ref 6–12)
POTASSIUM SERPL-SCNC: 4.1 MMOL/L (ref 3.7–5.3)
RBC # BLD: 4.49 M/UL (ref 4–5.2)
RBC # BLD: ABNORMAL 10*6/UL
SEG NEUTROPHILS: 56 % (ref 36–66)
SEGMENTED NEUTROPHILS ABSOLUTE COUNT: 4.7 K/UL (ref 1.3–9.1)
SODIUM BLD-SCNC: 138 MMOL/L (ref 135–144)
WBC # BLD: 8.4 K/UL (ref 3.5–11)
WBC # BLD: ABNORMAL 10*3/UL

## 2020-11-05 PROCEDURE — 85025 COMPLETE CBC W/AUTO DIFF WBC: CPT

## 2020-11-05 PROCEDURE — 93005 ELECTROCARDIOGRAM TRACING: CPT | Performed by: ANESTHESIOLOGY

## 2020-11-05 PROCEDURE — 80048 BASIC METABOLIC PNL TOTAL CA: CPT

## 2020-11-05 PROCEDURE — 36415 COLL VENOUS BLD VENIPUNCTURE: CPT

## 2020-11-05 NOTE — H&P
HISTORY and [id number]       NAME:  Wilfredo Bonilla  MRN: [id number]   YOB: 1955   Date: 11/5/2020   Age: 72 y.o. Gender: female       COMPLAINT AND PRESENT HISTORY:     Wilfredo Bonilla is 72 y.o.,  female, undergoing preadmission testing for left knee medial and lateral meniscus tear with scheduled left knee arthroscopy partial medial and lateral meniscectomy. Symptoms started in early September when she lifted a small table and twisted. She felt a mild pain in her left knee afterward. Pain has since worsened progressively. Has tried physical therapy with no improvement of symptoms. Pt c/o pain, swelling, stiffness, popping and cracking in the left knee. Describes the pain as intermittent. Rating pain 6/10 in intensity at times. Takes acetaminophen and aleve with moderate relief. Walking and standing for prolonged periods aggravates pain. Ice helps alleviate pain. Pain does radiate down anterior left leg to mid lower leg and up into left hip. Numbness and tingling to anterior left lower leg at times. The knee does not buckle, give way under the patient. No locking up. No recent falls, injury or trauma. No redness, swelling or rashes. No Hx of MRSA infections in the past.    PMHx  Asthma: Takes Symbicort. Denies SOB or cough. HTN: Well controlled. Takes ziac, losartan. Denies chest pain/pressure, palpitations, headaches, dizziness or changes in vision. Sleep apnea: Wears CPAP at night. Denies complications with anesthesia with past procedures.      PAST MEDICAL HISTORY     Past Medical History:   Diagnosis Date    Asthma     BMI 37.0-37.9, adult 11/30/2018    Depression     GERD (gastroesophageal reflux disease)     Glaucoma associated with anomalies of iris     had surgery, no eye drops    High blood triglycerides 02/21/2018    191 on     Hypertension     Hypothyroidism     Left elbow fracture     no surgery    Obesity     Open abdominal wall wound education: None    Highest education level: None   Occupational History    None   Social Needs    Financial resource strain: None    Food insecurity     Worry: None     Inability: None    Transportation needs     Medical: None     Non-medical: None   Tobacco Use    Smoking status: Former Smoker     Last attempt to quit: 2013     Years since quittin.3    Smokeless tobacco: Never Used   Substance and Sexual Activity    Alcohol use:  Yes     Alcohol/week: 4.0 standard drinks     Types: 4 Standard drinks or equivalent per week     Comment: occasionally weekends    Drug use: No    Sexual activity: Yes     Partners: Male     Birth control/protection: Post-menopausal   Lifestyle    Physical activity     Days per week: None     Minutes per session: None    Stress: None   Relationships    Social connections     Talks on phone: None     Gets together: None     Attends Yazdanism service: None     Active member of club or organization: None     Attends meetings of clubs or organizations: None     Relationship status: None    Intimate partner violence     Fear of current or ex partner: None     Emotionally abused: None     Physically abused: None     Forced sexual activity: None   Other Topics Concern    None   Social History Narrative    None        REVIEW OF SYSTEMS      Allergies   Allergen Reactions    Nickel      In foods    Oxycodone-Acetaminophen     Percocet [Oxycodone-Acetaminophen]        Current Outpatient Medications on File Prior to Encounter   Medication Sig Dispense Refill    CONTRAVE 8-90 MG per extended release tablet TAKE 1 TABLET BY MOUTH ONCE DAILY FOR 7 DAYS THEN 1 TWICE DAILY FOR 7 DAYS THEN 2 IN THE MORNING AND 1 IN THE EVENING FOR 7 DAYS THEN 2 IN T      alendronate (FOSAMAX) 70 MG tablet Take 1 tablet by mouth every 7 days 12 tablet 3    meloxicam (MOBIC) 15 MG tablet Take 15 mg by mouth daily      Calcium Citrate-Vitamin D (CALCIUM CITRATE + D PO) Take by mouth daily      16   Ht 5' 1\" (1.549 m)   Wt 207 lb (93.9 kg)   LMP 07/01/2010 (Approximate)   SpO2 98%   BMI 39.11 kg/m²  Body mass index is 39.11 kg/m². GENERAL APPEARANCE:   Prudence Soto is 72 y.o.,  female, nourished, conscious, alert. Does not appear to be in distress or pain at this time. SKIN:  Warm, dry, no cyanosis or jaundice. HEAD:  Normocephalic, atraumatic. EYES:  Pupils equal, reactive to light. EARS:  No discharge, no marked hearing loss. NOSE:  No rhinorrhea, epistaxis or septal deformity. THROAT:  Not congested. No ulceration bleeding or discharge. NECK:  No stiffness, trachea central.  No palpable masses or L.N.                 CHEST:  Symmetrical and equal on expansion. HEART:  RRR S1 > S2. No audible murmurs or gallops. LUNGS:  Equal on expansion, normal breath sounds. No wheezing, rhonchi or rales. ABDOMEN:  Soft on palpation. No localized tenderness. No guarding or rigidity. LYMPHATICS:  No palpable cervical lymphadenopathy. LOCOMOTOR, BACK AND SPINE:  No tenderness or deformities. EXTREMITIES:  Tenderness on palpation of the left knee joint space worse on the lateral aspect and posterior knee. Symmetrical, no pretibial edema. No calf tenderness. No discoloration or ulcerations. NEUROLOGIC:  The patient is conscious, alert, oriented. No facial drooping. Speech clear. No apparent focal sensory or motor deficits.                                                                                      PROVISIONAL DIAGNOSES / SURGERY:      LEFT MEDIAL & LATERAL MENISCUS TEAR    ARTHROSCOPY KNEE PARTIAL MEDIAL & LATERAL MENISCECTOMY  Left    Patient Active Problem List    Diagnosis Date Noted    Osteopenia 12/16/2014     Priority: High    S/P partial gastrectomy 12/16/2014     Priority: High    S/P abdominoplasty 12/16/2014     Priority: High    HTN (hypertension) 07/09/2014     Priority: High    Hypothyroid 12/16/2014     Priority: Medium    Depression 12/16/2014     Priority: Medium    Osteoporosis ( Mother) 12/16/2014     Priority: Medium    Asthma 12/16/2014     Priority: Medium    Rectocele 3 12/16/2014     Priority: Medium    Vaginal enterocele 1 12/16/2014     Priority: Medium    NORMAN (stress urinary incontinence, female) 12/16/2014     Priority: Medium    Sleep apnea 07/09/2014     Priority: Medium    GERD (gastroesophageal reflux disease) 07/09/2014     Priority: Medium    Vision abnormalities      Priority: Low    Right carpal tunnel syndrome 03/09/2020    BMI 37.0-37.9, adult 11/30/2018    Abdominal wall ulcer, with fat layer exposed ([facility] [address] 07/31/2018    Prediabetes 07/19/2017    Chronic pain of right knee 07/14/2017    Open abdominal wall wound 10/07/2015           KERRIE Kaplan - CNP on 11/5/2020 at 4:25 PM

## 2020-11-05 NOTE — H&P (VIEW-ONLY)
HISTORY and Edna [id number]       NAME:  Nataliya Caro  MRN: [id number]   YOB: 1955   Date: 11/5/2020   Age: 72 y.o. Gender: female       COMPLAINT AND PRESENT HISTORY:     Nataliya Caro is 72 y.o.,  female, undergoing preadmission testing for left knee medial and lateral meniscus tear with scheduled left knee arthroscopy partial medial and lateral meniscectomy. Symptoms started in early September when she lifted a small table and twisted. She felt a mild pain in her left knee afterward. Pain has since worsened progressively. Has tried physical therapy with no improvement of symptoms. Pt c/o pain, swelling, stiffness, popping and cracking in the left knee. Describes the pain as intermittent. Rating pain 6/10 in intensity at times. Takes acetaminophen and aleve with moderate relief. Walking and standing for prolonged periods aggravates pain. Ice helps alleviate pain. Pain does radiate down anterior left leg to mid lower leg and up into left hip. Numbness and tingling to anterior left lower leg at times. The knee does not buckle, give way under the patient. No locking up. No recent falls, injury or trauma. No redness, swelling or rashes. No Hx of MRSA infections in the past.    PMHx  Asthma: Takes Symbicort. Denies SOB or cough. HTN: Well controlled. Takes ziac, losartan. Denies chest pain/pressure, palpitations, headaches, dizziness or changes in vision. Sleep apnea: Wears CPAP at night. Denies complications with anesthesia with past procedures.      PAST MEDICAL HISTORY     Past Medical History:   Diagnosis Date    Asthma     BMI 37.0-37.9, adult 11/30/2018    Depression     GERD (gastroesophageal reflux disease)     Glaucoma associated with anomalies of iris     had surgery, no eye drops    High blood triglycerides 02/21/2018    191 on     Hypertension     Hypothyroidism     Left elbow fracture     no surgery    Obesity     Open abdominal wall wound 10/7/2015    Osteopenia     Tendonitis     Unspecified sleep apnea     CPAP    Urinary incontinence     Vision abnormalities     glasses    Vitamin B1 deficiency     Vitamin D deficiency     Wears glasses      Pt denies any history of Diabetes mellitus type 2, stroke, heart disease, COPD, HLD, Cancer, Seizures, Kidney Disease, Hepatitis, TB, Psychiatric Disorders or Substance abuse.     SURGICAL HISTORY       Past Surgical History:   Procedure Laterality Date    ABDOMINOPLASTY  1995    ABSCESS DRAINAGE  9-16-15    abd. wall abscess    BUNIONECTOMY Bilateral 2003    CARPAL TUNNEL RELEASE Right 5/20/2020    OPEN CARPAL TUNNEL RELEASE performed by Fercho Skinner MD at [address] [place] [address] Bilateral 2007    CHOLECYSTECTOMY  2003    COLONOSCOPY  2012    negative, repeat in 10 years    ENDOSCOPY, COLON, DIAGNOSTIC      EYE SURGERY      see below other history    GLAUCOMA SURGERY  2007    KNEE ARTHROSCOPY Right 2009    torn meniscus    VT EXPLORE WOUND,ABDOMEN/FLANK/BACK Left 11/5/2018    WOUND EXPLORATION W/WOUND VAC PLACEMENT / I&D performed by Madhavi Natarajan DO at [facility] ARTHROSCOP,SURG,W/ROTAT CUFF REPR Right 6/11/2018    SHOULDER ARTHROSCOPY ROTATOR CUFF REPAIR performed by Fercho Skinner MD at [place] [id number] Right 1998    x 2    STOMACH SURGERY  1989    stomach stapling, [facility]    TONSILLECTOMY      3 y.o.    WISDOM TOOTH EXTRACTION         FAMILY HISTORY       Family History   Problem Relation Age of Onset    Diabetes Maternal Grandmother     COPD Mother     Rheum Arthritis Mother     Osteoarthritis Mother     Osteoporosis Mother     Other Mother         AAA    High Blood Pressure Mother     Glaucoma Father     Arthritis Father     Cancer Father         throat cancer twice        SOCIAL HISTORY       Social History     Socioeconomic History    Marital status:      Spouse name: None    Number of children: None    Years of education: None    Highest education level: None   Occupational History    None   Social Needs    Financial resource strain: None    Food insecurity     Worry: None     Inability: None    Transportation needs     Medical: None     Non-medical: None   Tobacco Use    Smoking status: Former Smoker     Last attempt to quit: 2013     Years since quittin.3    Smokeless tobacco: Never Used   Substance and Sexual Activity    Alcohol use:  Yes     Alcohol/week: 4.0 standard drinks     Types: 4 Standard drinks or equivalent per week     Comment: occasionally weekends    Drug use: No    Sexual activity: Yes     Partners: Male     Birth control/protection: Post-menopausal   Lifestyle    Physical activity     Days per week: None     Minutes per session: None    Stress: None   Relationships    Social connections     Talks on phone: None     Gets together: None     Attends Moravian service: None     Active member of club or organization: None     Attends meetings of clubs or organizations: None     Relationship status: None    Intimate partner violence     Fear of current or ex partner: None     Emotionally abused: None     Physically abused: None     Forced sexual activity: None   Other Topics Concern    None   Social History Narrative    None        REVIEW OF SYSTEMS      Allergies   Allergen Reactions    Nickel      In foods    Oxycodone-Acetaminophen     Percocet [Oxycodone-Acetaminophen]        Current Outpatient Medications on File Prior to Encounter   Medication Sig Dispense Refill    CONTRAVE 8-90 MG per extended release tablet TAKE 1 TABLET BY MOUTH ONCE DAILY FOR 7 DAYS THEN 1 TWICE DAILY FOR 7 DAYS THEN 2 IN THE MORNING AND 1 IN THE EVENING FOR 7 DAYS THEN 2 IN T      alendronate (FOSAMAX) 70 MG tablet Take 1 tablet by mouth every 7 days 12 tablet 3    meloxicam (MOBIC) 15 MG tablet Take 15 mg by mouth daily      Calcium Citrate-Vitamin D (CALCIUM CITRATE + D PO) Take by mouth daily      MAGNESIUM CHLORIDE PO Take by mouth daily      Thiamine HCl (VITAMIN B-1) 250 MG tablet Take 250 mg by mouth daily      bisoprolol-hydrochlorothiazide (ZIAC) 5-6.25 MG per tablet Take 1 tablet by mouth daily       Potassium 99 MG TABS Take 99 mg by mouth daily       Biotin 5000 MCG CAPS Take 5,000 mcg by mouth daily       Ascorbic Acid (VITAMIN C) 1000 MG tablet Take 1,000 mg by mouth daily      Cholecalciferol (VITAMIN D3) 2000 units CAPS Take by mouth daily       docusate sodium (COLACE) 100 MG capsule Take 1 capsule by mouth daily as needed for Constipation 20 capsule 0    SYMBICORT 160-4.5 MCG/ACT AERO Inhale 2 puffs into the lungs 2 times daily       SYNTHROID 50 MCG tablet Take 50 mcg by mouth Daily       omeprazole (PRILOSEC) 20 MG capsule Take 20 mg by mouth 2 times daily       Multiple Vitamins-Minerals (OCUVITE PO) Take by mouth daily       FLUoxetine (PROZAC) 20 MG capsule Take 20 mg by mouth daily.  losartan (COZAAR) 50 MG tablet Take 50 mg by mouth 2 times daily.  hydrOXYzine (ATARAX) 25 MG tablet Take 25 mg by mouth daily. No current facility-administered medications on file prior to encounter. General health:  Fairly good. No fever or chills. Skin:  No itching, redness or rash. HEENT:  No headache, epistaxis or sore throat. Neck:  No pain, stiffness or masses. Cardiovascular/Respiratory system:  No chest pain, palpitation or shortness of breath. Gastrointestinal tract: No abdominal pain, Dysphagia, nausea, vomiting, diarrhea or constipation. Genitourinary:  No burning on micturition. No hesitancy, urgency, frequency or discoloration of urine. Locomotor:  No bone or joint pains. No swelling. Neuropsychiatric:  No referable complaints.                  GENERAL PHYSICAL EXAM:     Vitals: /84   Pulse 69   Temp 97.8 °F (36.6 °C)   Resp 16   Ht 5' 1\" (1.549 m)   Wt 207 lb (93.9 kg)   LMP 07/01/2010 (Approximate)   SpO2 98%   BMI 39.11 kg/m²  Body mass index is 39.11 kg/m². GENERAL APPEARANCE:   Nataliya Caro is 72 y.o.,  female, nourished, conscious, alert. Does not appear to be in distress or pain at this time. SKIN:  Warm, dry, no cyanosis or jaundice. HEAD:  Normocephalic, atraumatic. EYES:  Pupils equal, reactive to light. EARS:  No discharge, no marked hearing loss. NOSE:  No rhinorrhea, epistaxis or septal deformity. THROAT:  Not congested. No ulceration bleeding or discharge. NECK:  No stiffness, trachea central.  No palpable masses or L.N.                 CHEST:  Symmetrical and equal on expansion. HEART:  RRR S1 > S2. No audible murmurs or gallops. LUNGS:  Equal on expansion, normal breath sounds. No wheezing, rhonchi or rales. ABDOMEN:  Soft on palpation. No localized tenderness. No guarding or rigidity. LYMPHATICS:  No palpable cervical lymphadenopathy. LOCOMOTOR, BACK AND SPINE:  No tenderness or deformities. EXTREMITIES:  Tenderness on palpation of the left knee joint space worse on the lateral aspect and posterior knee. Symmetrical, no pretibial edema. No calf tenderness. No discoloration or ulcerations. NEUROLOGIC:  The patient is conscious, alert, oriented. No facial drooping. Speech clear. No apparent focal sensory or motor deficits.                                                                                      PROVISIONAL DIAGNOSES / SURGERY:      LEFT MEDIAL & LATERAL MENISCUS TEAR    ARTHROSCOPY KNEE PARTIAL MEDIAL & LATERAL MENISCECTOMY  Left    Patient Active Problem List    Diagnosis Date Noted    Osteopenia 12/16/2014     Priority: High    S/P partial gastrectomy 12/16/2014     Priority: High    S/P abdominoplasty 12/16/2014     Priority: High    HTN (hypertension) 07/09/2014     Priority: High    Hypothyroid 12/16/2014     Priority: Medium    Depression 12/16/2014     Priority: Medium    Osteoporosis ( Mother) 12/16/2014     Priority: Medium    Asthma 12/16/2014     Priority: Medium    Rectocele 3 12/16/2014     Priority: Medium    Vaginal enterocele 1 12/16/2014     Priority: Medium    NORMAN (stress urinary incontinence, female) 12/16/2014     Priority: Medium    Sleep apnea 07/09/2014     Priority: Medium    GERD (gastroesophageal reflux disease) 07/09/2014     Priority: Medium    Vision abnormalities      Priority: Low    Right carpal tunnel syndrome 03/09/2020    BMI 37.0-37.9, adult 11/30/2018    Abdominal wall ulcer, with fat layer exposed ([facility] [address] 07/31/2018    Prediabetes 07/19/2017    Chronic pain of right knee 07/14/2017    Open abdominal wall wound 10/07/2015           Niru Rinaldi, APRN - CNP on 11/5/2020 at 4:25 PM

## 2020-11-06 LAB
EKG ATRIAL RATE: 70 BPM
EKG P AXIS: 30 DEGREES
EKG P-R INTERVAL: 116 MS
EKG Q-T INTERVAL: 408 MS
EKG QRS DURATION: 96 MS
EKG QTC CALCULATION (BAZETT): 440 MS
EKG R AXIS: 4 DEGREES
EKG T AXIS: 48 DEGREES
EKG VENTRICULAR RATE: 70 BPM

## 2020-11-06 PROCEDURE — 93010 ELECTROCARDIOGRAM REPORT: CPT | Performed by: INTERNAL MEDICINE

## 2020-11-11 ENCOUNTER — HOSPITAL ENCOUNTER (OUTPATIENT)
Dept: WOMENS IMAGING | Age: 65
Discharge: HOME | End: 2020-11-13
Payer: COMMERCIAL

## 2020-11-11 PROCEDURE — 77063 BREAST TOMOSYNTHESIS BI: CPT

## 2020-11-13 ENCOUNTER — HOSPITAL ENCOUNTER (OUTPATIENT)
Dept: PREADMISSION TESTING | Age: 65
Setting detail: SPECIMEN
Discharge: HOME | End: 2020-11-17
Payer: COMMERCIAL

## 2020-11-13 PROCEDURE — U0003 INFECTIOUS AGENT DETECTION BY NUCLEIC ACID (DNA OR RNA); SEVERE ACUTE RESPIRATORY SYNDROME CORONAVIRUS 2 (SARS-COV-2) (CORONAVIRUS DISEASE [COVID-19]), AMPLIFIED PROBE TECHNIQUE, MAKING USE OF HIGH THROUGHPUT TECHNOLOGIES AS DESCRIBED BY CMS-2020-01-R: HCPCS

## 2020-11-14 LAB
SARS-COV-2, RAPID: NORMAL
SARS-COV-2: NORMAL
SARS-COV-2: NOT DETECTED
SOURCE: NORMAL

## 2020-11-16 ENCOUNTER — ANESTHESIA EVENT (OUTPATIENT)
Dept: OPERATING ROOM | Age: 65
End: 2020-11-16
Payer: COMMERCIAL

## 2020-11-17 ENCOUNTER — ANESTHESIA (OUTPATIENT)
Dept: OPERATING ROOM | Age: 65
End: 2020-11-17
Payer: COMMERCIAL

## 2020-11-17 ENCOUNTER — HOSPITAL ENCOUNTER (OUTPATIENT)
Age: 65
Setting detail: OUTPATIENT SURGERY
Discharge: HOME | End: 2020-11-17
Attending: ORTHOPAEDIC SURGERY | Admitting: ORTHOPAEDIC SURGERY
Payer: COMMERCIAL

## 2020-11-17 VITALS
DIASTOLIC BLOOD PRESSURE: 81 MMHG | SYSTOLIC BLOOD PRESSURE: 128 MMHG | HEIGHT: 61 IN | RESPIRATION RATE: 16 BRPM | WEIGHT: 207 LBS | TEMPERATURE: 96.1 F | BODY MASS INDEX: 39.08 KG/M2 | HEART RATE: 70 BPM | OXYGEN SATURATION: 98 %

## 2020-11-17 VITALS — DIASTOLIC BLOOD PRESSURE: 77 MMHG | OXYGEN SATURATION: 99 % | SYSTOLIC BLOOD PRESSURE: 118 MMHG | TEMPERATURE: 98.6 F

## 2020-11-17 PROCEDURE — 7100000010 HC PHASE II RECOVERY - FIRST 15 MIN: Performed by: ORTHOPAEDIC SURGERY

## 2020-11-17 PROCEDURE — 3600000003 HC SURGERY LEVEL 3 BASE: Performed by: ORTHOPAEDIC SURGERY

## 2020-11-17 PROCEDURE — 7100000031 HC ASPR PHASE II RECOVERY - ADDTL 15 MIN: Performed by: ORTHOPAEDIC SURGERY

## 2020-11-17 PROCEDURE — 2709999900 HC NON-CHARGEABLE SUPPLY: Performed by: ORTHOPAEDIC SURGERY

## 2020-11-17 PROCEDURE — 3600000013 HC SURGERY LEVEL 3 ADDTL 15MIN: Performed by: ORTHOPAEDIC SURGERY

## 2020-11-17 PROCEDURE — 2500000003 HC RX 250 WO HCPCS

## 2020-11-17 PROCEDURE — 7100000030 HC ASPR PHASE II RECOVERY - FIRST 15 MIN: Performed by: ORTHOPAEDIC SURGERY

## 2020-11-17 PROCEDURE — 3700000000 HC ANESTHESIA ATTENDED CARE: Performed by: ORTHOPAEDIC SURGERY

## 2020-11-17 PROCEDURE — 7100000000 HC PACU RECOVERY - FIRST 15 MIN: Performed by: ORTHOPAEDIC SURGERY

## 2020-11-17 PROCEDURE — 2580000003 HC RX 258: Performed by: ANESTHESIOLOGY

## 2020-11-17 PROCEDURE — 6360000002 HC RX W HCPCS

## 2020-11-17 PROCEDURE — 3700000001 HC ADD 15 MINUTES (ANESTHESIA): Performed by: ORTHOPAEDIC SURGERY

## 2020-11-17 PROCEDURE — 29880 ARTHRS KNE SRG MNISECTMY M&L: CPT | Performed by: ORTHOPAEDIC SURGERY

## 2020-11-17 PROCEDURE — 7100000011 HC PHASE II RECOVERY - ADDTL 15 MIN: Performed by: ORTHOPAEDIC SURGERY

## 2020-11-17 PROCEDURE — 6360000002 HC RX W HCPCS: Performed by: ORTHOPAEDIC SURGERY

## 2020-11-17 PROCEDURE — 7100000001 HC PACU RECOVERY - ADDTL 15 MIN: Performed by: ORTHOPAEDIC SURGERY

## 2020-11-17 RX ORDER — LIDOCAINE HYDROCHLORIDE 10 MG/ML
INJECTION, SOLUTION EPIDURAL; INFILTRATION; INTRACAUDAL; PERINEURAL PRN
Status: DISCONTINUED | OUTPATIENT
Start: 2020-11-17 | End: 2020-11-17 | Stop reason: SDUPTHER

## 2020-11-17 RX ORDER — SODIUM CHLORIDE 0.9 % (FLUSH) 0.9 %
10 SYRINGE (ML) INJECTION EVERY 12 HOURS SCHEDULED
Status: DISCONTINUED | OUTPATIENT
Start: 2020-11-17 | End: 2020-11-17 | Stop reason: HOSPADM

## 2020-11-17 RX ORDER — HYDROCODONE BITARTRATE AND ACETAMINOPHEN 5; 325 MG/1; MG/1
1 TABLET ORAL
Status: DISCONTINUED | OUTPATIENT
Start: 2020-11-17 | End: 2020-11-17 | Stop reason: HOSPADM

## 2020-11-17 RX ORDER — SODIUM CHLORIDE 0.9 % (FLUSH) 0.9 %
10 SYRINGE (ML) INJECTION PRN
Status: DISCONTINUED | OUTPATIENT
Start: 2020-11-17 | End: 2020-11-17 | Stop reason: HOSPADM

## 2020-11-17 RX ORDER — KETOROLAC TROMETHAMINE 30 MG/ML
INJECTION, SOLUTION INTRAMUSCULAR; INTRAVENOUS PRN
Status: DISCONTINUED | OUTPATIENT
Start: 2020-11-17 | End: 2020-11-17 | Stop reason: SDUPTHER

## 2020-11-17 RX ORDER — MIDAZOLAM HYDROCHLORIDE 1 MG/ML
INJECTION INTRAMUSCULAR; INTRAVENOUS PRN
Status: DISCONTINUED | OUTPATIENT
Start: 2020-11-17 | End: 2020-11-17 | Stop reason: SDUPTHER

## 2020-11-17 RX ORDER — FENTANYL CITRATE 50 UG/ML
INJECTION, SOLUTION INTRAMUSCULAR; INTRAVENOUS PRN
Status: DISCONTINUED | OUTPATIENT
Start: 2020-11-17 | End: 2020-11-17 | Stop reason: SDUPTHER

## 2020-11-17 RX ORDER — DIPHENHYDRAMINE HYDROCHLORIDE 50 MG/ML
12.5 INJECTION INTRAMUSCULAR; INTRAVENOUS
Status: DISCONTINUED | OUTPATIENT
Start: 2020-11-17 | End: 2020-11-17 | Stop reason: HOSPADM

## 2020-11-17 RX ORDER — ONDANSETRON 2 MG/ML
INJECTION INTRAMUSCULAR; INTRAVENOUS PRN
Status: DISCONTINUED | OUTPATIENT
Start: 2020-11-17 | End: 2020-11-17 | Stop reason: SDUPTHER

## 2020-11-17 RX ORDER — LABETALOL HYDROCHLORIDE 5 MG/ML
5 INJECTION, SOLUTION INTRAVENOUS EVERY 10 MIN PRN
Status: DISCONTINUED | OUTPATIENT
Start: 2020-11-17 | End: 2020-11-17 | Stop reason: HOSPADM

## 2020-11-17 RX ORDER — SODIUM CHLORIDE, SODIUM LACTATE, POTASSIUM CHLORIDE, CALCIUM CHLORIDE 600; 310; 30; 20 MG/100ML; MG/100ML; MG/100ML; MG/100ML
INJECTION, SOLUTION INTRAVENOUS CONTINUOUS
Status: DISCONTINUED | OUTPATIENT
Start: 2020-11-17 | End: 2020-11-17 | Stop reason: HOSPADM

## 2020-11-17 RX ORDER — 0.9 % SODIUM CHLORIDE 0.9 %
500 INTRAVENOUS SOLUTION INTRAVENOUS
Status: DISCONTINUED | OUTPATIENT
Start: 2020-11-17 | End: 2020-11-17 | Stop reason: HOSPADM

## 2020-11-17 RX ORDER — PROMETHAZINE HYDROCHLORIDE 25 MG/ML
6.25 INJECTION, SOLUTION INTRAMUSCULAR; INTRAVENOUS
Status: DISCONTINUED | OUTPATIENT
Start: 2020-11-17 | End: 2020-11-17 | Stop reason: HOSPADM

## 2020-11-17 RX ORDER — LIDOCAINE HYDROCHLORIDE 10 MG/ML
1 INJECTION, SOLUTION EPIDURAL; INFILTRATION; INTRACAUDAL; PERINEURAL
Status: DISCONTINUED | OUTPATIENT
Start: 2020-11-17 | End: 2020-11-17 | Stop reason: HOSPADM

## 2020-11-17 RX ORDER — DEXAMETHASONE SODIUM PHOSPHATE 4 MG/ML
INJECTION, SOLUTION INTRA-ARTICULAR; INTRALESIONAL; INTRAMUSCULAR; INTRAVENOUS; SOFT TISSUE PRN
Status: DISCONTINUED | OUTPATIENT
Start: 2020-11-17 | End: 2020-11-17 | Stop reason: SDUPTHER

## 2020-11-17 RX ORDER — PROPOFOL 10 MG/ML
INJECTION, EMULSION INTRAVENOUS PRN
Status: DISCONTINUED | OUTPATIENT
Start: 2020-11-17 | End: 2020-11-17 | Stop reason: SDUPTHER

## 2020-11-17 RX ORDER — HYDROCODONE BITARTRATE AND ACETAMINOPHEN 5; 325 MG/1; MG/1
1 TABLET ORAL EVERY 4 HOURS PRN
Qty: 30 TABLET | Refills: 0 | Status: SHIPPED | OUTPATIENT
Start: 2020-11-17 | End: 2020-11-22

## 2020-11-17 RX ORDER — METOCLOPRAMIDE HYDROCHLORIDE 5 MG/ML
10 INJECTION INTRAMUSCULAR; INTRAVENOUS
Status: DISCONTINUED | OUTPATIENT
Start: 2020-11-17 | End: 2020-11-17 | Stop reason: HOSPADM

## 2020-11-17 RX ORDER — HYDRALAZINE HYDROCHLORIDE 20 MG/ML
5 INJECTION INTRAMUSCULAR; INTRAVENOUS EVERY 10 MIN PRN
Status: DISCONTINUED | OUTPATIENT
Start: 2020-11-17 | End: 2020-11-17 | Stop reason: HOSPADM

## 2020-11-17 RX ORDER — ROPIVACAINE HYDROCHLORIDE 5 MG/ML
INJECTION, SOLUTION EPIDURAL; INFILTRATION; PERINEURAL PRN
Status: DISCONTINUED | OUTPATIENT
Start: 2020-11-17 | End: 2020-11-17 | Stop reason: ALTCHOICE

## 2020-11-17 RX ORDER — FENTANYL CITRATE 50 UG/ML
25 INJECTION, SOLUTION INTRAMUSCULAR; INTRAVENOUS EVERY 5 MIN PRN
Status: DISCONTINUED | OUTPATIENT
Start: 2020-11-17 | End: 2020-11-17 | Stop reason: HOSPADM

## 2020-11-17 RX ADMIN — ONDANSETRON 4 MG: 2 INJECTION INTRAMUSCULAR; INTRAVENOUS at 12:29

## 2020-11-17 RX ADMIN — FENTANYL CITRATE 50 MCG: 50 INJECTION, SOLUTION INTRAMUSCULAR; INTRAVENOUS at 12:08

## 2020-11-17 RX ADMIN — FENTANYL CITRATE 50 MCG: 50 INJECTION, SOLUTION INTRAMUSCULAR; INTRAVENOUS at 12:11

## 2020-11-17 RX ADMIN — KETOROLAC TROMETHAMINE 15 MG: 30 INJECTION, SOLUTION INTRAMUSCULAR; INTRAVENOUS at 12:29

## 2020-11-17 RX ADMIN — FENTANYL CITRATE 50 MCG: 50 INJECTION, SOLUTION INTRAMUSCULAR; INTRAVENOUS at 12:15

## 2020-11-17 RX ADMIN — PROPOFOL 170 MG: 10 INJECTION, EMULSION INTRAVENOUS at 12:03

## 2020-11-17 RX ADMIN — SODIUM CHLORIDE, POTASSIUM CHLORIDE, SODIUM LACTATE AND CALCIUM CHLORIDE: 600; 310; 30; 20 INJECTION, SOLUTION INTRAVENOUS at 10:29

## 2020-11-17 RX ADMIN — LIDOCAINE HYDROCHLORIDE 50 MG: 10 INJECTION, SOLUTION EPIDURAL; INFILTRATION; INTRACAUDAL; PERINEURAL at 12:03

## 2020-11-17 RX ADMIN — MIDAZOLAM 2 MG: 1 INJECTION INTRAMUSCULAR; INTRAVENOUS at 12:03

## 2020-11-17 RX ADMIN — SODIUM CHLORIDE, POTASSIUM CHLORIDE, SODIUM LACTATE AND CALCIUM CHLORIDE: 600; 310; 30; 20 INJECTION, SOLUTION INTRAVENOUS at 12:20

## 2020-11-17 RX ADMIN — DEXAMETHASONE SODIUM PHOSPHATE 4 MG: 4 INJECTION, SOLUTION INTRA-ARTICULAR; INTRALESIONAL; INTRAMUSCULAR; INTRAVENOUS; SOFT TISSUE at 12:22

## 2020-11-17 RX ADMIN — FENTANYL CITRATE 100 MCG: 50 INJECTION, SOLUTION INTRAMUSCULAR; INTRAVENOUS at 12:19

## 2020-11-17 RX ADMIN — PROPOFOL 30 MG: 10 INJECTION, EMULSION INTRAVENOUS at 12:04

## 2020-11-17 RX ADMIN — FENTANYL CITRATE 50 MCG: 50 INJECTION, SOLUTION INTRAMUSCULAR; INTRAVENOUS at 12:04

## 2020-11-17 ASSESSMENT — PULMONARY FUNCTION TESTS
PIF_VALUE: 6
PIF_VALUE: 18
PIF_VALUE: 16
PIF_VALUE: 0
PIF_VALUE: 13
PIF_VALUE: 13
PIF_VALUE: 1
PIF_VALUE: 13
PIF_VALUE: 4
PIF_VALUE: 16
PIF_VALUE: 16
PIF_VALUE: 3
PIF_VALUE: 14
PIF_VALUE: 12
PIF_VALUE: 13
PIF_VALUE: 0
PIF_VALUE: 12
PIF_VALUE: 2
PIF_VALUE: 9
PIF_VALUE: 12
PIF_VALUE: 5
PIF_VALUE: 4
PIF_VALUE: 12
PIF_VALUE: 1
PIF_VALUE: 3
PIF_VALUE: 1
PIF_VALUE: 12
PIF_VALUE: 0
PIF_VALUE: 8
PIF_VALUE: 12
PIF_VALUE: 1
PIF_VALUE: 3
PIF_VALUE: 14
PIF_VALUE: 5
PIF_VALUE: 16
PIF_VALUE: 13
PIF_VALUE: 12
PIF_VALUE: 4
PIF_VALUE: 16
PIF_VALUE: 4
PIF_VALUE: 7
PIF_VALUE: 4

## 2020-11-17 ASSESSMENT — PAIN DESCRIPTION - PAIN TYPE
TYPE: SURGICAL PAIN
TYPE: SURGICAL PAIN

## 2020-11-17 ASSESSMENT — PAIN DESCRIPTION - LOCATION
LOCATION: KNEE
LOCATION: KNEE

## 2020-11-17 ASSESSMENT — PAIN DESCRIPTION - ORIENTATION
ORIENTATION: LEFT
ORIENTATION: LEFT

## 2020-11-17 ASSESSMENT — PAIN SCALES - GENERAL
PAINLEVEL_OUTOF10: 4
PAINLEVEL_OUTOF10: 0

## 2020-11-17 ASSESSMENT — PAIN DESCRIPTION - DESCRIPTORS
DESCRIPTORS: DISCOMFORT
DESCRIPTORS: ACHING

## 2020-11-17 ASSESSMENT — PAIN - FUNCTIONAL ASSESSMENT: PAIN_FUNCTIONAL_ASSESSMENT: 0-10

## 2020-11-17 NOTE — ANESTHESIA POSTPROCEDURE EVALUATION
POST- ANESTHESIA EVALUATION       Pt Name: Prudence Soto  MRN: [id number]  [place]: 1955  Date of evaluation: 11/17/2020  Time:  2:58 PM      /81   Pulse 70   Temp 96.1 °F (35.6 °C) (Temporal)   Resp 16   Ht 5' 1\" (1.549 m)   Wt 207 lb (93.9 kg)   LMP 07/01/2010 (Approximate)   SpO2 98%   BMI 39.11 kg/m²      Consciousness Level  Awake  Cardiopulmonary Status  Stable  Pain Adequately Treated YES  Nausea / Vomiting  NO  Adequate Hydration  YES  Anesthesia Related Complications NONE      Electronically signed by Dot Frias MD on 11/17/2020 at 2:58 PM       Department of Anesthesiology  Postprocedure Note    Patient: Prudence Soto  MRN: [id number]  YOB: 1955  Date of evaluation: 11/17/2020  Time:  2:57 PM     Procedure Summary     Date:  11/17/20 Room / Location:  [address] [place] [address] Magdalena Lara / [facility]: [facility]    Anesthesia Start:  5086 Anesthesia Stop:  2893    Procedure:  ARTHROSCOPY KNEE FOR PARTIAL MEDIAL MENISCECTOMY & PARTIAL LATERAL MENISCECTOMY (Left Knee) Diagnosis:  (LEFT MEDIAL & LATERAL MENISCUS TEAR)    Surgeon:  Maria Luisa Mosley MD Responsible Provider:  Claudene Grey, MD    Anesthesia Type:  general ASA Status:  3          Anesthesia Type: general    Sue Phase I: Sue Score: 10    Sue Phase II: Sue Score: 10    Last vitals: Reviewed and per EMR flowsheets.        Anesthesia Post Evaluation

## 2020-11-17 NOTE — ANESTHESIA PRE PROCEDURE
Department of Anesthesiology  Preprocedure Note       Name:  Timmy Hutson   Age:  72 y.o.  :  1955                                          MRN:  [id number]         Date:  2020      Surgeon: Shweta Abdullahi):  Khris Gentile MD    Procedure: Procedure(s):  Left knee arthroscopy, partial medial & lateral meniscectomy    Medications prior to admission:   Prior to Admission medications    Medication Sig Start Date End Date Taking?  Authorizing Provider   CONTRAVE 8-90 MG per extended release tablet TAKE 1 TABLET BY MOUTH ONCE DAILY FOR 7 DAYS THEN 1 TWICE DAILY FOR 7 DAYS THEN 2 IN THE MORNING AND 1 IN THE EVENING FOR 7 DAYS THEN 2 IN T 9/10/20   Historical Provider, MD   alendronate (FOSAMAX) 70 MG tablet Take 1 tablet by mouth every 7 days 20  Michelle Willingham DO   meloxicam (MOBIC) 15 MG tablet Take 15 mg by mouth daily    Historical Provider, MD   Calcium Citrate-Vitamin D (CALCIUM CITRATE + D PO) Take by mouth daily    Historical Provider, MD   MAGNESIUM CHLORIDE PO Take by mouth daily    Historical Provider, MD   Thiamine HCl (VITAMIN B-1) 250 MG tablet Take 250 mg by mouth daily    Historical Provider, MD   bisoprolol-hydrochlorothiazide [facility]) 5-6.25 MG per tablet Take 1 tablet by mouth daily  16   Historical Provider, MD   Potassium 99 MG TABS Take 99 mg by mouth daily     Historical Provider, MD   Biotin 5000 MCG CAPS Take 5,000 mcg by mouth daily     Historical Provider, MD   Ascorbic Acid (VITAMIN C) 1000 MG tablet Take 1,000 mg by mouth daily    Historical Provider, MD   Cholecalciferol (VITAMIN D3) 2000 units CAPS Take by mouth daily     Historical Provider, MD   docusate sodium (COLACE) 100 MG capsule Take 1 capsule by mouth daily as needed for Constipation 9/17/15   Geovaniteressa Yadav DO   SYMBICORT 160-4.5 MCG/ACT AERO Inhale 2 puffs into the lungs 2 times daily  11/15/14   Historical Provider, MD   SYNTHROID 50 MCG tablet Take 50 mcg by mouth Daily  14   Historical Provider, MD   omeprazole (PRILOSEC) 20 MG capsule Take 20 mg by mouth 2 times daily  10/19/14   Historical Provider, MD   Multiple Vitamins-Minerals (OCUVITE PO) Take by mouth daily     Historical Provider, MD   FLUoxetine (PROZAC) 20 MG capsule Take 20 mg by mouth daily. Historical Provider, MD   losartan (COZAAR) 50 MG tablet Take 50 mg by mouth 2 times daily. Historical Provider, MD   hydrOXYzine (ATARAX) 25 MG tablet Take 25 mg by mouth daily. Historical Provider, MD       Current medications:    No current facility-administered medications for this visit. No current outpatient medications on file. Facility-Administered Medications Ordered in Other Visits   Medication Dose Route Frequency Provider Last Rate Last Dose    lactated ringers infusion   Intravenous Continuous Ringsted MD [place]        sodium chloride flush 0.9 % injection 10 mL  10 mL Intravenous 2 times per day Alyssia Harrison MD        sodium chloride flush 0.9 % injection 10 mL  10 mL Intravenous PRN Ringsted MD [place]        lidocaine PF 1 % injection 1 mL  1 mL Intradermal Once PRN Alyssia Harrison MD           Allergies:     Allergies   Allergen Reactions    Nickel      In foods    Oxycodone-Acetaminophen     Percocet [Oxycodone-Acetaminophen]        Problem List:    Patient Active Problem List   Diagnosis Code    HTN (hypertension) I10    Sleep apnea G47.30    GERD (gastroesophageal reflux disease) K21.9    Vision abnormalities H53.9    Hypothyroid E03.9    Depression F32.9    Osteoporosis ( Mother) M81.0    Osteopenia M85.80    Asthma J45.909    S/P partial gastrectomy Z90.3    S/P abdominoplasty Z98.890    Rectocele 3 N81.6    Vaginal enterocele 1 N81.5    NORMAN (stress urinary incontinence, female) N39.3    Open abdominal wall wound S31.109A    Chronic pain of right knee M25.561, G89.29    Prediabetes R73.03    Abdominal wall ulcer, with fat layer exposed ([facility] [address] L98.492    BMI 37.0-37.9, adult H58.14  Right carpal tunnel syndrome G56.01       Past Medical History:        Diagnosis Date    Asthma     BMI 37.0-37.9, adult 2018    Depression     GERD (gastroesophageal reflux disease)     Glaucoma associated with anomalies of iris     had surgery, no eye drops    High blood triglycerides 2018    191 on     Hypertension     Hypothyroidism     Left elbow fracture     no surgery    Obesity     Open abdominal wall wound 10/7/2015    Osteopenia     Tendonitis     Unspecified sleep apnea     CPAP    Urinary incontinence     Vision abnormalities     glasses    Vitamin B1 deficiency     Vitamin D deficiency     Wears glasses        Past Surgical History:        Procedure Laterality Date    ABDOMINOPLASTY      ABSCESS DRAINAGE  9-16-15    abd. wall abscess    BUNIONECTOMY Bilateral 2003    CARPAL TUNNEL RELEASE Right 2020    OPEN CARPAL TUNNEL RELEASE performed by Ron Davey MD at [address] [place] [address] Bilateral     CHOLECYSTECTOMY      COLONOSCOPY      negative, repeat in 10 years    ENDOSCOPY, COLON, DIAGNOSTIC      EYE SURGERY      see below other history    GLAUCOMA SURGERY  2007    KNEE ARTHROSCOPY Right 2009    torn meniscus    [place] EXPLORE WOUND,ABDOMEN/FLANK/BACK Left 2018    WOUND EXPLORATION W/WOUND VAC PLACEMENT / I&D performed by Breanne Looney DO at [place] [address] Right 2018    SHOULDER ARTHROSCOPY ROTATOR CUFF REPAIR performed by Ron Davey MD at [address] [place] [address] Right 1998    x 2    STOMACH SURGERY      stomach stapling, [facility]    TONSILLECTOMY      3 y.o.    WISDOM TOOTH EXTRACTION         Social History:    Social History     Tobacco Use    Smoking status: Former Smoker     Last attempt to quit: 2013     Years since quittin.4    Smokeless tobacco: Never Used   Substance Use Topics    Alcohol use:  Yes     Alcohol/week: 4.0 standard drinks Types: 4 Standard drinks or equivalent per week     Comment: occasionally weekends                                Counseling given: Not Answered      Vital Signs (Current): There were no vitals filed for this visit. BP Readings from Last 3 Encounters:   11/17/20 117/84   11/05/20 139/84   09/28/20 130/78       NPO Status:                                                                                 BMI:   Wt Readings from Last 3 Encounters:   11/17/20 207 lb (93.9 kg)   11/05/20 207 lb (93.9 kg)   09/28/20 214 lb (97.1 kg)     There is no height or weight on file to calculate BMI.    CBC:   Lab Results   Component Value Date    WBC 8.4 11/05/2020    RBC 4.49 11/05/2020    HGB 13.4 11/05/2020    HCT 40.2 11/05/2020    MCV 89.4 11/05/2020    RDW 16.3 11/05/2020     11/05/2020       CMP:   Lab Results   Component Value Date     11/05/2020    K 4.1 11/05/2020     11/05/2020    CO2 27 11/05/2020    BUN 26 11/05/2020    CREATININE 0.73 11/05/2020    GFRAA >60 11/05/2020    LABGLOM >60 11/05/2020    GLUCOSE 102 11/05/2020    PROT 7.2 10/03/2020    CALCIUM 9.6 11/05/2020    BILITOT 0.26 10/03/2020    ALKPHOS 73 10/03/2020    AST 20 10/03/2020    ALT 20 10/03/2020       POC Tests: No results for input(s): POCGLU, POCNA, POCK, POCCL, POCBUN, POCHEMO, POCHCT in the last 72 hours.     Coags:   Lab Results   Component Value Date    PROTIME 11.3 09/16/2015    INR 1.1 09/16/2015    APTT 39.1 06/27/2012       HCG (If Applicable): No results found for: PREGTESTUR, PREGSERUM, HCG, HCGQUANT     ABGs: No results found for: PHART, PO2ART, IAV9MSN, QRP3TYM, BEART, L9UBKPOM     Type & Screen (If Applicable):  No results found for: LABABO, LABRH    Drug/Infectious Status (If Applicable):  No results found for: HIV, HEPCAB    COVID-19 Screening (If Applicable):   Lab Results   Component Value Date    COVID19 Not Detected 11/13/2020    COVID19 Not Detected 05/18/2020 Anesthesia Evaluation  Patient summary reviewed and Nursing notes reviewed no history of anesthetic complications:   Airway: Mallampati: III  TM distance: <3 FB   Neck ROM: full  Mouth opening: > = 3 FB Dental: normal exam   (+) caps      Pulmonary:normal exam  breath sounds clear to auscultation  (+) sleep apnea:  asthma:                            Cardiovascular:    (+) hypertension:,       ECG reviewed  Rhythm: regular  Rate: normal                    Neuro/Psych:   (+) neuromuscular disease:, psychiatric history:depression/anxiety             GI/Hepatic/Renal:   (+) GERD:, morbid obesity          Endo/Other:    (+) hypothyroidism::., .                 Abdominal:           Vascular:                                          Anesthesia Plan      general     ASA 3       Induction: intravenous. MIPS: Postoperative opioids intended and Prophylactic antiemetics administered. Anesthetic plan and risks discussed with patient. Plan discussed with CRNA. The patient was counseled at length about the risks of jorgito Covid-19 during their perioperative period and any recovery window from their procedure. The patient was made aware that jorgito Covid-19  may worsen their prognosis for recovering from their procedure  and lend to a higher morbidity and/or mortality risk. All material risks, benefits, and reasonable alternatives including postponing the procedure were discussed. The patient does wish to proceed with the procedure at this time.         Namita Castaneda MD   11/17/2020

## 2020-11-17 NOTE — INTERVAL H&P NOTE
Update History & Physical    The patient's History and Physical of November 5, 2020 was reviewed with the patient and I examined the patient. There was no change. The surgical site was confirmed by the patient and me. Patient has been NPO since midnight. No blood thinners in the past 7 days. Patient took her PPI and BP med with sip of water. Plan: The risks, benefits, expected outcome, and alternative to the recommended procedure have been discussed with the patient. Patient understands and wants to proceed with the procedure.      Electronically signed by KERRIE Chatterjee CNP on 11/17/2020 at 9:49 AM

## 2020-11-17 NOTE — OP NOTE
Operative Note      Patient: Km Molina  YOB: 1955  MRN: [id number]    Date of Procedure: 11/17/2020    Pre-Op Diagnosis: LEFT MEDIAL & LATERAL MENISCUS TEAR left knee    Post-Op Diagnosis: Same       Procedure(s):  ARTHROSCOPY KNEE FOR PARTIAL MEDIAL MENISCECTOMY & PARTIAL LATERAL MENISCECTOMY    Surgeon(s):  Teofilo Lucia MD    Assistant:   Resident: Joy Lopez DO    Anesthesia: General    Estimated Blood Loss (mL): Minimal    Complications: None    Specimens:   * No specimens in log *    Implants:  * No implants in log *      Drains: * No LDAs found *    Findings: Medial and lateral meniscus tears left knee with 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 year old female who presents with a history of pain, locking, and giving away sensations of their left knee. Physical examination was positive for Rick's examination. MRI was consistent with medial and lateral meniscus tears. Having failed conservative treatment, it was advised that arthroscopic examination and treatment of their knee would be beneficial and consent was obtained with risk and benefits explained to the patient. The patient was taken tot he operative room and general anesthesia was administered. A tourniquet was placed to the operatives lower extremity and then placed in the leg williamson. The leg was exsanguinated and the tourniquet inflated to the 300mmHg. The leg was the prepped and draped in the usual sterile fashion. Time-out was called to verify laterality. Medial and lateral portals were made and the scope placed in the lateral portal. The patella femoral joint was examined and noted to be relatively unremarkable. . The scope was then passes down the medial gutter into the medial compartment.  A probe was used to assess the medial meniscus and a tear was identified in the posterior horn medial to the root  of the medial meniscus. A partial medial menisectomy was carried ou with basket forceps and then smoothed out with a shaver. Repeat probing of the meniscus found it to be stable. There was an area of grade III chondromalacia on the medial femoral condyle with loose chondral elements that were debrided with a shaver to a stable rim. The arthroscope was then passed into the notch of the knee. The ACL was found not to have any significant damage or laxity. The scope was then passed in the lateral compartment. A probe could be inserted into a radial tear of the posterior horn of the lateral meniscus just lateral to the root. Partial lateral meniscectomy was carried out with a shaver down to a stable rim and reprobing found to be no further unstable elements. There was no articular damage. The scope was then passed into the suprapatellar area and the shaver was used to remove any further soft tissue debris. The scope was removed and the knee evacuated of fluid and injected with 20cc of 0.5% ropivacaine. The sterile bulky dressing was applied and the leg then wrapped with a large 6-inch ace bandage from toes to the mid-thigh. The tourniquet was then deflated at less than 30 minutes. The patient was awaken and taken to the PACU in good condition.      Electronically signed by Marisela Laureano MD on 11/17/2020 at 12:30 PM

## 2020-11-30 ENCOUNTER — OFFICE VISIT (OUTPATIENT)
Dept: ORTHOPEDIC SURGERY | Age: 65
End: 2020-11-30

## 2020-11-30 PROCEDURE — 99024 POSTOP FOLLOW-UP VISIT: CPT | Performed by: ORTHOPAEDIC SURGERY

## 2020-11-30 NOTE — PROGRESS NOTES
Patient returns today status post left knee arthroscopy with partial (medial and lateral) meniscectomy. Patient has no major complaints other than expected tightness/swelling with ROM. Sharp/stabbing pain has improved. On exam, portal sites are without redness or drainage. No calf tenderness; negative Jim's sign. Motion is 0-100 degrees. No significant effusion. Assessment  Status post left knee arthroscopy with partial medial and partial lateral meniscectomies    Plan  Patient given exercises to perform. Patient given activities/ motions to complete. Continue activities at home. Return to work. RTO PRN. Call with any future problems.

## 2020-12-01 NOTE — PROGRESS NOTES
ORTHOPEDIC PATIENT EVALUATION      HPI / Chief Complaint  Andrea Ortega is a 59 y.o. right hand dominant female who presents for evaluation of right hand and wrist pain associated with numbness and tingling. She indicates that this is been going on for about 20 years now. She apparently has been diagnosed with carpal tunnel syndrome and she has been treating it conservatively with nighttime splinting. Over the course of the past year her symptoms of gotten worse and the brace is no longer working for her. At this time she has intermittent numbness and tingling in her hand specifically involving her 4 fingers associated with weakness but also pain dorsal radially at the level of the MTP joints. She indicates that she has no symptoms at night unless her splint is too tight. Of note she did have a massive rotator cuff tear of her right shoulder and I repaired this close to 2 years ago now. She indicates that she is doing relatively well. She occasionally has some achiness in the shoulder and inability to perform certain activities but this is not always consistently the case. Past Medical History  Leonor Orosco  has a past medical history of Asthma, BMI 37.0-37.9, adult, Depression, GERD (gastroesophageal reflux disease), Glaucoma associated with anomalies of iris, High blood triglycerides, Hypertension, Hypothyroidism, Left elbow fracture, Obesity, Open abdominal wall wound, Osteopenia, Tendonitis, Unspecified sleep apnea, Urinary incontinence, Vision abnormalities, Vitamin B1 deficiency, and Vitamin D deficiency. Past Surgical History  Leonor Orosco  has a past surgical history that includes Cataract removal (Bilateral, 2007); Glaucoma surgery (2007); Cholecystectomy (2003); Bunionectomy (Bilateral, 2003); Rotator cuff repair (Right, 1998); Abdominoplasty (1995); Stomach surgery (1989); [place] tooth extraction; Tonsillectomy; Abscess Drainage (9-16-15); Colonoscopy (2012);  Endoscopy, colon, diagnostic; eye Pap shows: RARE ATYPICAL SQUAMOUS CELLS OF UNDETERMINED SIGNIFICANCE                    : HPV High Risk      Result:  Positive  Referal has been placed for possible colposcopy. nonoperative and operative intervention. Again she is been dealing with this for over 20 years now worse over the course of the past year. Consequently at this time she certainly would be a candidate for surgical intervention by way of an open carpal tunnel release. She would like to go this route. We discussed in detail what surgery would entail in terms of the procedure, risks and benefits of surgery, postoperative recovery course, and anticipated outcome. Risks as discussed included but were not limited to risk of infection, wound healing complications, stiffness, persistent pain and paresthesias, temporary and/or permanent nerve injury, and risk of anesthesia. She demonstrated a good understanding of our discussion and would like to proceed with surgery. We will get her scheduled for surgery at her convenience and facilitate her getting appropriate preoperative medical clearance. .  All questions were appropriately answered.

## 2020-12-04 ENCOUNTER — HOSPITAL ENCOUNTER (OUTPATIENT)
Dept: ULTRASOUND IMAGING | Age: 65
Discharge: HOME | End: 2020-12-06
Payer: COMMERCIAL

## 2020-12-04 PROCEDURE — 76536 US EXAM OF HEAD AND NECK: CPT

## 2020-12-18 ENCOUNTER — TELEPHONE (OUTPATIENT)
Dept: OBGYN CLINIC | Age: 65
End: 2020-12-18

## 2020-12-18 ENCOUNTER — HOSPITAL ENCOUNTER (OUTPATIENT)
Dept: WOMENS IMAGING | Age: 65
Discharge: HOME | End: 2020-12-20
Payer: COMMERCIAL

## 2020-12-18 PROCEDURE — 77080 DXA BONE DENSITY AXIAL: CPT

## 2020-12-22 ENCOUNTER — TELEPHONE (OUTPATIENT)
Dept: OBGYN CLINIC | Age: 65
End: 2020-12-22

## 2020-12-22 NOTE — TELEPHONE ENCOUNTER
----- Message from KERRIE Landers CNP sent at 12/18/2020 10:33 AM EST -----  Osteopenia noted  Please let patient know and offer treatment recommendations.

## 2020-12-22 NOTE — TELEPHONE ENCOUNTER
----- Message from KERRIE Carvajal CNP sent at 12/18/2020 10:33 AM EST -----  Osteopenia noted  Please let patient know and offer treatment recommendations.

## 2020-12-22 NOTE — TELEPHONE ENCOUNTER
Per CNP, patient advised of dexa scan results showing osteopenia. Patient voiced an understanding, currently taking calcium, vitamin d, and fosamax. Patient does admit to a less active lifestyle with the pandemic, patient encouraged in increase weight bearing activity as tolerated. Patient advised to call the office with questions/concerns.

## 2021-04-09 ENCOUNTER — HOSPITAL ENCOUNTER (OUTPATIENT)
Age: 66
Setting detail: SPECIMEN
Discharge: HOME | End: 2021-04-09
Payer: COMMERCIAL

## 2021-04-09 LAB
ABSOLUTE EOS #: 0.3 K/UL (ref 0–0.4)
ABSOLUTE IMMATURE GRANULOCYTE: ABNORMAL K/UL (ref 0–0.3)
ABSOLUTE LYMPH #: 1.9 K/UL (ref 1–4.8)
ABSOLUTE MONO #: 0.5 K/UL (ref 0.1–1.3)
BASOPHILS # BLD: 1 % (ref 0–2)
BASOPHILS ABSOLUTE: 0 K/UL (ref 0–0.2)
COLLAGEN ADENOSINE-5'-DIPHOSPHATE (ADP) TIME: 75 SEC (ref 67–112)
COLLAGEN EPINEPHRINE TIME: 132 SEC (ref 85–172)
DIFFERENTIAL TYPE: ABNORMAL
EOSINOPHILS RELATIVE PERCENT: 6 % (ref 0–4)
HCT VFR BLD CALC: 37.7 % (ref 36–46)
HEMOGLOBIN: 12.2 G/DL (ref 12–16)
IMMATURE GRANULOCYTES: ABNORMAL %
INR BLD: 0.9
LYMPHOCYTES # BLD: 31 % (ref 24–44)
MCH RBC QN AUTO: 28.4 PG (ref 26–34)
MCHC RBC AUTO-ENTMCNC: 32.2 G/DL (ref 31–37)
MCV RBC AUTO: 88.3 FL (ref 80–100)
MONOCYTES # BLD: 7 % (ref 1–7)
NRBC AUTOMATED: ABNORMAL PER 100 WBC
PARTIAL THROMBOPLASTIN TIME: 31.4 SEC (ref 24–36)
PDW BLD-RTO: 14.6 % (ref 11.5–14.9)
PLATELET # BLD: 255 K/UL (ref 150–450)
PLATELET ESTIMATE: ABNORMAL
PLATELET FUNCTION INTERP: NORMAL
PMV BLD AUTO: 7 FL (ref 6–12)
PROTHROMBIN TIME: 12.6 SEC (ref 11.8–14.6)
RBC # BLD: 4.28 M/UL (ref 4–5.2)
RBC # BLD: ABNORMAL 10*6/UL
SEG NEUTROPHILS: 55 % (ref 36–66)
SEGMENTED NEUTROPHILS ABSOLUTE COUNT: 3.4 K/UL (ref 1.3–9.1)
WBC # BLD: 6.2 K/UL (ref 3.5–11)
WBC # BLD: ABNORMAL 10*3/UL

## 2021-04-09 PROCEDURE — 85730 THROMBOPLASTIN TIME PARTIAL: CPT

## 2021-04-09 PROCEDURE — 36415 COLL VENOUS BLD VENIPUNCTURE: CPT

## 2021-04-09 PROCEDURE — 85025 COMPLETE CBC W/AUTO DIFF WBC: CPT

## 2021-04-09 PROCEDURE — 85576 BLOOD PLATELET AGGREGATION: CPT

## 2021-04-09 PROCEDURE — 85610 PROTHROMBIN TIME: CPT

## 2021-04-17 LAB
-: NORMAL
REASON FOR REJECTION: NORMAL
ZZ NTE CLEAN UP: ORDERED TEST: NORMAL
ZZ NTE WITH NAME CLEAN UP: SPECIMEN SOURCE: NORMAL

## 2021-12-09 ENCOUNTER — HOSPITAL ENCOUNTER (OUTPATIENT)
Dept: WOMENS IMAGING | Age: 66
Discharge: HOME | End: 2021-12-11
Payer: MEDICARE

## 2021-12-09 DIAGNOSIS — Z12.39 SCREENING BREAST EXAMINATION: ICD-10-CM

## 2021-12-09 PROCEDURE — 77063 BREAST TOMOSYNTHESIS BI: CPT

## 2022-03-18 ENCOUNTER — OFFICE VISIT (OUTPATIENT)
Dept: ORTHOPEDIC SURGERY | Age: 67
End: 2022-03-18
Payer: MEDICARE

## 2022-03-18 ENCOUNTER — HOSPITAL ENCOUNTER (OUTPATIENT)
Age: 67
Discharge: HOME | End: 2022-03-18
Payer: MEDICARE

## 2022-03-18 VITALS — BODY MASS INDEX: 39.08 KG/M2 | WEIGHT: 207 LBS | HEIGHT: 61 IN

## 2022-03-18 DIAGNOSIS — M25.512 LEFT SHOULDER PAIN, UNSPECIFIED CHRONICITY: ICD-10-CM

## 2022-03-18 DIAGNOSIS — M75.82 TENDINITIS OF LEFT ROTATOR CUFF: Primary | ICD-10-CM

## 2022-03-18 DIAGNOSIS — M25.531 RIGHT WRIST PAIN: ICD-10-CM

## 2022-03-18 LAB
ALBUMIN SERPL-MCNC: 4.7 G/DL (ref 3.5–5.2)
ALP BLD-CCNC: 105 U/L (ref 35–104)
ALT SERPL-CCNC: 27 U/L (ref 5–33)
ANION GAP SERPL CALCULATED.3IONS-SCNC: 10 MMOL/L (ref 9–17)
AST SERPL-CCNC: 24 U/L
BILIRUB SERPL-MCNC: 0.18 MG/DL (ref 0.3–1.2)
BILIRUBIN URINE: NEGATIVE
BUN BLDV-MCNC: 27 MG/DL (ref 8–23)
CALCIUM SERPL-MCNC: 10.5 MG/DL (ref 8.6–10.4)
CHLORIDE BLD-SCNC: 105 MMOL/L (ref 98–107)
CHOLESTEROL/HDL RATIO: 4.7
CHOLESTEROL: 251 MG/DL
CO2: 28 MMOL/L (ref 20–31)
COLOR: YELLOW
COMMENT UA: NORMAL
CREAT SERPL-MCNC: 0.75 MG/DL (ref 0.5–0.9)
GFR AFRICAN AMERICAN: >60 ML/MIN
GFR NON-AFRICAN AMERICAN: >60 ML/MIN
GFR SERPL CREATININE-BSD FRML MDRD: ABNORMAL ML/MIN/{1.73_M2}
GLUCOSE BLD-MCNC: 102 MG/DL (ref 70–99)
GLUCOSE URINE: NEGATIVE
HDLC SERPL-MCNC: 53 MG/DL
KETONES, URINE: NEGATIVE
LDL CHOLESTEROL: 145 MG/DL (ref 0–130)
LEUKOCYTE ESTERASE, URINE: NEGATIVE
NITRITE, URINE: NEGATIVE
PH UA: 7 (ref 5–8)
POTASSIUM SERPL-SCNC: 4.6 MMOL/L (ref 3.7–5.3)
PROTEIN UA: NEGATIVE
SODIUM BLD-SCNC: 143 MMOL/L (ref 135–144)
SPECIFIC GRAVITY UA: 1.01 (ref 1–1.03)
THYROXINE, FREE: 1.3 NG/DL (ref 0.93–1.7)
TOTAL PROTEIN: 7.4 G/DL (ref 6.4–8.3)
TRIGL SERPL-MCNC: 266 MG/DL
TSH SERPL DL<=0.05 MIU/L-ACNC: 0.81 MIU/L (ref 0.3–5)
TURBIDITY: CLEAR
URINE HGB: NEGATIVE
UROBILINOGEN, URINE: NORMAL
VITAMIN D 25-HYDROXY: 84 NG/ML

## 2022-03-18 PROCEDURE — G8417 CALC BMI ABV UP PARAM F/U: HCPCS | Performed by: ORTHOPAEDIC SURGERY

## 2022-03-18 PROCEDURE — 84439 ASSAY OF FREE THYROXINE: CPT

## 2022-03-18 PROCEDURE — 80061 LIPID PANEL: CPT

## 2022-03-18 PROCEDURE — 80053 COMPREHEN METABOLIC PANEL: CPT

## 2022-03-18 PROCEDURE — 3017F COLORECTAL CA SCREEN DOC REV: CPT | Performed by: ORTHOPAEDIC SURGERY

## 2022-03-18 PROCEDURE — 1036F TOBACCO NON-USER: CPT | Performed by: ORTHOPAEDIC SURGERY

## 2022-03-18 PROCEDURE — 99213 OFFICE O/P EST LOW 20 MIN: CPT | Performed by: ORTHOPAEDIC SURGERY

## 2022-03-18 PROCEDURE — G8427 DOCREV CUR MEDS BY ELIG CLIN: HCPCS | Performed by: ORTHOPAEDIC SURGERY

## 2022-03-18 PROCEDURE — 1123F ACP DISCUSS/DSCN MKR DOCD: CPT | Performed by: ORTHOPAEDIC SURGERY

## 2022-03-18 PROCEDURE — 81003 URINALYSIS AUTO W/O SCOPE: CPT

## 2022-03-18 PROCEDURE — 82306 VITAMIN D 25 HYDROXY: CPT

## 2022-03-18 PROCEDURE — 36415 COLL VENOUS BLD VENIPUNCTURE: CPT

## 2022-03-18 PROCEDURE — 4040F PNEUMOC VAC/ADMIN/RCVD: CPT | Performed by: ORTHOPAEDIC SURGERY

## 2022-03-18 PROCEDURE — G8484 FLU IMMUNIZE NO ADMIN: HCPCS | Performed by: ORTHOPAEDIC SURGERY

## 2022-03-18 PROCEDURE — 84443 ASSAY THYROID STIM HORMONE: CPT

## 2022-03-18 PROCEDURE — G8399 PT W/DXA RESULTS DOCUMENT: HCPCS | Performed by: ORTHOPAEDIC SURGERY

## 2022-03-18 PROCEDURE — 1090F PRES/ABSN URINE INCON ASSESS: CPT | Performed by: ORTHOPAEDIC SURGERY

## 2022-03-18 RX ORDER — DICLOFENAC SODIUM 75 MG/1
75 TABLET, DELAYED RELEASE ORAL 2 TIMES DAILY WITH MEALS
Qty: 28 TABLET | Refills: 0 | Status: SHIPPED | OUTPATIENT
Start: 2022-03-18 | End: 2022-04-22

## 2022-03-18 NOTE — PROGRESS NOTES
ORTHOPEDIC PATIENT EVALUATION      HPI / Chief Complaint  Yaya Griffin is a 77 y.o. female who presents for evaluation of her left shoulder and right wrist.  She is well-known to me as she has had a previous right carpal tunnel release back on 5/20/2020. She indicates that she was doing well up until about 6 months ago when she developed what she describes as a burning pain along the volar aspect of her wrist.  It is intermittent in nature but when present she has a difficult time using the hand for her daily activities. She denies having any associated numbness or tingling. She also has noticed over the past several months pain over the lateral aspect of her shoulder. She states that she does have full range of motion but cannot sleep on the side due to pain and does bother her randomly during the course of her day. Past Medical History  Shabbir Avila  has a past medical history of Asthma, BMI 37.0-37.9, adult, Depression, GERD (gastroesophageal reflux disease), Glaucoma associated with anomalies of iris, High blood triglycerides, Hypertension, Hypothyroidism, Left elbow fracture, Obesity, Open abdominal wall wound, Osteopenia, Tendonitis, Unspecified sleep apnea, Urinary incontinence, Vision abnormalities, Vitamin B1 deficiency, Vitamin D deficiency, and Wears glasses. Past Surgical History  Shabbir Avila  has a past surgical history that includes Cataract removal (Bilateral, 2007); Glaucoma surgery (2007); Cholecystectomy (2003); Bunionectomy (Bilateral, 2003); Rotator cuff repair (Right, 1998); Abdominoplasty (1995); Stomach surgery (1989); [place] tooth extraction; Tonsillectomy; Abscess Drainage (9-16-15); Colonoscopy (2012); Endoscopy, colon, diagnostic; eye surgery; Knee arthroscopy (Right, 2009); pr shldr arthroscop,surg,w/rotat cuff repr (Right, 6/11/2018); pr explore wound,abdomen/flank/back (Left, 11/5/2018); Carpal tunnel release (Right, 5/20/2020); and Knee arthroscopy (Left, 11/17/2020).     Current Medications  Current Outpatient Medications   Medication Sig Dispense Refill    diclofenac (VOLTAREN) 75 MG EC tablet Take 1 tablet by mouth 2 times daily (with meals) 28 tablet 0    CONTRAVE 8-90 MG per extended release tablet TAKE 1 TABLET BY MOUTH ONCE DAILY FOR 7 DAYS THEN 1 TWICE DAILY FOR 7 DAYS THEN 2 IN THE MORNING AND 1 IN THE EVENING FOR 7 DAYS THEN 2 IN T      alendronate (FOSAMAX) 70 MG tablet Take 1 tablet by mouth every 7 days 12 tablet 3    meloxicam (MOBIC) 15 MG tablet Take 15 mg by mouth daily      Calcium Citrate-Vitamin D (CALCIUM CITRATE + D PO) Take by mouth daily      MAGNESIUM CHLORIDE PO Take by mouth daily      Thiamine HCl (VITAMIN B-1) 250 MG tablet Take 250 mg by mouth daily      bisoprolol-hydrochlorothiazide (ZIAC) 5-6.25 MG per tablet Take 1 tablet by mouth daily       Potassium 99 MG TABS Take 99 mg by mouth daily       Biotin 5000 MCG CAPS Take 5,000 mcg by mouth daily       Ascorbic Acid (VITAMIN C) 1000 MG tablet Take 1,000 mg by mouth daily      Cholecalciferol (VITAMIN D3) 2000 units CAPS Take by mouth daily       docusate sodium (COLACE) 100 MG capsule Take 1 capsule by mouth daily as needed for Constipation 20 capsule 0    SYMBICORT 160-4.5 MCG/ACT AERO Inhale 2 puffs into the lungs 2 times daily       SYNTHROID 50 MCG tablet Take 50 mcg by mouth Daily       omeprazole (PRILOSEC) 20 MG capsule Take 20 mg by mouth 2 times daily       Multiple Vitamins-Minerals (OCUVITE PO) Take by mouth daily       FLUoxetine (PROZAC) 20 MG capsule Take 20 mg by mouth daily.  losartan (COZAAR) 50 MG tablet Take 50 mg by mouth 2 times daily.  hydrOXYzine (ATARAX) 25 MG tablet Take 25 mg by mouth daily. No current facility-administered medications for this visit. Allergies  Allergies have been reviewed. Josef Smith is allergic to nickel, oxycodone-acetaminophen, and percocet [oxycodone-acetaminophen].     Social History  Josef Smith  reports that she quit smoking about 8 years ago. She has never used smokeless tobacco. She reports current alcohol use of about 4.0 standard drinks of alcohol per week. She reports that she does not use drugs. Family History  Madison's family history includes Arthritis in her father; COPD in her mother; Cancer in her father; Diabetes in her maternal grandmother; Glaucoma in her father; High Blood Pressure in her mother; Osteoarthritis in her mother; Osteoporosis in her mother; Other in her mother; Rheum Arthritis in her mother. Review of Systems   History obtained from the patient. REVIEW OF SYSTEMS:   Constitution: negative for fever, chills, weight loss or malaise   Musculoskeletal: As noted in the HPI   Neurologic: As noted in the HPI    Physical Exam  Ht 5' 1\" (1.549 m)   Wt 207 lb (93.9 kg)   LMP 07/01/2010 (Approximate)   BMI 39.11 kg/m²    General Appearance: alert, well appearing, and in no distress  Mental Status: alert, oriented to person, place, and time  Evaluation of the patient's right hand and upper extremity demonstrates intact skin without any warmth, erythema or swelling. Sensation is grossly intact light touch in all dermatomes and she can actively flex, extend, abduct and abduct all of her fingers. She is tender to palpation at the base of the first [facility] joint and along the radial aspect of her wrist at the level of the carpal bones. She has a negative Tinel's at the carpal tunnel. Evaluation of her left shoulder and upper extremity demonstrates intact skin without warmth, erythema, or notable swelling. She has 135 degrees of active shoulder elevation, 150 degrees of abduction, 45 degrees of external rotation internal rotation to L1 which is fairly symmetric to the contralateral side. She has 5/5 muscle strength with resisted deltoid, supraspinatus, external rotation and internal rotation testing.   She is tender to palpation at the level of the anterolateral corner of the left shoulder and has a positive Neer's and Carranza impingement sign. Imaging Studies  4 x-ray views of the left shoulder completed on 3/18/2022 were reviewed independently demonstrating normal glenohumeral joint space. Mild osteophytic change involving the distal clavicle and acromion at the acromioclavicular joint. No obvious fracture, dislocation or subluxation. 3 x-ray views of the right wrist completed on 3/18/2022 were reviewed independently demonstrating joint space loss and irregularity involving the first [facility] joint in addition to the STT joints. No obvious dislocation or subluxation noted at this time. Assessment and Plan  Zarina Ruvalcaba is a 77 y.o. old female with right wrist pain due to intercarpal osteoarthritis as demonstrated on her x-rays above. I did educate the patient about this and discussed treatment options. We will proceed at this time with symptomatic management. With regards to her left shoulder she does have some findings consistent with rotator cuff tendinitis. I recommended proceeding conservatively with some therapy as well as a short course of a prescription anti-inflammatory which should help with her hand as well. She was amenable and a prescription for physical therapy was provided and it 2-week course of Voltaren was electronically sent to her pharmacy. I will see her back for reevaluation as needed but she may return or call at anytime with persistent or worsening symptoms or with any questions or concerns. This note is created with the assistance of a speech recognition program.  While intending to generate adocument that actually reflects the content of the visit, the document can still have some errors including those of syntax and sound a like substitutions which may escape proof reading. It such instances, actual meaningcan be extrapolated by contextual diversion.

## 2022-04-19 DIAGNOSIS — M75.82 TENDINITIS OF LEFT ROTATOR CUFF: ICD-10-CM

## 2022-04-19 NOTE — TELEPHONE ENCOUNTER
Pharmacy requested voltaren refill for patient Lt RTC tendinitis & Rt wrist pain. Filled during last office visit on 3/18/22.

## 2022-04-22 RX ORDER — DICLOFENAC SODIUM 75 MG/1
TABLET, DELAYED RELEASE ORAL
Qty: 28 TABLET | Refills: 0 | Status: SHIPPED | OUTPATIENT
Start: 2022-04-22

## 2022-12-09 VITALS — BODY MASS INDEX: 40.97 KG/M2 | WEIGHT: 217 LBS | HEIGHT: 61 IN

## 2022-12-09 NOTE — LETTER
12/9/2022        [address] [place] [address] [address] [address]    Dear Marita Kent:    Your healthcare provider has ordered a low dose CT scan of the chest for lung cancer screening. Enclosed you will find information about CT lung screening and smoking cessation resources. If you are unable to keep you appointment for you CT lung screening, please call our scheduling department at [phone]. Keep in mind that CT lung screening does not take the place of smoking cessation. Please do not hesitate to contact me if you have any questions or concerns.     [address] [place] [address],      [facility]  [phone]

## 2022-12-29 ENCOUNTER — HOSPITAL ENCOUNTER (OUTPATIENT)
Dept: CT IMAGING | Age: 67
Discharge: HOME | End: 2022-12-31
Payer: MEDICARE

## 2022-12-29 DIAGNOSIS — F17.211 CIGARETTE NICOTINE DEPENDENCE IN REMISSION: ICD-10-CM

## 2022-12-29 DIAGNOSIS — Z87.891 PERSONAL HISTORY OF TOBACCO USE: ICD-10-CM

## 2022-12-29 PROCEDURE — 71271 CT THORAX LUNG CANCER SCR C-: CPT

## 2023-01-27 ENCOUNTER — HOSPITAL ENCOUNTER (OUTPATIENT)
Dept: WOMENS IMAGING | Age: 68
End: 2023-01-27
Payer: MEDICARE

## 2023-01-27 DIAGNOSIS — Z12.31 SCREENING MAMMOGRAM FOR BREAST CANCER: ICD-10-CM

## 2023-01-27 DIAGNOSIS — Z87.891 FORMER SMOKER: ICD-10-CM

## 2023-01-27 DIAGNOSIS — Z78.0 POSTMENOPAUSAL STATUS: ICD-10-CM

## 2023-01-27 PROCEDURE — 77080 DXA BONE DENSITY AXIAL: CPT

## 2023-01-27 PROCEDURE — 77063 BREAST TOMOSYNTHESIS BI: CPT

## 2023-02-07 ENCOUNTER — OFFICE VISIT (OUTPATIENT)
Dept: PODIATRY | Age: 68
End: 2023-02-07
Payer: MEDICARE

## 2023-02-07 VITALS — WEIGHT: 212 LBS | BODY MASS INDEX: 40.02 KG/M2 | HEIGHT: 61 IN

## 2023-02-07 DIAGNOSIS — M79.672 BILATERAL FOOT PAIN: Primary | ICD-10-CM

## 2023-02-07 DIAGNOSIS — G57.51 TARSAL TUNNEL SYNDROME OF RIGHT SIDE: ICD-10-CM

## 2023-02-07 DIAGNOSIS — M79.671 BILATERAL FOOT PAIN: Primary | ICD-10-CM

## 2023-02-07 PROCEDURE — 99204 OFFICE O/P NEW MOD 45 MIN: CPT | Performed by: PODIATRIST

## 2023-02-07 PROCEDURE — 1123F ACP DISCUSS/DSCN MKR DOCD: CPT | Performed by: PODIATRIST

## 2023-02-07 PROCEDURE — G8484 FLU IMMUNIZE NO ADMIN: HCPCS | Performed by: PODIATRIST

## 2023-02-07 PROCEDURE — 1090F PRES/ABSN URINE INCON ASSESS: CPT | Performed by: PODIATRIST

## 2023-02-07 PROCEDURE — 1036F TOBACCO NON-USER: CPT | Performed by: PODIATRIST

## 2023-02-07 PROCEDURE — 3017F COLORECTAL CA SCREEN DOC REV: CPT | Performed by: PODIATRIST

## 2023-02-07 PROCEDURE — G8417 CALC BMI ABV UP PARAM F/U: HCPCS | Performed by: PODIATRIST

## 2023-02-07 PROCEDURE — G8427 DOCREV CUR MEDS BY ELIG CLIN: HCPCS | Performed by: PODIATRIST

## 2023-02-07 PROCEDURE — G8399 PT W/DXA RESULTS DOCUMENT: HCPCS | Performed by: PODIATRIST

## 2023-02-07 NOTE — PROGRESS NOTES
[address] PODIATRY [address] [place] [address]  Dept: [phone]  Dept Fax: [phone]    NEW PATIENT PROGRESS NOTE  Date of patient's visit: 2/7/2023  Patient's Name:  Adam Saini YOB: 1955            Patient Care Team:  Priscilla Rodriguez DO as PCP - [address] [place] [address] as Consulting Physician (Obstetrics & Gynecology)  Melissa Scott DPM as Physician (Podiatry)        Chief Complaint   Patient presents with    Foot Pain     Bilateral foot, hx tarsal tunnel syndrome - onset 1+ years    New Patient    X-ray      Bilateral foot          HPI:   Adam Saini is a 79 y.o. female who presents to the office today complaining of bilateral foot pain and discomfort. Symptoms began 1 year(s) ago. Patient relates pain is Present. Pain is rated 2 out of 10 and is described as constant, mild. Treatments prior to today's visit include: previous podiatry treatment. Currently denies F/C/N/V. Pt's primary care physician is Priscilla Rodriguez DO last seen 11/2/22. Allergies   Allergen Reactions    Nickel      In foods  Other reaction(s): Unknown    Oxycodone-Acetaminophen      Other reaction(s): Unknown    Percocet [Oxycodone-Acetaminophen]        Past Medical History:   Diagnosis Date    Asthma     BMI 37.0-37.9, adult 11/30/2018    Depression     GERD (gastroesophageal reflux disease)     Glaucoma associated with anomalies of iris     had surgery, no eye drops    High blood triglycerides 02/21/2018    191 on     Hypertension     Hypothyroidism     Left elbow fracture     no surgery    Obesity     Open abdominal wall wound 10/7/2015    Osteopenia     Tendonitis     Unspecified sleep apnea     CPAP    Urinary incontinence     Vision abnormalities     glasses    Vitamin B1 deficiency     Vitamin D deficiency     Wears glasses        Prior to Admission medications    Medication Sig Start Date End Date Taking? Authorizing Provider   diclofenac (VOLTAREN) 75 MG EC tablet TAKE 1 TABLET BY MOUTH TWO TIMES DAILY WITH MEALS -GENERIC FOR VOLTAREN 4/22/22  Yes Renuka Romo MD   meloxicam (MOBIC) 15 MG tablet Take 15 mg by mouth daily   Yes Historical Provider, MD   Calcium Citrate-Vitamin D (CALCIUM CITRATE + D PO) Take by mouth daily   Yes Historical Provider, MD   MAGNESIUM CHLORIDE PO Take by mouth daily   Yes Historical Provider, MD   Thiamine HCl (VITAMIN B-1) 250 MG tablet Take 250 mg by mouth daily   Yes Historical Provider, MD   bisoprolol-hydrochlorothiazide [facility]) 5-6.25 MG per tablet Take 1 tablet by mouth daily  11/22/16  Yes Historical Provider, MD   Potassium 99 MG TABS Take 99 mg by mouth daily    Yes Historical Provider, MD   Biotin 5000 MCG CAPS Take 5,000 mcg by mouth daily    Yes Historical Provider, MD   Ascorbic Acid (VITAMIN C) 1000 MG tablet Take 1,000 mg by mouth daily   Yes Historical Provider, MD   Cholecalciferol (VITAMIN D3) 2000 units CAPS Take by mouth daily    Yes Historical Provider, MD   docusate sodium (COLACE) 100 MG capsule Take 1 capsule by mouth daily as needed for Constipation 9/17/15  Yes Buel Mustache, DO   SYMBICORT 160-4.5 MCG/ACT AERO Inhale 2 puffs into the lungs 2 times daily  11/15/14  Yes Historical Provider, MD   SYNTHROID 50 MCG tablet Take 50 mcg by mouth Daily  12/9/14  Yes Historical Provider, MD   omeprazole (PRILOSEC) 20 MG capsule Take 20 mg by mouth 2 times daily  10/19/14  Yes Historical Provider, MD   Multiple Vitamins-Minerals (OCUVITE PO) Take by mouth daily    Yes Historical Provider, MD   FLUoxetine (PROZAC) 20 MG capsule Take 20 mg by mouth daily. Yes Historical Provider, MD   losartan (COZAAR) 50 MG tablet Take 50 mg by mouth 2 times daily. Yes Historical Provider, MD   hydrOXYzine (ATARAX) 25 MG tablet Take 25 mg by mouth daily.    Yes Historical Provider, MD   CONTRAVE 8-90 MG per extended release tablet TAKE 1 TABLET BY MOUTH ONCE DAILY FOR 7 DAYS THEN 1 TWICE DAILY FOR 7 DAYS THEN 2 IN THE MORNING AND 1 IN THE EVENING FOR 7 DAYS THEN 2 IN T 9/10/20   Historical Provider, MD   alendronate (FOSAMAX) 70 MG tablet Take 1 tablet by mouth every 7 days 20  Rajendra Bee DO       Past Surgical History:   Procedure Laterality Date    ABDOMINOPLASTY      ABSCESS DRAINAGE  9-16-15    abd. wall abscess    BUNIONECTOMY Bilateral     CARPAL TUNNEL RELEASE Right 2020    OPEN CARPAL TUNNEL RELEASE performed by Lizzette Allen MD at [facility]. Bilateral 2007    CHOLECYSTECTOMY      COLONOSCOPY  2012    negative, repeat in 10 years    ENDOSCOPY, COLON, DIAGNOSTIC      EYE SURGERY      see below other history    GLAUCOMA SURGERY  2007    KNEE ARTHROSCOPY Right 2009    torn meniscus    KNEE ARTHROSCOPY Left 2020    ARTHROSCOPY KNEE FOR PARTIAL MEDIAL MENISCECTOMY & PARTIAL LATERAL MENISCECTOMY performed by Matt Bal MD at [facility] ABDOMEN/FLANK/BACK Left 2018    WOUND EXPLORATION W/WOUND [place] Level / I&D performed by Jassi Sesay DO at [address] ARTHROSCOPY SHOULDER W/ROTATOR CUFF RPR Right 2018    SHOULDER ARTHROSCOPY ROTATOR CUFF REPAIR performed by Lizzette Allen MD at [address] Right 1998    x 2    STOMACH SURGERY      stomach stapling, [facility]    TONSILLECTOMY      Mari [email or web address]    WISDOM TOOTH EXTRACTION         Family History   Problem Relation Age of Onset    Diabetes Maternal Grandmother     COPD Mother     Rheum Arthritis Mother     Osteoarthritis Mother     Osteoporosis Mother     Other Mother         AAA    High Blood Pressure Mother     Glaucoma Father     Arthritis Father     Cancer Father         throat cancer twice        Social History     Tobacco Use    Smoking status: Former     Packs/day: 1.00     Years: 40.00     Pack years: 40.00     Types: Cigarettes     Quit date: 2022     Years since quittin.0    Smokeless tobacco: Never   Substance Use Topics    Alcohol use: Yes     Alcohol/week: 4.0 standard drinks     Types: 4 Standard drinks or equivalent per week     Comment: occasionally weekends       Review of Systems    Review of Systems:   History obtained from chart review and the patient  General ROS: negative for - chills, fatigue, fever, night sweats or weight gain  Constitutional: Negative for chills, diaphoresis, fatigue, fever and unexpected weight change. Musculoskeletal: Positive for arthralgias, gait problem and joint swelling. Neurological ROS: negative for - behavioral changes, confusion, headaches or seizures. Negative for weakness and numbness. Dermatological ROS: negative for - mole changes, rash  Cardiovascular: Negative for leg swelling. Gastrointestinal: Negative for constipation, diarrhea, nausea and vomiting. Lower Extremity Physical Examination:   Vitals: There were no vitals filed for this visit. General: AAO x 3 in NAD. Dermatologic Exam:  Skin lesion/ulceration Absent . Skin No rashes or nodules noted. .       Musculoskeletal:     1st MPJ ROM decreased, Bilateral.  Muscle strength 5/5, Bilateral.  Pain present upon palpation of right ankle at the medial aspecty of the ankle joint and along the couse of the post tibial tendon and tibal nerve . Medial longitudinal arch, Bilateral WNL.   Ankle ROM WNL,Bilateral.    Dorsally contracted digits absent digits 1-5 Bilateral.     Vascular: DP and PT pulses palpable 2/4, Bilateral.  CFT <3 seconds, Bilateral.  Hair growth present to the level of the digits, Bilateral.  Edema absent, Bilateral.  Varicosities absent, Bilateral. Erythema absent, Bilateral    Neurological: Sensation intact to light touch to level of digits, Bilateral.  Protective sensation intact 10/10 sites via 5.07/10g Townsend-Daphney Monofilament, Bilateral.  negative Tinel's, Bilateral.  negative Valleix sign, Bilateral.      Integument: Warm, dry, supple, Bilateral. Open lesion absent, Bilateral.  Interdigital maceration absent to web spaces 1-4, Bilateral.  Nails are normal in length, thickness and color 1-5 bilateral.  Fissures absent, Bilateral.     Radiographs:  3 views right  foot: no frature or dislcoation;    3 views left foot:  no fracture or dislocation. No space occupying object   Asessment: Patient is a 79 y.o. female with:    Diagnosis Orders   1. Bilateral foot pain  XR FOOT RIGHT (MIN 3 VIEWS)    XR FOOT LEFT (MIN 3 VIEWS)      2. Tarsal tunnel syndrome of right side  XR FOOT RIGHT (MIN 3 VIEWS)    MRI ANKLE RIGHT WO CONTRAST            Plan: Patient examined and evaluated. Current condition and treatment options discussed in detail. Discussed conservative and surgical options with the patient. Advised pt to her ocniditon and the likelyhood of tarsal tunnel there is no bony abnormality but I m worried about soft tissue vs varocisties that are hurting th enerve.  since we have tried NSAID, immobilization,  RICE therapy physical therapy and the symptoms have not improved we will need to order an MRI of the affected limb to assess the area    . Verbal and written instructions given to patient. Contact office with any questions/problems/concerns. RTC in 1week(s) after MRI for possible surgical planning    All labs were reviewed and all imagining including the above findings were reviewed PRIOR to the patients arrival and with the patient today. Previous patient encounter was reviewed. Encounters from the patients other medical providers were reviewed and noted. Time was spent educating the patient and their families/caregivers on proper care of the feet and ankles. All the above diagnosis were addressed at todays visit and all questions were answered. A total of 45 minutes was spent with this patients encounter which included charting after the patients visit  .     2/7/2023    Electronically signed by Arianne Larsen DPM on 2/7/2023 at 2:15 PM  2/7/2023

## 2023-02-07 NOTE — PATIENT INSTRUCTIONS
Schedule a Vaccine  When you qualify to receive the vaccine, call the [facility]) COVID-19 Vaccination Hotline to schedule your appointment or to get additional information about the [facility]) locations which are offering the COVID-19 vaccine. To be 94% effective, it's important that you receive two doses of one of the COVID-19 vaccines. -If you are receiving the [employer] vaccine, your second shot will be scheduled as close to 21 days after the first shot as possible. -If you are receiving the Moderna vaccine, your second shot will be scheduled as close to 28 days after the first shot as possible. [facility]) COVID-19 Vaccination Hotline: [phone]    Links to [facility]) website and [facility] website:    [employer]/[facility]-[email or web address]-[email or web address]-[email or web address]/[email or web address]    [email or web address]

## 2023-02-27 ENCOUNTER — HOSPITAL ENCOUNTER (OUTPATIENT)
Dept: MRI IMAGING | Age: 68
Discharge: HOME | End: 2023-03-01
Payer: MEDICARE

## 2023-02-27 DIAGNOSIS — G57.51 TARSAL TUNNEL SYNDROME OF RIGHT SIDE: ICD-10-CM

## 2023-02-27 PROCEDURE — 73721 MRI JNT OF LWR EXTRE W/O DYE: CPT

## 2023-02-28 ENCOUNTER — OFFICE VISIT (OUTPATIENT)
Dept: PODIATRY | Age: 68
End: 2023-02-28
Payer: MEDICARE

## 2023-02-28 VITALS — BODY MASS INDEX: 40.02 KG/M2 | HEIGHT: 61 IN | WEIGHT: 212 LBS

## 2023-02-28 DIAGNOSIS — G89.29 CHRONIC PAIN OF RIGHT ANKLE: ICD-10-CM

## 2023-02-28 DIAGNOSIS — G57.51 TARSAL TUNNEL SYNDROME OF RIGHT SIDE: Primary | ICD-10-CM

## 2023-02-28 DIAGNOSIS — M25.571 CHRONIC PAIN OF RIGHT ANKLE: ICD-10-CM

## 2023-02-28 PROCEDURE — G8484 FLU IMMUNIZE NO ADMIN: HCPCS | Performed by: PODIATRIST

## 2023-02-28 PROCEDURE — 3017F COLORECTAL CA SCREEN DOC REV: CPT | Performed by: PODIATRIST

## 2023-02-28 PROCEDURE — G8427 DOCREV CUR MEDS BY ELIG CLIN: HCPCS | Performed by: PODIATRIST

## 2023-02-28 PROCEDURE — 1090F PRES/ABSN URINE INCON ASSESS: CPT | Performed by: PODIATRIST

## 2023-02-28 PROCEDURE — G8417 CALC BMI ABV UP PARAM F/U: HCPCS | Performed by: PODIATRIST

## 2023-02-28 PROCEDURE — 1123F ACP DISCUSS/DSCN MKR DOCD: CPT | Performed by: PODIATRIST

## 2023-02-28 PROCEDURE — G8399 PT W/DXA RESULTS DOCUMENT: HCPCS | Performed by: PODIATRIST

## 2023-02-28 PROCEDURE — 99214 OFFICE O/P EST MOD 30 MIN: CPT | Performed by: PODIATRIST

## 2023-02-28 PROCEDURE — 1036F TOBACCO NON-USER: CPT | Performed by: PODIATRIST

## 2023-02-28 RX ORDER — PREDNISONE 10 MG/1
TABLET ORAL
Qty: 20 TABLET | Refills: 0 | Status: SHIPPED | OUTPATIENT
Start: 2023-02-28

## 2023-02-28 NOTE — PATIENT INSTRUCTIONS
The medication list included in this document is our record of what you are currently taking, including any changes that were made at today's visit.  If you find any differences when compared to your medications at home, or have any questions that were not answered at your visit, please contact the office.

## 2023-02-28 NOTE — PROGRESS NOTES
[address] PODIATRY [facility]  [facility] [address] [place] [address]  Dept: [phone]  Dept Fax: [phone]    RETURN PATIENT PROGRESS NOTE  Date of patient's visit: 2/28/2023  Patient's Name:  Beatrice Redman YOB: 1955            Patient Care Team:  Bijal Garcia DO as PCP - [address] [place], [place] [address]DO as Consulting Physician (Obstetrics & Gynecology)  Daniel Reyes DPM as Physician (Podiatry)       Beatrice Redman 79 y.o. female that presents for follow-up of   Chief Complaint   Patient presents with    Foot Pain     Right foot MRI review. Completed 02/27/2023 at [facility]     Pt's primary care physician is Bijal Garcia DO last seen 11/02/2022  Symptoms began 1 year(s) ago and are unchanged . Patient relates pain is Present. Pain is rated 3 out of 10 and is described as constant, mild, moderate. Treatments prior to today's visit include: MRI x rays and orthotics with . Currently denies F/C/N/V. Allergies   Allergen Reactions    Nickel      In foods  Other reaction(s): Unknown    Oxycodone-Acetaminophen      Other reaction(s): Unknown    Percocet [Oxycodone-Acetaminophen]        Past Medical History:   Diagnosis Date    Asthma     BMI 37.0-37.9, adult 11/30/2018    Depression     GERD (gastroesophageal reflux disease)     Glaucoma associated with anomalies of iris     had surgery, no eye drops    High blood triglycerides 02/21/2018    191 on     Hypertension     Hypothyroidism     Left elbow fracture     no surgery    Obesity     Open abdominal wall wound 10/7/2015    Osteopenia     Tendonitis     Unspecified sleep apnea     CPAP    Urinary incontinence     Vision abnormalities     glasses    Vitamin B1 deficiency     Vitamin D deficiency     Wears glasses        Prior to Admission medications    Medication Sig Start Date End Date Taking?  Authorizing Provider   diclofenac (VOLTAREN) 75 MG EC tablet TAKE 1 TABLET BY MOUTH TWO TIMES DAILY WITH MEALS -GENERIC FOR VOLTAREN 4/22/22  Yes Laila Romo MD   CONTRAVE 8-90 MG per extended release tablet TAKE 1 TABLET BY MOUTH ONCE DAILY FOR 7 DAYS THEN 1 TWICE DAILY FOR 7 DAYS THEN 2 IN THE MORNING AND 1 IN THE EVENING FOR 7 DAYS THEN 2 IN T 9/10/20  Yes Historical Provider, MD   meloxicam (MOBIC) 15 MG tablet Take 15 mg by mouth daily   Yes Historical Provider, MD   Calcium Citrate-Vitamin D (CALCIUM CITRATE + D PO) Take by mouth daily   Yes Historical Provider, MD   MAGNESIUM CHLORIDE PO Take by mouth daily   Yes Historical Provider, MD   Thiamine HCl (VITAMIN B-1) 250 MG tablet Take 250 mg by mouth daily   Yes Historical Provider, MD   bisoprolol-hydrochlorothiazide [facility]) 5-6.25 MG per tablet Take 1 tablet by mouth daily  11/22/16  Yes Historical Provider, MD   Potassium 99 MG TABS Take 99 mg by mouth daily    Yes Historical Provider, MD   Biotin 5000 MCG CAPS Take 5,000 mcg by mouth daily    Yes Historical Provider, MD   Ascorbic Acid (VITAMIN C) 1000 MG tablet Take 1,000 mg by mouth daily   Yes Historical Provider, MD   Cholecalciferol (VITAMIN D3) 2000 units CAPS Take by mouth daily    Yes Historical Provider, MD   docusate sodium (COLACE) 100 MG capsule Take 1 capsule by mouth daily as needed for Constipation 9/17/15  Yes Charline Macias, DO   SYMBICORT 160-4.5 MCG/ACT AERO Inhale 2 puffs into the lungs 2 times daily  11/15/14  Yes Historical Provider, MD   SYNTHROID 50 MCG tablet Take 50 mcg by mouth Daily  12/9/14  Yes Historical Provider, MD   omeprazole (PRILOSEC) 20 MG capsule Take 20 mg by mouth 2 times daily  10/19/14  Yes Historical Provider, MD   Multiple Vitamins-Minerals (OCUVITE PO) Take by mouth daily    Yes Historical Provider, MD   FLUoxetine (PROZAC) 20 MG capsule Take 20 mg by mouth daily. Yes Historical Provider, MD   losartan (COZAAR) 50 MG tablet Take 50 mg by mouth 2 times daily.    Yes Historical Provider, MD   hydrOXYzine (ATARAX) 25 MG tablet Take 25 mg by mouth daily. Yes Historical Provider, MD   alendronate (FOSAMAX) 70 MG tablet Take 1 tablet by mouth every 7 days 9/28/20 12/27/20  Soco Marrufo DO       Review of Systems    Review of Systems:  History obtained from chart review and the patient  General ROS: negative for - chills, fatigue, fever, night sweats or weight gain  Constitutional: Negative for chills, diaphoresis, fatigue, fever and unexpected weight change. Musculoskeletal: Positive for arthralgias, gait problem and joint swelling. Neurological ROS: negative for - behavioral changes, confusion, headaches or seizures. Negative for weakness and numbness. Dermatological ROS: negative for - mole changes, rash  Cardiovascular: Negative for leg swelling. Gastrointestinal: Negative for constipation, diarrhea, nausea and vomiting. Lower Extremity Physical Examination:     Vitals: There were no vitals filed for this visit. General: AAO x 3 in NAD. Dermatologic Exam:  Skin lesion/ulceration Absent . Skin No rashes or nodules noted. .       Musculoskeletal:     1st MPJ ROM decreased, Bilateral.  Muscle strength 5/5, Bilateral.  Pain present upon palpation of right meidal ankle along the course of the tibial nerve. .  Medial longitudinal arch, Bilateral WNL.   Ankle ROM WNL,Bilateral.    Dorsally contracted digits absent digits 1-5 Bilateral.     Vascular: DP and PT pulses palpable 2/4, Bilateral.  CFT <3 seconds, Bilateral.  Hair growth present to the level of the digits, Bilateral.  Edema absent, Bilateral.  Varicosities absent, Bilateral. Erythema absent, Bilateral    Neurological: Sensation intact to light touch to level of digits, Bilateral.  Protective sensation intact 10/10 sites via 5.07/10g [place] Monofilament, Bilateral.  negative Tinel's, Bilateral.  negative Valleix sign, Bilateral.      Integument: Warm, dry, supple, Bilateral.  Open lesion absent, Bilateral.  Interdigital maceration absent to web spaces 1-4, Bilateral.  Nails are normal in length, thickness and color 1-5 bilateral.  Fissures absent, Bilateral.     Right ankle MRI    1. An obstructing lesion is not identified within the tarsal tunnel. 2. Mild tibialis posterior tenosynovitis and insertional tendinopathy. Evidence of prior superomedial portion spring ligament tear. 3. Mild peroneal tenosynovitis. 4. Features of os trigonum syndrome. Asessment: Patient is a 79 y.o. female with:    Diagnosis Orders   1. Tarsal tunnel syndrome of right side  predniSONE (DELTASONE) 10 MG tablet      2. Chronic pain of right ankle  predniSONE (DELTASONE) 10 MG tablet            Plan: Patient examined and evaluated. Current condition and treatment options discussed in detail. Advised pt to her conditon. I had a long discussion today with the patient about the likely diagnosis and its natural history, physical exam and imaging findings, as well as treatment options. We discussed both surgical and nonsurgical treatments, including risks and benefits. From a nonoperative standpoint, we discussed rest/activity modification, NSAIDs/Acetaminophen/topical anesthetics, orthotics, bracing/immobilization, and physical therapy. Surgically, I recommend we continue with conservative options. Will try a tapered steroid for the inflammation   Pt to continue to wears shoes at all times      . Verbal and written instructions given to patient. Contact office with any questions/problems/concerns. No orders of the defined types were placed in this encounter. No orders of the defined types were placed in this encounter. Discussed steroid shot vs oral pill. Pt has had 2 shots there in the past and would like to wait until it gets bad     All labs were reviewed and all imagining including the above findings were reviewed PRIOR to the patients arrival and with the patient today. Previous patient encounter was reviewed.   Encounters from the patients other medical providers were reviewed and noted. I personally interpreted the imaging and labs and discussed the results with the patient Time was spent educating the patient and their families/caregivers on proper care of the feet and ankles. All the above diagnosis were addressed at todays visit and all questions were answered. A total of 30 minutes was spent with this patients encounter which included charting after the patients visit     RTC in 4week(s).     2/28/2023      Electronically signed by Arianne Larsen DPM on 2/28/2023 at 1:09 PM  2/28/2023

## 2023-11-29 ENCOUNTER — TELEPHONE (OUTPATIENT)
Dept: ONCOLOGY | Age: 68
End: 2023-11-29

## (undated) DEVICE — SINGLE PORT MANIFOLD: Brand: NEPTUNE 2

## (undated) DEVICE — GLOVE ORANGE PI 7 1/2   MSG9075

## (undated) DEVICE — SUTURE NONABSORBABLE MONOFILAMENT 2-0 FS 18 IN ETHILON 664H

## (undated) DEVICE — SOLUTION IV IRRIG POUR BRL 0.9% SODIUM CHL 2F7124

## (undated) DEVICE — DRAPE,U/ SHT,SPLIT,PLAS,STERIL: Brand: MEDLINE

## (undated) DEVICE — SUTURE FIBERWIRE SZ 2 L38IN NONABSORBABLE BLU L36.6MM 1/2 AR7202

## (undated) DEVICE — GAUZE,SPONGE,FLUFF,6"X6.75",STRL,5/TRAY: Brand: MEDLINE

## (undated) DEVICE — ST CHARLES MINOR ABDOMINAL PK: Brand: MEDLINE INDUSTRIES, INC.

## (undated) DEVICE — CANNULA ARTHSCP L3CM ID8MM DBL DAM 1 PC MOLD LO PROF FLNG

## (undated) DEVICE — KIT POS ULT SHLDR PT CARE REHAB SLNG

## (undated) DEVICE — BNDG,ELSTC,MATRIX,STRL,2"X5YD,LF,HOOK&LP: Brand: MEDLINE

## (undated) DEVICE — COVER,MAYO STAND,STERILE: Brand: MEDLINE

## (undated) DEVICE — SUTURE PROL SZ 3-0 L18IN NONABSORBABLE BLU L30MM FS-1 3/8 8663G

## (undated) DEVICE — SYRINGE MED 50ML LUERLOCK TIP

## (undated) DEVICE — Z INACTIVE USE 2653177 SPONGE GZ W2XL2IN NONWOVEN 4 PLY FASTER WICKING ABIL AVANT

## (undated) DEVICE — DRESSING,GAUZE,XEROFORM,CURAD,1"X8",ST: Brand: CURAD

## (undated) DEVICE — HYPODERMIC SAFETY NEEDLE: Brand: MAGELLAN

## (undated) DEVICE — PACK ARTHRO W PCH

## (undated) DEVICE — DRAPE,REIN 53X77,STERILE: Brand: MEDLINE

## (undated) DEVICE — COUNTER NDL 10 COUNT HLD 20 FOAM BLK SGL MAG

## (undated) DEVICE — INSTRUMENT POUCH,DOUBLE POCKET: Brand: DEVON

## (undated) DEVICE — CORD,CAUTERY,BIPOLAR,STERILE: Brand: MEDLINE

## (undated) DEVICE — SOLUTION IV IRRIG LACTATED RINGERS 3000ML 2B7487

## (undated) DEVICE — GOWN,AURORA,NONREINFORCED,LARGE: Brand: MEDLINE

## (undated) DEVICE — TUBING, SUCTION, 3/16" X 10', STRAIGHT: Brand: MEDLINE

## (undated) DEVICE — DRESSING TRNSPAR W5XL4.5IN FLM SHT SEMIPERMEABLE WIND

## (undated) DEVICE — MERCY HEALTH ST CHARLES: Brand: MEDLINE INDUSTRIES, INC.

## (undated) DEVICE — DRESSING NEG PRSS M BLK POLYUR FOAM WND HYDROPHOBIC VAC

## (undated) DEVICE — SOLUTION SCRB 4OZ 4% CHG H2O AIDED FOR PREOPERATIVE SKIN

## (undated) DEVICE — Device

## (undated) DEVICE — 1010 S-DRAPE TOWEL DRAPE 10/BX: Brand: STERI-DRAPE™

## (undated) DEVICE — BANDAGE,SELF ADHRNT,COFLEX,4"X5YD,STRL: Brand: COLABEL

## (undated) DEVICE — GLOVE ORANGE PI 7   MSG9070

## (undated) DEVICE — DUP USE 218950 PROBE SERFAS ENERGY 90S

## (undated) DEVICE — 3M™ IOBAN™ 2 ANTIMICROBIAL INCISE DRAPE 6651EZ: Brand: IOBAN™ 2

## (undated) DEVICE — BANDAGE,ELASTIC,ESMARK,STERILE,6"X9',LF: Brand: MEDLINE

## (undated) DEVICE — SOLUTION IV IRRIG WATER 1000ML POUR BRL 2F7114

## (undated) DEVICE — Z DISCONTINUED BY MEDLINE USE 2711682 TRAY SKIN PREP DRY W/ PREM GLV

## (undated) DEVICE — ZIMMER® STERILE DISPOSABLE TOURNIQUET CUFF WITH PLC, DUAL PORT, SINGLE BLADDER, 30 IN. (76 CM)

## (undated) DEVICE — SYRINGE MED 10ML LUERLOCK TIP W/O SFTY DISP

## (undated) DEVICE — GOWN,SIRUS,NONRNF,SETINSLV,XL,20/CS: Brand: MEDLINE

## (undated) DEVICE — 3M™ WARMING BLANKET, LOWER BODY, 10 PER CASE, 42568: Brand: BAIR HUGGER™

## (undated) DEVICE — NEEDLE SPNL 18GA L3.5IN W/ QNCKE SHARPER BVL DURA CLICK

## (undated) DEVICE — GLOVE ORTHO 8   MSG9480

## (undated) DEVICE — GLOVE SURG SZ 65 STD WHT LTX SYN POLYMER BEAD REINF ANTI RL

## (undated) DEVICE — SHEET, T, LAPAROTOMY, STERILE: Brand: MEDLINE

## (undated) DEVICE — IMMOBILIZER SHLDR L10.5-17IN D7IN SLNG W/ 15DEG ABD PLLW

## (undated) DEVICE — CONTAINER,SPECIMEN,4OZ,OR STRL: Brand: MEDLINE

## (undated) DEVICE — [ARTHROSCOPY PUMP,  DO NOT USE IF PACKAGE IS DAMAGED,  KEEP DRY,  KEEP AWAY FROM SUNLIGHT,  PROTECT FROM HEAT AND RADIOACTIVE SOURCES.]: Brand: FLOSTEADY

## (undated) DEVICE — GLOVE SURG SZ 7 L12IN FNGR THK75MIL WHT LTX POLYMER BEAD

## (undated) DEVICE — BLADE SAW RESECTOR CUT 4MM

## (undated) DEVICE — BANDAGE GZ W2XL75IN ST RAYON POLY CNFRM STRTCH LTWT

## (undated) DEVICE — SHEET, ORTHO, SPLIT, STERILE: Brand: MEDLINE

## (undated) DEVICE — SUTURE ETHLN SZ 3-0 L18IN NONABSORBABLE BLK FS-1 L24MM 3/8 663H

## (undated) DEVICE — NEEDLE SUT PASS FOR ROT CUF LABRAL REP MULTFI SCORPION

## (undated) DEVICE — 4-PORT MANIFOLD: Brand: NEPTUNE 2

## (undated) DEVICE — DRESSING TRNSPAR W2XL2.75IN FLM SHT SEMIPERMEABLE WIND

## (undated) DEVICE — PADDING CAST W6INXL4YD POLY POR SPUN DACRON SYN VERSATILE

## (undated) DEVICE — SYRINGE, LUER LOCK, 10ML: Brand: MEDLINE

## (undated) DEVICE — 3M™ STERI-DRAPE™ U-DRAPE 1015: Brand: STERI-DRAPE™

## (undated) DEVICE — [TOMCAT CUTTER, ARTHROSCOPIC SHAVER BLADE,  DO NOT RESTERILIZE,  DO NOT USE IF PACKAGE IS DAMAGED,  KEEP DRY,  KEEP AWAY FROM SUNLIGHT]: Brand: FORMULA

## (undated) DEVICE — SYRINGE IRRIG 60ML SFT PLIABLE BLB EZ TO GRP 1 HND USE W/